# Patient Record
Sex: MALE | Race: BLACK OR AFRICAN AMERICAN | Employment: UNEMPLOYED | URBAN - METROPOLITAN AREA
[De-identification: names, ages, dates, MRNs, and addresses within clinical notes are randomized per-mention and may not be internally consistent; named-entity substitution may affect disease eponyms.]

---

## 2018-01-17 PROBLEM — L89.312 PRESSURE ULCER OF RIGHT BUTTOCK, STAGE 2 (HCC): Status: ACTIVE | Noted: 2018-01-17

## 2018-01-17 PROBLEM — Z87.898 HISTORY OF URINARY URGENCY: Status: ACTIVE | Noted: 2018-01-17

## 2018-06-14 ENCOUNTER — HOSPITAL ENCOUNTER (INPATIENT)
Age: 83
LOS: 1 days | Discharge: NURSING, REHAB OR LONG-TERM CARE | DRG: 683 | End: 2018-06-16
Attending: EMERGENCY MEDICINE | Admitting: INTERNAL MEDICINE
Payer: MEDICARE

## 2018-06-14 ENCOUNTER — APPOINTMENT (OUTPATIENT)
Dept: GENERAL RADIOLOGY | Age: 83
DRG: 683 | End: 2018-06-14
Payer: MEDICARE

## 2018-06-14 DIAGNOSIS — R53.1 GENERALIZED WEAKNESS: Primary | ICD-10-CM

## 2018-06-14 DIAGNOSIS — N28.9 ACUTE RENAL INSUFFICIENCY: ICD-10-CM

## 2018-06-14 DIAGNOSIS — R26.2 AMBULATORY DYSFUNCTION: ICD-10-CM

## 2018-06-14 LAB
ALBUMIN SERPL-MCNC: 3.6 G/DL (ref 3.5–5.2)
ALP BLD-CCNC: 64 U/L (ref 40–129)
ALT SERPL-CCNC: 20 U/L (ref 0–40)
ANION GAP SERPL CALCULATED.3IONS-SCNC: 12 MMOL/L (ref 7–16)
AST SERPL-CCNC: 40 U/L (ref 0–39)
BASOPHILS ABSOLUTE: 0.03 E9/L (ref 0–0.2)
BASOPHILS RELATIVE PERCENT: 0.6 % (ref 0–2)
BILIRUB SERPL-MCNC: 0.6 MG/DL (ref 0–1.2)
BUN BLDV-MCNC: 27 MG/DL (ref 8–23)
CALCIUM SERPL-MCNC: 9.1 MG/DL (ref 8.6–10.2)
CHLORIDE BLD-SCNC: 101 MMOL/L (ref 98–107)
CO2: 28 MMOL/L (ref 22–29)
CREAT SERPL-MCNC: 1.5 MG/DL (ref 0.7–1.2)
EKG ATRIAL RATE: 63 BPM
EKG P AXIS: 49 DEGREES
EKG P-R INTERVAL: 80 MS
EKG Q-T INTERVAL: 422 MS
EKG QRS DURATION: 106 MS
EKG QTC CALCULATION (BAZETT): 431 MS
EKG R AXIS: -58 DEGREES
EKG T AXIS: 39 DEGREES
EKG VENTRICULAR RATE: 63 BPM
EOSINOPHILS ABSOLUTE: 0.08 E9/L (ref 0.05–0.5)
EOSINOPHILS RELATIVE PERCENT: 1.5 % (ref 0–6)
GFR AFRICAN AMERICAN: 54
GFR NON-AFRICAN AMERICAN: 54 ML/MIN/1.73
GLUCOSE BLD-MCNC: 101 MG/DL (ref 74–109)
HCT VFR BLD CALC: 37.3 % (ref 37–54)
HEMOGLOBIN: 12.1 G/DL (ref 12.5–16.5)
IMMATURE GRANULOCYTES #: 0.03 E9/L
IMMATURE GRANULOCYTES %: 0.6 % (ref 0–5)
INR BLD: 1.2
LACTIC ACID: 1.1 MMOL/L (ref 0.5–2.2)
LYMPHOCYTES ABSOLUTE: 0.91 E9/L (ref 1.5–4)
LYMPHOCYTES RELATIVE PERCENT: 17.5 % (ref 20–42)
MCH RBC QN AUTO: 29.6 PG (ref 26–35)
MCHC RBC AUTO-ENTMCNC: 32.4 % (ref 32–34.5)
MCV RBC AUTO: 91.2 FL (ref 80–99.9)
MONOCYTES ABSOLUTE: 0.53 E9/L (ref 0.1–0.95)
MONOCYTES RELATIVE PERCENT: 10.2 % (ref 2–12)
NEUTROPHILS ABSOLUTE: 3.63 E9/L (ref 1.8–7.3)
NEUTROPHILS RELATIVE PERCENT: 69.6 % (ref 43–80)
PDW BLD-RTO: 14.9 FL (ref 11.5–15)
PLATELET # BLD: 146 E9/L (ref 130–450)
PMV BLD AUTO: 11 FL (ref 7–12)
POTASSIUM SERPL-SCNC: 3.7 MMOL/L (ref 3.5–5)
PROTHROMBIN TIME: 14.3 SEC (ref 9.3–12.4)
RBC # BLD: 4.09 E12/L (ref 3.8–5.8)
SODIUM BLD-SCNC: 141 MMOL/L (ref 132–146)
TOTAL PROTEIN: 6.7 G/DL (ref 6.4–8.3)
TROPONIN: 0.01 NG/ML (ref 0–0.03)
WBC # BLD: 5.2 E9/L (ref 4.5–11.5)

## 2018-06-14 PROCEDURE — 83605 ASSAY OF LACTIC ACID: CPT

## 2018-06-14 PROCEDURE — 71045 X-RAY EXAM CHEST 1 VIEW: CPT

## 2018-06-14 PROCEDURE — 84484 ASSAY OF TROPONIN QUANT: CPT

## 2018-06-14 PROCEDURE — 85025 COMPLETE CBC W/AUTO DIFF WBC: CPT

## 2018-06-14 PROCEDURE — 51702 INSERT TEMP BLADDER CATH: CPT

## 2018-06-14 PROCEDURE — 81001 URINALYSIS AUTO W/SCOPE: CPT

## 2018-06-14 PROCEDURE — 87088 URINE BACTERIA CULTURE: CPT

## 2018-06-14 PROCEDURE — 94761 N-INVAS EAR/PLS OXIMETRY MLT: CPT

## 2018-06-14 PROCEDURE — 85610 PROTHROMBIN TIME: CPT

## 2018-06-14 PROCEDURE — 80053 COMPREHEN METABOLIC PANEL: CPT

## 2018-06-14 PROCEDURE — 99285 EMERGENCY DEPT VISIT HI MDM: CPT

## 2018-06-14 PROCEDURE — 2580000003 HC RX 258: Performed by: EMERGENCY MEDICINE

## 2018-06-14 RX ORDER — 0.9 % SODIUM CHLORIDE 0.9 %
1000 INTRAVENOUS SOLUTION INTRAVENOUS ONCE
Status: COMPLETED | OUTPATIENT
Start: 2018-06-14 | End: 2018-06-15

## 2018-06-14 RX ADMIN — SODIUM CHLORIDE 1000 ML: 9 INJECTION, SOLUTION INTRAVENOUS at 23:48

## 2018-06-15 PROBLEM — R53.1 GENERAL WEAKNESS: Status: ACTIVE | Noted: 2018-06-15

## 2018-06-15 PROBLEM — N17.9 AKI (ACUTE KIDNEY INJURY) (HCC): Status: ACTIVE | Noted: 2018-06-15

## 2018-06-15 PROBLEM — E44.0 MODERATE PROTEIN-CALORIE MALNUTRITION (HCC): Status: ACTIVE | Noted: 2018-06-15

## 2018-06-15 LAB
BACTERIA: ABNORMAL /HPF
BILIRUBIN URINE: NEGATIVE
BLOOD, URINE: ABNORMAL
CASTS: ABNORMAL /LPF
CLARITY: CLEAR
COLOR: YELLOW
EPITHELIAL CELLS, UA: ABNORMAL /HPF
GLUCOSE URINE: NEGATIVE MG/DL
KETONES, URINE: ABNORMAL MG/DL
LEUKOCYTE ESTERASE, URINE: ABNORMAL
NITRITE, URINE: NEGATIVE
PH UA: 5 (ref 5–9)
PROTEIN UA: NEGATIVE MG/DL
RBC UA: ABNORMAL /HPF (ref 0–2)
SPECIFIC GRAVITY UA: 1.02 (ref 1–1.03)
UROBILINOGEN, URINE: 0.2 E.U./DL
WBC UA: ABNORMAL /HPF (ref 0–5)

## 2018-06-15 PROCEDURE — 6370000000 HC RX 637 (ALT 250 FOR IP): Performed by: INTERNAL MEDICINE

## 2018-06-15 PROCEDURE — 2580000003 HC RX 258: Performed by: INTERNAL MEDICINE

## 2018-06-15 PROCEDURE — G8987 SELF CARE CURRENT STATUS: HCPCS

## 2018-06-15 PROCEDURE — 97165 OT EVAL LOW COMPLEX 30 MIN: CPT

## 2018-06-15 PROCEDURE — G8988 SELF CARE GOAL STATUS: HCPCS

## 2018-06-15 PROCEDURE — 6360000002 HC RX W HCPCS: Performed by: INTERNAL MEDICINE

## 2018-06-15 PROCEDURE — 97161 PT EVAL LOW COMPLEX 20 MIN: CPT

## 2018-06-15 PROCEDURE — G0378 HOSPITAL OBSERVATION PER HR: HCPCS

## 2018-06-15 PROCEDURE — 1200000000 HC SEMI PRIVATE

## 2018-06-15 PROCEDURE — 96372 THER/PROPH/DIAG INJ SC/IM: CPT

## 2018-06-15 RX ORDER — MEMANTINE HYDROCHLORIDE 10 MG/1
10 TABLET ORAL 2 TIMES DAILY
Status: DISCONTINUED | OUTPATIENT
Start: 2018-06-15 | End: 2018-06-16 | Stop reason: HOSPADM

## 2018-06-15 RX ORDER — DONEPEZIL HYDROCHLORIDE 5 MG/1
10 TABLET, FILM COATED ORAL NIGHTLY
Status: DISCONTINUED | OUTPATIENT
Start: 2018-06-15 | End: 2018-06-16 | Stop reason: HOSPADM

## 2018-06-15 RX ORDER — SODIUM CHLORIDE 0.9 % (FLUSH) 0.9 %
10 SYRINGE (ML) INJECTION EVERY 12 HOURS SCHEDULED
Status: DISCONTINUED | OUTPATIENT
Start: 2018-06-15 | End: 2018-06-16 | Stop reason: HOSPADM

## 2018-06-15 RX ORDER — SODIUM CHLORIDE 0.9 % (FLUSH) 0.9 %
10 SYRINGE (ML) INJECTION PRN
Status: DISCONTINUED | OUTPATIENT
Start: 2018-06-15 | End: 2018-06-16 | Stop reason: HOSPADM

## 2018-06-15 RX ORDER — ONDANSETRON 2 MG/ML
4 INJECTION INTRAMUSCULAR; INTRAVENOUS EVERY 6 HOURS PRN
Status: DISCONTINUED | OUTPATIENT
Start: 2018-06-15 | End: 2018-06-16 | Stop reason: HOSPADM

## 2018-06-15 RX ORDER — ACETAMINOPHEN 325 MG/1
650 TABLET ORAL EVERY 4 HOURS PRN
Status: DISCONTINUED | OUTPATIENT
Start: 2018-06-15 | End: 2018-06-16 | Stop reason: HOSPADM

## 2018-06-15 RX ORDER — POTASSIUM CHLORIDE AND SODIUM CHLORIDE 450; 150 MG/100ML; MG/100ML
INJECTION, SOLUTION INTRAVENOUS CONTINUOUS
Status: DISCONTINUED | OUTPATIENT
Start: 2018-06-15 | End: 2018-06-16 | Stop reason: HOSPADM

## 2018-06-15 RX ORDER — ASPIRIN 81 MG/1
81 TABLET ORAL DAILY
Status: DISCONTINUED | OUTPATIENT
Start: 2018-06-15 | End: 2018-06-16 | Stop reason: HOSPADM

## 2018-06-15 RX ADMIN — PETROLATUM: 42 OINTMENT TOPICAL at 20:42

## 2018-06-15 RX ADMIN — PETROLATUM: 42 OINTMENT TOPICAL at 14:36

## 2018-06-15 RX ADMIN — ENOXAPARIN SODIUM 40 MG: 40 INJECTION, SOLUTION INTRAVENOUS; SUBCUTANEOUS at 08:40

## 2018-06-15 RX ADMIN — ASPIRIN 81 MG: 81 TABLET, COATED ORAL at 08:40

## 2018-06-15 RX ADMIN — Medication 10 ML: at 04:24

## 2018-06-15 RX ADMIN — Medication 10 ML: at 20:42

## 2018-06-15 RX ADMIN — SODIUM CHLORIDE AND POTASSIUM CHLORIDE: 4.5; 1.49 INJECTION, SOLUTION INTRAVENOUS at 04:24

## 2018-06-15 RX ADMIN — DONEPEZIL HYDROCHLORIDE 10 MG: 5 TABLET ORAL at 20:41

## 2018-06-15 RX ADMIN — SODIUM CHLORIDE AND POTASSIUM CHLORIDE: 4.5; 1.49 INJECTION, SOLUTION INTRAVENOUS at 17:36

## 2018-06-15 RX ADMIN — Medication 10 ML: at 13:36

## 2018-06-15 RX ADMIN — MEMANTINE HYDROCHLORIDE 10 MG: 10 TABLET, FILM COATED ORAL at 08:40

## 2018-06-15 RX ADMIN — MEMANTINE HYDROCHLORIDE 10 MG: 10 TABLET, FILM COATED ORAL at 20:41

## 2018-06-15 RX ADMIN — Medication 10 ML: at 17:36

## 2018-06-15 ASSESSMENT — PAIN SCALES - PAIN ASSESSMENT IN ADVANCED DEMENTIA (PAINAD)
TOTALSCORE: 0
BREATHING: 0
BODYLANGUAGE: 0
FACIALEXPRESSION: 0
NEGVOCALIZATION: 0
FACIALEXPRESSION: 0
CONSOLABILITY: 0
TOTALSCORE: 0
NEGVOCALIZATION: 0
TOTALSCORE: 0
BODYLANGUAGE: 0
CONSOLABILITY: 0
CONSOLABILITY: 0
FACIALEXPRESSION: 0
BREATHING: 0
BODYLANGUAGE: 0
BODYLANGUAGE: 0
TOTALSCORE: 0
BREATHING: 0
CONSOLABILITY: 0
NEGVOCALIZATION: 0
BREATHING: 0
NEGVOCALIZATION: 0
FACIALEXPRESSION: 0

## 2018-06-15 ASSESSMENT — PAIN SCALES - GENERAL
PAINLEVEL_OUTOF10: 0

## 2018-06-15 NOTE — PLAN OF CARE
Problem: Falls - Risk of:  Goal: Will remain free from falls  Will remain free from falls   Outcome: Met This Shift      Problem: Risk for Impaired Skin Integrity  Goal: Tissue integrity - skin and mucous membranes  Structural intactness and normal physiological function of skin and  mucous membranes.    Outcome: Met This Shift

## 2018-06-15 NOTE — PLAN OF CARE
Problem: Nutrition  Goal: Optimal nutrition therapy  Outcome: Ongoing  Nutrition Problem:  Moderate malnutrition, in context of acute illness or injury  Intervention: Food and/or Nutrient Delivery: Continue current diet, Start ONS (Lenard BID for wound healing, Ensure daily )  Nutritional Goals: Pt to consume >50% for all meals, 100% of ONS, promote wound healing   Follow-up Date: 6/19

## 2018-06-15 NOTE — ED PROVIDER NOTES
mmol/L    Potassium 3.7 3.5 - 5.0 mmol/L    Chloride 101 98 - 107 mmol/L    CO2 28 22 - 29 mmol/L    Anion Gap 12 7 - 16 mmol/L    Glucose 101 74 - 109 mg/dL    BUN 27 (H) 8 - 23 mg/dL    CREATININE 1.5 (H) 0.7 - 1.2 mg/dL    GFR Non-African American 54 >=60 mL/min/1.73    GFR African American 54     Calcium 9.1 8.6 - 10.2 mg/dL    Total Protein 6.7 6.4 - 8.3 g/dL    Alb 3.6 3.5 - 5.2 g/dL    Total Bilirubin 0.6 0.0 - 1.2 mg/dL    Alkaline Phosphatase 64 40 - 129 U/L    ALT 20 0 - 40 U/L    AST 40 (H) 0 - 39 U/L   CBC Auto Differential   Result Value Ref Range    WBC 5.2 4.5 - 11.5 E9/L    RBC 4.09 3.80 - 5.80 E12/L    Hemoglobin 12.1 (L) 12.5 - 16.5 g/dL    Hematocrit 37.3 37.0 - 54.0 %    MCV 91.2 80.0 - 99.9 fL    MCH 29.6 26.0 - 35.0 pg    MCHC 32.4 32.0 - 34.5 %    RDW 14.9 11.5 - 15.0 fL    Platelets 866 007 - 216 E9/L    MPV 11.0 7.0 - 12.0 fL    Neutrophils % 69.6 43.0 - 80.0 %    Immature Granulocytes % 0.6 0.0 - 5.0 %    Lymphocytes % 17.5 (L) 20.0 - 42.0 %    Monocytes % 10.2 2.0 - 12.0 %    Eosinophils % 1.5 0.0 - 6.0 %    Basophils % 0.6 0.0 - 2.0 %    Neutrophils # 3.63 1.80 - 7.30 E9/L    Immature Granulocytes # 0.03 E9/L    Lymphocytes # 0.91 (L) 1.50 - 4.00 E9/L    Monocytes # 0.53 0.10 - 0.95 E9/L    Eosinophils # 0.08 0.05 - 0.50 E9/L    Basophils # 0.03 0.00 - 0.20 E9/L   Troponin   Result Value Ref Range    Troponin 0.01 0.00 - 0.03 ng/mL   Urinalysis   Result Value Ref Range    Color, UA Yellow Straw/Yellow    Clarity, UA Clear Clear    Glucose, Ur Negative Negative mg/dL    Bilirubin Urine Negative Negative    Ketones, Urine TRACE (A) Negative mg/dL    Specific Gravity, UA 1.025 1.005 - 1.030    Blood, Urine MODERATE (A) Negative    pH, UA 5.0 5.0 - 9.0    Protein, UA Negative Negative mg/dL    Urobilinogen, Urine 0.2 <2.0 E.U./dL    Nitrite, Urine Negative Negative    Leukocyte Esterase, Urine TRACE (A) Negative   Lactic Acid, Plasma   Result Value Ref Range    Lactic Acid 1.1 0.5 - 2.2 mmol/L   Protime-INR   Result Value Ref Range    Protime 14.3 (H) 9.3 - 12.4 sec    INR 1.2    Microscopic Urinalysis   Result Value Ref Range    Casts RARE /LPF    WBC, UA 2-5 0 - 5 /HPF    RBC, UA 10-20 (A) 0 - 2 /HPF    Epi Cells FEW /HPF    Bacteria, UA RARE (A) /HPF   EKG 12 Lead   Result Value Ref Range    Ventricular Rate 63 BPM    Atrial Rate 63 BPM    P-R Interval 80 ms    QRS Duration 106 ms    Q-T Interval 422 ms    QTc Calculation (Bazett) 431 ms    P Axis 49 degrees    R Axis -58 degrees    T Axis 39 degrees       RADIOLOGY:  Interpreted by Radiologist.  XR CHEST PORTABLE   Final Result      No airspace opacities or pleural effusion. EKG:  This EKG is signed and interpreted by the EP. Time: 21:36  Rate: 63  Rhythm: sinus rhythm with short AK with premature atrial complexes. Interpretation: left anterior fascicular block, septal infarct, age undetermined. Comparison: no previous EKG available'    ------------------------- NURSING NOTES AND VITALS REVIEWED ---------------------------   The nursing notes within the ED encounter and vital signs as below have been reviewed. BP (!) 145/69   Pulse 84   Temp 98 °F (36.7 °C) (Oral)   Resp 14   Ht 6' 5\" (1.956 m)   Wt 275 lb (124.7 kg)   SpO2 96%   BMI 32.61 kg/m²   Oxygen Saturation Interpretation: Normal    ---------------------------------------------------PHYSICAL EXAM--------------------------------------    Constitutional/General: Alert and oriented x2, appears dehydrated, non toxic in NAD  Head: NC/AT  Eyes: PERRL, EOMI, dry mucous membranes  Mouth: Oropharynx clear, handling secretions, no trismus  Neck: Supple, full ROM,   Pulmonary: Lungs clear to auscultation bilaterally, no wheezes, rales, or rhonchi. Not in respiratory distress  Cardiovascular:  Regular rate and rhythm, no murmurs, gallops, or rubs. 2+ distal pulses  Abdomen: Soft, non tender, non distended,   Extremities: Moves all extremities x 4.  Warm and well

## 2018-06-15 NOTE — PROGRESS NOTES
OCCUPATIONAL THERAPY INITIAL EVALUATION        Date:6/15/2018  Patient Name: Richie Caruso  MRN: [id number]  : 1932  Room: [address] [place] [address]     Evaluating OT: Socorro Ramey OTR/L #[id number]     Recommended Adaptive Equipment: TBD  AM-PAC Daily Activity Raw Score:   G-Code: CK    Diagnosis: DINA (acute kidney injury) ([facility] [address] [N17.9]  [facility] [N17.9]      Past Medical History:   Past Medical History:   Diagnosis Date    History of pulmonary embolism     WITH RIGHT VENTRICULAR INFARCTION    Macular degeneration     Metabolic syndrome       Precautions: Fall risk, Confusion     Home Living: Pt lives w/ spouse in 1 floor home. Bathroom setup: Tub w/ shower seat   Equipment owned: walker    Prior Level of Function: Per pt, assist with ADLs; ambulated independently. Pt appears to be a poor historian. Family/caregivers not present. Pain Level: No c/o pain this session    Cognition: oriented x 2   follows 1 step directions. poor Problem solving skills  poor Memory   poor Sequencing   Poor safety  Additional Comments: Increased confusion noted. Cues provided for safety and sequencing basic functional tasks. Sensory:   Hearing: WFL  Vision: h/o macular degeneration    Glasses: yes [x] no [] reading []      UE Assessment:  Hand Dominance: Right [x]  Left []     Strength ROM Additional Info:    RUE   Distal: 4-/5 Shoulder flexion limited to ~100 degrees  Distal: WFL Fair  and Poor FMC/dexterity noted during ADL tasks     LUE Distal: 4-/5 Shoulder flexion limited to ~110 degrees  Distal: WFL Fair  and Poor FMC/dexterity noted during ADL tasks   Sensation: Intact      Functional Assessment:   Initial Status  Comments   Feeding  S    Grooming  Min A    Upper Body Dressing Mod A     Lower Body Dressing Max A    Bathing Max A    Toileting  Max A Bowel management   Bed Mobility  Supine to Sit: Mod A      Functional Transfers Mod A x2  w/ ww.  Cueing for safety, body mechanics and sequencing. Functional Mobility Mod A x2 w/ ww     Sit balance: SBA  Stand balance: Mod A  Endurance/Activity tolerance: Fair                              Comments: Upon arrival pt laying supine in bed w/ HOB elevated. At end of session pt seated in chair with all devices within reach, all lines and tubes intact. Safety alarm on. Assessment of current deficits   Functional mobility [x]  ROM [] Strength [x]  Cognition [x]  ADLs [x]   IADLs [] Safety Awareness [x] Endurance [x]  Fine Motor Coordination [x] Balance [x] Vision/perception [] Sensation []   Gross Motor Coordination []     Eval Complexity: Low    Treatment frequency: 3-5x/wk    Plan of Care:  ADL retraining [x]   Equipment needs [x]   Neuromuscular re-education [x] Energy Conservation Techniques [x]  Functional Transfer training [x] Patient and/or Family Education [x]  Functional Mobility training [x]  Environmental Modifications []  Cognitive re-training [x]   Compensatory techniques for ADLs [x]  Splinting Needs []   Positioning to improve overall function []  Other: []      Rehab Potential: Fair/Good      Short term goals  STG 1 :  Pt will complete basic grooming task with CGA while sitting  STG 2 : Pt will complete UB self-care tasks with Min A  STG 3:   Pt will complete LB self-care tasks w/ Mod A  STG 4 :  Pt will complete functional transfers with Mod A x1  STG 5 :  Pt will demonstrate Good activity tolerance while completing ADL task. Pt participated in establishment of the following goals       Time Frame: 5 days  OT goals were explained to patient     Patient and/or family understands diagnosis, prognosis and plan of care: yes  Pt educated on OT POC. Patient  verbalized Fair understanding of education, requires additional education       [] Malnutrition indicators have been identified and nursing has been notified to ensure a dietitian consult is ordered.           Evaluation completed  Time in: 08:47  Time out: 09:00      Jennifer [place] #[id number]

## 2018-06-15 NOTE — PROGRESS NOTES
Wound care consult called, message left on answering machine for wound evaluation and treatment to patient's coccyx and right buttocks areas

## 2018-06-15 NOTE — PROGRESS NOTES
Nutrition Assessment    Type and Reason for Visit: Initial, Consult    Nutrition Recommendations: Continue current diet, Start ONS (Lenard BID for wound healing, Ensure daily )    Malnutrition Assessment:  · Malnutrition Status: Meets the criteria for moderate malnutrition  · Context: Acute illness or injury  · Findings of the 6 clinical characteristics of malnutrition (Minimum of 2 out of 6 clinical characteristics is required to make the diagnosis of moderate or severe Protein Calorie Malnutrition based on AND/ASPEN Guidelines):  1. Energy Intake-Not available (pt poor historian), not able to assess    2. Weight Loss-Unable to assess (d/t no measured wt),    3. Fat Loss-No significant subcutaneous fat loss,    4. Muscle Loss-Mild muscle mass loss, Clavicles (pectoralis and deltoids)  5. Fluid Accumulation-Mild fluid accumulation (2+ pitting edema),    6.  Strength- Not Measured     Nutrition Diagnosis:   · Problem:  Moderate malnutrition, in context of acute illness or injury  · Etiology: related to Cognitive or neurological impairment     Signs and symptoms:  as evidenced by Localized or generalized fluid accumulation, Presence of wounds (mild muscle loss)    Nutrition Assessment:  · Subjective Assessment: c/s for Stage II PU   · Nutrition-Focused Physical Findings: oriented to person, dentures U/L, GI WDL, 2+ pitting edema   · Wound Type: Stage II, Pressure Ulcer  · Current Nutrition Therapies:  · Oral Diet Orders: Cardiac   · Oral Diet intake: Unable to assess (nothing documented )  · Anthropometric Measures:  · Ht: 6' 5\" (195.6 cm)   · Current Body Wt: 204 lb (92.5 kg) (6/15 bedscale)  · Admission Body Wt: 275 lb (124.7 kg) (stated)  · Usual Body Wt: 228 lb (103.4 kg) (per EMR 4/2018, no method)  · % Weight Change: 11% wt loss x 4 months potentially, AZAEL d/t no measured wt ,     · Ideal Body Wt: 208 lb (94.3 kg), % Ideal Body 98%   · BMI Classification: BMI 18.5 - 24.9 Normal Weight  · Comparative

## 2018-06-16 VITALS
DIASTOLIC BLOOD PRESSURE: 72 MMHG | BODY MASS INDEX: 25.09 KG/M2 | OXYGEN SATURATION: 98 % | HEART RATE: 60 BPM | SYSTOLIC BLOOD PRESSURE: 140 MMHG | RESPIRATION RATE: 16 BRPM | HEIGHT: 77 IN | TEMPERATURE: 98 F | WEIGHT: 212.5 LBS

## 2018-06-16 LAB
ALBUMIN SERPL-MCNC: 3.2 G/DL (ref 3.5–5.2)
ALP BLD-CCNC: 57 U/L (ref 40–129)
ALT SERPL-CCNC: 19 U/L (ref 0–40)
ANION GAP SERPL CALCULATED.3IONS-SCNC: 10 MMOL/L (ref 7–16)
AST SERPL-CCNC: 31 U/L (ref 0–39)
BASOPHILS ABSOLUTE: 0.03 E9/L (ref 0–0.2)
BASOPHILS RELATIVE PERCENT: 0.7 % (ref 0–2)
BILIRUB SERPL-MCNC: 0.7 MG/DL (ref 0–1.2)
BUN BLDV-MCNC: 27 MG/DL (ref 8–23)
CALCIUM SERPL-MCNC: 8.7 MG/DL (ref 8.6–10.2)
CHLORIDE BLD-SCNC: 105 MMOL/L (ref 98–107)
CO2: 25 MMOL/L (ref 22–29)
CREAT SERPL-MCNC: 1 MG/DL (ref 0.7–1.2)
EOSINOPHILS ABSOLUTE: 0.14 E9/L (ref 0.05–0.5)
EOSINOPHILS RELATIVE PERCENT: 3.1 % (ref 0–6)
GFR AFRICAN AMERICAN: >60
GFR NON-AFRICAN AMERICAN: >60 ML/MIN/1.73
GLUCOSE BLD-MCNC: 91 MG/DL (ref 74–109)
HCT VFR BLD CALC: 35.5 % (ref 37–54)
HEMOGLOBIN: 11.5 G/DL (ref 12.5–16.5)
IMMATURE GRANULOCYTES #: 0.02 E9/L
IMMATURE GRANULOCYTES %: 0.4 % (ref 0–5)
LYMPHOCYTES ABSOLUTE: 1.13 E9/L (ref 1.5–4)
LYMPHOCYTES RELATIVE PERCENT: 25.3 % (ref 20–42)
MAGNESIUM: 1.9 MG/DL (ref 1.6–2.6)
MCH RBC QN AUTO: 29.6 PG (ref 26–35)
MCHC RBC AUTO-ENTMCNC: 32.4 % (ref 32–34.5)
MCV RBC AUTO: 91.5 FL (ref 80–99.9)
MONOCYTES ABSOLUTE: 0.43 E9/L (ref 0.1–0.95)
MONOCYTES RELATIVE PERCENT: 9.6 % (ref 2–12)
NEUTROPHILS ABSOLUTE: 2.72 E9/L (ref 1.8–7.3)
NEUTROPHILS RELATIVE PERCENT: 60.9 % (ref 43–80)
PDW BLD-RTO: 14.7 FL (ref 11.5–15)
PLATELET # BLD: 140 E9/L (ref 130–450)
PMV BLD AUTO: 11.2 FL (ref 7–12)
POTASSIUM REFLEX MAGNESIUM: 3.5 MMOL/L (ref 3.5–5)
POTASSIUM SERPL-SCNC: 3.5 MMOL/L (ref 3.5–5)
RBC # BLD: 3.88 E12/L (ref 3.8–5.8)
SODIUM BLD-SCNC: 140 MMOL/L (ref 132–146)
TOTAL PROTEIN: 6 G/DL (ref 6.4–8.3)
WBC # BLD: 4.5 E9/L (ref 4.5–11.5)

## 2018-06-16 PROCEDURE — G0378 HOSPITAL OBSERVATION PER HR: HCPCS

## 2018-06-16 PROCEDURE — 80048 BASIC METABOLIC PNL TOTAL CA: CPT

## 2018-06-16 PROCEDURE — 80053 COMPREHEN METABOLIC PANEL: CPT

## 2018-06-16 PROCEDURE — 83735 ASSAY OF MAGNESIUM: CPT

## 2018-06-16 PROCEDURE — 96372 THER/PROPH/DIAG INJ SC/IM: CPT

## 2018-06-16 PROCEDURE — 36415 COLL VENOUS BLD VENIPUNCTURE: CPT

## 2018-06-16 PROCEDURE — 2580000003 HC RX 258: Performed by: INTERNAL MEDICINE

## 2018-06-16 PROCEDURE — 6370000000 HC RX 637 (ALT 250 FOR IP): Performed by: INTERNAL MEDICINE

## 2018-06-16 PROCEDURE — 85025 COMPLETE CBC W/AUTO DIFF WBC: CPT

## 2018-06-16 PROCEDURE — 6360000002 HC RX W HCPCS: Performed by: INTERNAL MEDICINE

## 2018-06-16 RX ADMIN — PETROLATUM: 42 OINTMENT TOPICAL at 09:49

## 2018-06-16 RX ADMIN — ENOXAPARIN SODIUM 40 MG: 40 INJECTION, SOLUTION INTRAVENOUS; SUBCUTANEOUS at 09:49

## 2018-06-16 RX ADMIN — Medication 10 ML: at 10:21

## 2018-06-16 RX ADMIN — ASPIRIN 81 MG: 81 TABLET, COATED ORAL at 09:49

## 2018-06-16 RX ADMIN — MEMANTINE HYDROCHLORIDE 10 MG: 10 TABLET, FILM COATED ORAL at 09:49

## 2018-06-16 ASSESSMENT — PAIN SCALES - PAIN ASSESSMENT IN ADVANCED DEMENTIA (PAINAD)
NEGVOCALIZATION: 0
NEGVOCALIZATION: 0
CONSOLABILITY: 0
BODYLANGUAGE: 0
TOTALSCORE: 0
FACIALEXPRESSION: 0
CONSOLABILITY: 0
TOTALSCORE: 0
BREATHING: 0
FACIALEXPRESSION: 0
BREATHING: 0
BODYLANGUAGE: 0

## 2018-06-16 NOTE — PROGRESS NOTES
Patient wife Vahe Gloria updated that patient will be for  about 5:00 PM to go to [place].   Electronically signed by Laly Londono RN on 6/16/2018 at 4:10 PM

## 2018-06-16 NOTE — PROGRESS NOTES
[employer] fleet set up for 1700 with ambulette  - SOV Kristan notified.   Electronically signed by Marilu Everett RN on 6/16/2018 at 3:02 PM

## 2018-06-17 LAB — URINE CULTURE, ROUTINE: NORMAL

## 2018-08-15 PROBLEM — I21.4 NSTEMI (NON-ST ELEVATED MYOCARDIAL INFARCTION) (HCC): Status: ACTIVE | Noted: 2018-08-15

## 2018-09-01 ENCOUNTER — APPOINTMENT (OUTPATIENT)
Dept: CT IMAGING | Age: 83
DRG: 699 | End: 2018-09-01
Payer: MEDICARE

## 2018-09-01 ENCOUNTER — APPOINTMENT (OUTPATIENT)
Dept: GENERAL RADIOLOGY | Age: 83
DRG: 699 | End: 2018-09-01
Payer: MEDICARE

## 2018-09-01 ENCOUNTER — HOSPITAL ENCOUNTER (INPATIENT)
Age: 83
LOS: 3 days | Discharge: NURSING, REHAB OR LONG-TERM CARE | DRG: 699 | End: 2018-09-05
Attending: EMERGENCY MEDICINE | Admitting: FAMILY MEDICINE
Payer: MEDICARE

## 2018-09-01 DIAGNOSIS — R60.9 PERIPHERAL EDEMA: ICD-10-CM

## 2018-09-01 DIAGNOSIS — N30.00 ACUTE CYSTITIS WITHOUT HEMATURIA: Primary | ICD-10-CM

## 2018-09-01 PROBLEM — G30.9 ALZHEIMER'S DEMENTIA WITH BEHAVIORAL DISTURBANCE (HCC): Status: ACTIVE | Noted: 2018-09-01

## 2018-09-01 PROBLEM — F02.818 ALZHEIMER'S DEMENTIA WITH BEHAVIORAL DISTURBANCE (HCC): Status: ACTIVE | Noted: 2018-09-01

## 2018-09-01 LAB
ALBUMIN SERPL-MCNC: 3.8 G/DL (ref 3.5–5.2)
ALP BLD-CCNC: 64 U/L (ref 40–129)
ALT SERPL-CCNC: 15 U/L (ref 0–40)
ANION GAP SERPL CALCULATED.3IONS-SCNC: 13 MMOL/L (ref 7–16)
APTT: 32.8 SEC (ref 24.5–35.1)
AST SERPL-CCNC: 26 U/L (ref 0–39)
BACTERIA: ABNORMAL /HPF
BASOPHILS ABSOLUTE: 0.16 E9/L (ref 0–0.2)
BASOPHILS RELATIVE PERCENT: 2.6 % (ref 0–2)
BILIRUB SERPL-MCNC: 0.9 MG/DL (ref 0–1.2)
BILIRUBIN URINE: NEGATIVE
BLOOD, URINE: ABNORMAL
BUN BLDV-MCNC: 28 MG/DL (ref 8–23)
BURR CELLS: ABNORMAL
CALCIUM SERPL-MCNC: 9.4 MG/DL (ref 8.6–10.2)
CHLORIDE BLD-SCNC: 103 MMOL/L (ref 98–107)
CLARITY: CLEAR
CO2: 26 MMOL/L (ref 22–29)
COLOR: YELLOW
CREAT SERPL-MCNC: 1.4 MG/DL (ref 0.7–1.2)
EKG ATRIAL RATE: 56 BPM
EKG Q-T INTERVAL: 440 MS
EKG QRS DURATION: 122 MS
EKG QTC CALCULATION (BAZETT): 424 MS
EKG R AXIS: -46 DEGREES
EKG T AXIS: 10 DEGREES
EKG VENTRICULAR RATE: 56 BPM
EOSINOPHILS ABSOLUTE: 0 E9/L (ref 0.05–0.5)
EOSINOPHILS RELATIVE PERCENT: 0.3 % (ref 0–6)
GFR AFRICAN AMERICAN: 58
GFR AFRICAN AMERICAN: 58
GFR NON-AFRICAN AMERICAN: 48 ML/MIN/1.73
GFR NON-AFRICAN AMERICAN: 58 ML/MIN/1.73
GLUCOSE BLD-MCNC: 92 MG/DL (ref 74–109)
GLUCOSE BLD-MCNC: 93 MG/DL (ref 74–109)
GLUCOSE URINE: NEGATIVE MG/DL
HCT VFR BLD CALC: 34.9 % (ref 37–54)
HEMOGLOBIN: 11.4 G/DL (ref 12.5–16.5)
INR BLD: 1.2
KETONES, URINE: 15 MG/DL
LEUKOCYTE ESTERASE, URINE: ABNORMAL
LYMPHOCYTES ABSOLUTE: 0.31 E9/L (ref 1.5–4)
LYMPHOCYTES RELATIVE PERCENT: 5.2 % (ref 20–42)
MAGNESIUM: 2.1 MG/DL (ref 1.6–2.6)
MCH RBC QN AUTO: 29.8 PG (ref 26–35)
MCHC RBC AUTO-ENTMCNC: 32.7 % (ref 32–34.5)
MCV RBC AUTO: 91.1 FL (ref 80–99.9)
MONOCYTES ABSOLUTE: 0.31 E9/L (ref 0.1–0.95)
MONOCYTES RELATIVE PERCENT: 5.2 % (ref 2–12)
NEUTROPHILS ABSOLUTE: 5.39 E9/L (ref 1.8–7.3)
NEUTROPHILS RELATIVE PERCENT: 87 % (ref 43–80)
NITRITE, URINE: POSITIVE
OVALOCYTES: ABNORMAL
PDW BLD-RTO: 14.4 FL (ref 11.5–15)
PH UA: 6.5 (ref 5–9)
PLATELET # BLD: 140 E9/L (ref 130–450)
PMV BLD AUTO: 11 FL (ref 7–12)
POC CHLORIDE: 114 MMOL/L (ref 100–108)
POC CREATININE: 1.4 MG/DL (ref 0.7–1.2)
POC POTASSIUM: >12 MMOL/L (ref 3.5–5)
POC SODIUM: 133 MMOL/L (ref 132–146)
POIKILOCYTES: ABNORMAL
POTASSIUM SERPL-SCNC: 4.4 MMOL/L (ref 3.5–5)
PRO-BNP: 564 PG/ML (ref 0–450)
PROTEIN UA: NEGATIVE MG/DL
PROTHROMBIN TIME: 14 SEC (ref 9.3–12.4)
RBC # BLD: 3.83 E12/L (ref 3.8–5.8)
RBC UA: ABNORMAL /HPF (ref 0–2)
SODIUM BLD-SCNC: 142 MMOL/L (ref 132–146)
SPECIFIC GRAVITY UA: 1.01 (ref 1–1.03)
TOTAL PROTEIN: 7.3 G/DL (ref 6.4–8.3)
TROPONIN: <0.01 NG/ML (ref 0–0.03)
UROBILINOGEN, URINE: 0.2 E.U./DL
WBC # BLD: 6.2 E9/L (ref 4.5–11.5)
WBC UA: ABNORMAL /HPF (ref 0–5)

## 2018-09-01 PROCEDURE — 6370000000 HC RX 637 (ALT 250 FOR IP): Performed by: FAMILY MEDICINE

## 2018-09-01 PROCEDURE — G0378 HOSPITAL OBSERVATION PER HR: HCPCS

## 2018-09-01 PROCEDURE — 85730 THROMBOPLASTIN TIME PARTIAL: CPT

## 2018-09-01 PROCEDURE — 80053 COMPREHEN METABOLIC PANEL: CPT

## 2018-09-01 PROCEDURE — 84132 ASSAY OF SERUM POTASSIUM: CPT

## 2018-09-01 PROCEDURE — 51798 US URINE CAPACITY MEASURE: CPT

## 2018-09-01 PROCEDURE — 83735 ASSAY OF MAGNESIUM: CPT

## 2018-09-01 PROCEDURE — 74177 CT ABD & PELVIS W/CONTRAST: CPT

## 2018-09-01 PROCEDURE — 96372 THER/PROPH/DIAG INJ SC/IM: CPT

## 2018-09-01 PROCEDURE — 6370000000 HC RX 637 (ALT 250 FOR IP): Performed by: EMERGENCY MEDICINE

## 2018-09-01 PROCEDURE — 93005 ELECTROCARDIOGRAM TRACING: CPT | Performed by: EMERGENCY MEDICINE

## 2018-09-01 PROCEDURE — 2580000003 HC RX 258: Performed by: EMERGENCY MEDICINE

## 2018-09-01 PROCEDURE — 84484 ASSAY OF TROPONIN QUANT: CPT

## 2018-09-01 PROCEDURE — 82435 ASSAY OF BLOOD CHLORIDE: CPT

## 2018-09-01 PROCEDURE — 87088 URINE BACTERIA CULTURE: CPT

## 2018-09-01 PROCEDURE — 87186 SC STD MICRODIL/AGAR DIL: CPT

## 2018-09-01 PROCEDURE — 83880 ASSAY OF NATRIURETIC PEPTIDE: CPT

## 2018-09-01 PROCEDURE — 84295 ASSAY OF SERUM SODIUM: CPT

## 2018-09-01 PROCEDURE — 6370000000 HC RX 637 (ALT 250 FOR IP): Performed by: UROLOGY

## 2018-09-01 PROCEDURE — 96365 THER/PROPH/DIAG IV INF INIT: CPT

## 2018-09-01 PROCEDURE — 99285 EMERGENCY DEPT VISIT HI MDM: CPT

## 2018-09-01 PROCEDURE — 85025 COMPLETE CBC W/AUTO DIFF WBC: CPT

## 2018-09-01 PROCEDURE — 6360000002 HC RX W HCPCS: Performed by: EMERGENCY MEDICINE

## 2018-09-01 PROCEDURE — 81001 URINALYSIS AUTO W/SCOPE: CPT

## 2018-09-01 PROCEDURE — 70450 CT HEAD/BRAIN W/O DYE: CPT

## 2018-09-01 PROCEDURE — 82565 ASSAY OF CREATININE: CPT

## 2018-09-01 PROCEDURE — 71045 X-RAY EXAM CHEST 1 VIEW: CPT

## 2018-09-01 PROCEDURE — 82947 ASSAY GLUCOSE BLOOD QUANT: CPT

## 2018-09-01 PROCEDURE — 6360000004 HC RX CONTRAST MEDICATION: Performed by: RADIOLOGY

## 2018-09-01 PROCEDURE — 36415 COLL VENOUS BLD VENIPUNCTURE: CPT

## 2018-09-01 PROCEDURE — 96375 TX/PRO/DX INJ NEW DRUG ADDON: CPT

## 2018-09-01 PROCEDURE — 85610 PROTHROMBIN TIME: CPT

## 2018-09-01 RX ORDER — HYDROCODONE BITARTRATE AND ACETAMINOPHEN 5; 325 MG/1; MG/1
1 TABLET ORAL ONCE
Status: COMPLETED | OUTPATIENT
Start: 2018-09-01 | End: 2018-09-01

## 2018-09-01 RX ORDER — CEPHALEXIN 500 MG/1
500 CAPSULE ORAL 2 TIMES DAILY
Qty: 14 CAPSULE | Refills: 0 | Status: SHIPPED | OUTPATIENT
Start: 2018-09-01 | End: 2018-09-08

## 2018-09-01 RX ORDER — ACETAMINOPHEN 500 MG
500 TABLET ORAL EVERY 4 HOURS PRN
Status: DISCONTINUED | OUTPATIENT
Start: 2018-09-01 | End: 2018-09-05 | Stop reason: HOSPADM

## 2018-09-01 RX ORDER — CIPROFLOXACIN 500 MG/1
250 TABLET, FILM COATED ORAL EVERY 12 HOURS SCHEDULED
Status: DISCONTINUED | OUTPATIENT
Start: 2018-09-01 | End: 2018-09-05 | Stop reason: HOSPADM

## 2018-09-01 RX ORDER — ASPIRIN 81 MG/1
81 TABLET ORAL DAILY
Status: DISCONTINUED | OUTPATIENT
Start: 2018-09-01 | End: 2018-09-05 | Stop reason: HOSPADM

## 2018-09-01 RX ORDER — FUROSEMIDE 20 MG/1
20 TABLET ORAL DAILY
Status: DISCONTINUED | OUTPATIENT
Start: 2018-09-01 | End: 2018-09-05 | Stop reason: HOSPADM

## 2018-09-01 RX ORDER — DONEPEZIL HYDROCHLORIDE 5 MG/1
10 TABLET, FILM COATED ORAL NIGHTLY
Status: DISCONTINUED | OUTPATIENT
Start: 2018-09-01 | End: 2018-09-05 | Stop reason: HOSPADM

## 2018-09-01 RX ORDER — POTASSIUM CHLORIDE 20 MEQ/1
20 TABLET, EXTENDED RELEASE ORAL DAILY
Status: DISCONTINUED | OUTPATIENT
Start: 2018-09-01 | End: 2018-09-05 | Stop reason: HOSPADM

## 2018-09-01 RX ORDER — SODIUM CHLORIDE 0.9 % (FLUSH) 0.9 %
10 SYRINGE (ML) INJECTION
Status: DISPENSED | OUTPATIENT
Start: 2018-09-01 | End: 2018-09-01

## 2018-09-01 RX ORDER — FUROSEMIDE 10 MG/ML
40 INJECTION INTRAMUSCULAR; INTRAVENOUS ONCE
Status: COMPLETED | OUTPATIENT
Start: 2018-09-01 | End: 2018-09-01

## 2018-09-01 RX ORDER — MEMANTINE HYDROCHLORIDE 10 MG/1
10 TABLET ORAL 2 TIMES DAILY
Status: DISCONTINUED | OUTPATIENT
Start: 2018-09-01 | End: 2018-09-05 | Stop reason: HOSPADM

## 2018-09-01 RX ADMIN — MEMANTINE HYDROCHLORIDE 10 MG: 10 TABLET, FILM COATED ORAL at 21:08

## 2018-09-01 RX ADMIN — FUROSEMIDE 40 MG: 10 INJECTION, SOLUTION INTRAMUSCULAR; INTRAVENOUS at 11:00

## 2018-09-01 RX ADMIN — POTASSIUM CHLORIDE 20 MEQ: 20 TABLET, EXTENDED RELEASE ORAL at 18:51

## 2018-09-01 RX ADMIN — HYDROCODONE BITARTRATE AND ACETAMINOPHEN 1 TABLET: 5; 325 TABLET ORAL at 10:59

## 2018-09-01 RX ADMIN — CIPROFLOXACIN 250 MG: 500 TABLET, FILM COATED ORAL at 21:08

## 2018-09-01 RX ADMIN — CEFTRIAXONE SODIUM 1 G: 1 INJECTION, POWDER, FOR SOLUTION INTRAMUSCULAR; INTRAVENOUS at 11:53

## 2018-09-01 RX ADMIN — ASPIRIN 81 MG: 81 TABLET ORAL at 18:51

## 2018-09-01 RX ADMIN — FUROSEMIDE 20 MG: 20 TABLET ORAL at 18:51

## 2018-09-01 RX ADMIN — DONEPEZIL HYDROCHLORIDE 10 MG: 5 TABLET, FILM COATED ORAL at 21:08

## 2018-09-01 RX ADMIN — IOPAMIDOL 110 ML: 755 INJECTION, SOLUTION INTRAVENOUS at 09:49

## 2018-09-01 ASSESSMENT — PAIN DESCRIPTION - PROGRESSION: CLINICAL_PROGRESSION: GRADUALLY WORSENING

## 2018-09-01 ASSESSMENT — PAIN SCALES - GENERAL
PAINLEVEL_OUTOF10: 9
PAINLEVEL_OUTOF10: 5

## 2018-09-01 ASSESSMENT — PAIN DESCRIPTION - PAIN TYPE
TYPE: ACUTE PAIN
TYPE: ACUTE PAIN

## 2018-09-01 ASSESSMENT — PAIN DESCRIPTION - ORIENTATION
ORIENTATION: RIGHT;LEFT
ORIENTATION: LEFT

## 2018-09-01 ASSESSMENT — PAIN DESCRIPTION - LOCATION
LOCATION: LEG
LOCATION: LEG

## 2018-09-01 ASSESSMENT — PAIN DESCRIPTION - ONSET
ONSET: ON-GOING
ONSET: SUDDEN

## 2018-09-01 ASSESSMENT — PAIN DESCRIPTION - DESCRIPTORS
DESCRIPTORS: ACHING;CONSTANT;DISCOMFORT
DESCRIPTORS: ACHING

## 2018-09-01 NOTE — ED NOTES
Dr Skyler Suazo notified of critical K level. Informed Dr Tay Washington likely hemolyzed, but lab spec was also sent per order.   Verbal order to cancel EPOC     Lenin Lopez RN  09/01/18 [id number]

## 2018-09-01 NOTE — ED NOTES
Bed: 17A-17  Expected date:   Expected time:   Means of arrival:   Comments:  ems     Neyda Mccloud RN  09/01/18 0535

## 2018-09-01 NOTE — ED PROVIDER NOTES
Department of Emergency Medicine   ED  Provider Note  Admit Date/RoomTime: 9/1/2018  7:17 AM  ED Room: 17A/17A-17          History of Present Illness:  9/1/18, Time: 7:33 AM         Rashmi Klein is a 80 y.o. male presenting to the ED for peripheral edema, frequent fall and fluid retention, beginning several days ago. The complaint has been persistent, moderate in severity, and worsened by nothing. Family reports worsening peripheral edema as well as frequent falls and unsteady gait, worsening for the last week. He denies hitting head. He denies traumatic injuries from fall. Says his legs are wobbly and he cannot walk without falling. He lives at home with his wife. He was at [facility] recently. Review of Systems:   Pertinent positives and negatives are stated within HPI, all other systems reviewed and are negative.        --------------------------------------------- PAST HISTORY ---------------------------------------------  Past Medical History:  has a past medical history of History of pulmonary embolism; Macular degeneration; and Metabolic syndrome. Past Surgical History:  has a past surgical history that includes hernia repair; Colonoscopy (07/15/2003); and polypectomy. Social History:  reports that he quit smoking about 11 months ago. His smoking use included Cigarettes. He started smoking about 65 years ago. He has never used smokeless tobacco. He reports that he does not drink alcohol or use drugs. Family History: family history is not on file. The patients home medications have been reviewed. Allergies: Patient has no known allergies.         ---------------------------------------------------PHYSICAL EXAM--------------------------------------    Constitutional/General: Alert and oriented x3  Head: Normocephalic and atraumatic  Eyes: PERRL, EOMI, conjunctiva normal, sclera non icteric  Mouth: Oropharynx clear, handling secretions, no trismus, no asymmetry of the posterior oropharynx or uvular edema  Neck: Supple, full ROM, no stridor, no meningeal signs  Respiratory: Lungs clear to auscultation bilaterally, no wheezes, rales, or rhonchi. Not in respiratory distress  Cardiovascular:  Irregularly, irregular, bradycardic. No murmurs, no aortic murmurs, no gallops, or rubs. 2+ distal pulses. Equal extremity pulses. Chest: No chest wall tenderness  GI:  Abdomen Soft, Non tender, Non distended. +BS. No rebound, guarding, or rigidity. No pulsatile masses. Musculoskeletal: Moves all extremities x 4. Warm and well perfused, no clubbing, cyanosis. 2+ pitting edema in both lower extremities and feet. No palpable cords. Capillary refill <3 seconds  Integument: skin warm and dry. No rashes. Neurologic: GCS 15, no focal deficits          -------------------------------------------------- RESULTS -------------------------------------------------  I have personally reviewed all laboratory and imaging results for this patient. Results are listed below.      LABS:  Results for orders placed or performed during the hospital encounter of 09/01/18   CBC Auto Differential   Result Value Ref Range    WBC 6.2 4.5 - 11.5 E9/L    RBC 3.83 3.80 - 5.80 E12/L    Hemoglobin 11.4 (L) 12.5 - 16.5 g/dL    Hematocrit 34.9 (L) 37.0 - 54.0 %    MCV 91.1 80.0 - 99.9 fL    MCH 29.8 26.0 - 35.0 pg    MCHC 32.7 32.0 - 34.5 %    RDW 14.4 11.5 - 15.0 fL    Platelets 460 166 - 611 E9/L    MPV 11.0 7.0 - 12.0 fL    Neutrophils % 87.0 (H) 43.0 - 80.0 %    Lymphocytes % 5.2 (L) 20.0 - 42.0 %    Monocytes % 5.2 2.0 - 12.0 %    Eosinophils % 0.3 0.0 - 6.0 %    Basophils % 2.6 (H) 0.0 - 2.0 %    Neutrophils # 5.39 1.80 - 7.30 E9/L    Lymphocytes # 0.31 (L) 1.50 - 4.00 E9/L    Monocytes # 0.31 0.10 - 0.95 E9/L    Eosinophils # 0.00 (L) 0.05 - 0.50 E9/L    Basophils # 0.16 0.00 - 0.20 E9/L    Poikilocytes 2+     Arely Cells 2+     Ovalocytes 1+    Comprehensive Metabolic Panel   Result Value Ref Range    Sodium 142 132 - 146 mmol/L Potassium 4.4 3.5 - 5.0 mmol/L    Chloride 103 98 - 107 mmol/L    CO2 26 22 - 29 mmol/L    Anion Gap 13 7 - 16 mmol/L    Glucose 92 74 - 109 mg/dL    BUN 28 (H) 8 - 23 mg/dL    CREATININE 1.4 (H) 0.7 - 1.2 mg/dL    GFR Non-African American 58 >=60 mL/min/1.73    GFR African American 58     Calcium 9.4 8.6 - 10.2 mg/dL    Total Protein 7.3 6.4 - 8.3 g/dL    Alb 3.8 3.5 - 5.2 g/dL    Total Bilirubin 0.9 0.0 - 1.2 mg/dL    Alkaline Phosphatase 64 40 - 129 U/L    ALT 15 0 - 40 U/L    AST 26 0 - 39 U/L   Troponin   Result Value Ref Range    Troponin <0.01 0.00 - 0.03 ng/mL   Brain Natriuretic Peptide   Result Value Ref Range    Pro- (H) 0 - 450 pg/mL   Magnesium   Result Value Ref Range    Magnesium 2.1 1.6 - 2.6 mg/dL   Protime-INR   Result Value Ref Range    Protime 14.0 (H) 9.3 - 12.4 sec    INR 1.2    APTT   Result Value Ref Range    aPTT 32.8 24.5 - 35.1 sec   Urinalysis   Result Value Ref Range    Color, UA Yellow Straw/Yellow    Clarity, UA Clear Clear    Glucose, Ur Negative Negative mg/dL    Bilirubin Urine Negative Negative    Ketones, Urine 15 (A) Negative mg/dL    Specific Gravity, UA 1.010 1.005 - 1.030    Blood, Urine SMALL (A) Negative    pH, UA 6.5 5.0 - 9.0    Protein, UA Negative Negative mg/dL    Urobilinogen, Urine 0.2 <2.0 E.U./dL    Nitrite, Urine POSITIVE (A) Negative    Leukocyte Esterase, Urine MODERATE (A) Negative   Microscopic Urinalysis   Result Value Ref Range    WBC, UA 5-10 0 - 5 /HPF    RBC, UA 0-1 0 - 2 /HPF    Bacteria, UA MANY (A) /HPF   POCT Venous   Result Value Ref Range    POC Sodium 133 132 - 146 mmol/L    POC Potassium >12.0 (HH) 3.5 - 5.0 mmol/L    POC Chloride 114 (H) 100 - 108 mmol/L    POC Glucose 93 74 - 109 mg/dl    POC Creatinine 1.4 (H) 0.7 - 1.2 mg/dL    GFR Non-African American 48 >=60 mL/min/1.73    GFR African American 58    EKG 12 Lead   Result Value Ref Range    Ventricular Rate 56 BPM    Atrial Rate 56 BPM    QRS Duration 122 ms    Q-T Interval 440 ms    QTc

## 2018-09-02 PROBLEM — R41.82 ALTERED MENTAL STATUS: Status: ACTIVE | Noted: 2018-09-02

## 2018-09-02 LAB — TSH SERPL DL<=0.05 MIU/L-ACNC: 1.93 UIU/ML (ref 0.27–4.2)

## 2018-09-02 PROCEDURE — 97161 PT EVAL LOW COMPLEX 20 MIN: CPT

## 2018-09-02 PROCEDURE — 6370000000 HC RX 637 (ALT 250 FOR IP): Performed by: FAMILY MEDICINE

## 2018-09-02 PROCEDURE — 84443 ASSAY THYROID STIM HORMONE: CPT

## 2018-09-02 PROCEDURE — G8988 SELF CARE GOAL STATUS: HCPCS

## 2018-09-02 PROCEDURE — G0378 HOSPITAL OBSERVATION PER HR: HCPCS

## 2018-09-02 PROCEDURE — 97165 OT EVAL LOW COMPLEX 30 MIN: CPT

## 2018-09-02 PROCEDURE — 6360000002 HC RX W HCPCS: Performed by: FAMILY MEDICINE

## 2018-09-02 PROCEDURE — 2140000000 HC CCU INTERMEDIATE R&B

## 2018-09-02 PROCEDURE — 6370000000 HC RX 637 (ALT 250 FOR IP): Performed by: UROLOGY

## 2018-09-02 PROCEDURE — 96372 THER/PROPH/DIAG INJ SC/IM: CPT

## 2018-09-02 PROCEDURE — 36415 COLL VENOUS BLD VENIPUNCTURE: CPT

## 2018-09-02 PROCEDURE — G8987 SELF CARE CURRENT STATUS: HCPCS

## 2018-09-02 RX ORDER — AMLODIPINE BESYLATE 2.5 MG/1
2.5 TABLET ORAL DAILY
Status: DISCONTINUED | OUTPATIENT
Start: 2018-09-02 | End: 2018-09-05 | Stop reason: HOSPADM

## 2018-09-02 RX ADMIN — MEMANTINE HYDROCHLORIDE 10 MG: 10 TABLET, FILM COATED ORAL at 09:06

## 2018-09-02 RX ADMIN — POTASSIUM CHLORIDE 20 MEQ: 20 TABLET, EXTENDED RELEASE ORAL at 09:06

## 2018-09-02 RX ADMIN — MEMANTINE HYDROCHLORIDE 10 MG: 10 TABLET, FILM COATED ORAL at 20:41

## 2018-09-02 RX ADMIN — AMLODIPINE BESYLATE 2.5 MG: 2.5 TABLET ORAL at 13:37

## 2018-09-02 RX ADMIN — ENOXAPARIN SODIUM 40 MG: 40 INJECTION SUBCUTANEOUS at 09:06

## 2018-09-02 RX ADMIN — DONEPEZIL HYDROCHLORIDE 10 MG: 5 TABLET, FILM COATED ORAL at 20:41

## 2018-09-02 RX ADMIN — ASPIRIN 81 MG: 81 TABLET ORAL at 09:06

## 2018-09-02 RX ADMIN — CIPROFLOXACIN 250 MG: 500 TABLET, FILM COATED ORAL at 20:42

## 2018-09-02 RX ADMIN — CIPROFLOXACIN 250 MG: 500 TABLET, FILM COATED ORAL at 09:06

## 2018-09-02 RX ADMIN — FUROSEMIDE 20 MG: 20 TABLET ORAL at 09:06

## 2018-09-02 ASSESSMENT — PAIN SCALES - GENERAL
PAINLEVEL_OUTOF10: 0

## 2018-09-02 NOTE — CONSULTS
children: N/A    Years of education: N/A     Occupational History    Not on file. Social History Main Topics    Smoking status: Former Smoker     Types: Cigarettes     Start date: 1/1/1953     Quit date: 9/25/2017    Smokeless tobacco: Never Used    Alcohol use No      Comment: occasional    Drug use: No    Sexual activity: Not on file     Other Topics Concern    Not on file     Social History Narrative    No narrative on file       Allergies:  No Known Allergies    Home Medications:  Prior to Admission medications    Medication Sig Start Date End Date Taking?  Authorizing Provider   cephALEXin (KEFLEX) 500 MG capsule Take 1 capsule by mouth 2 times daily for 7 days 9/1/18 9/8/18 Yes Malu Joy MD   Memantine HCl-Donepezil HCl [facility]) 28-10 MG CP24 Take 1 tablet by mouth nightly 8/20/18  Yes Brando Penny DO   furosemide (LASIX) 20 MG tablet Take 1 tablet by mouth daily 8/20/18  Yes Brando Quintanilla DO   KLOR-CON M20 20 MEQ extended release tablet Take 1 tablet by mouth daily 8/20/18  Yes Brando Quintanilla DO   MYRBETRIQ 25 MG TB24 Take 1 tablet by mouth daily 8/20/18  Yes Brando Quintanilla DO   acetaminophen (TYLENOL) 500 MG tablet Take 500 mg by mouth every 4 hours as needed for Pain   Yes Historical Provider, MD   aspirin 81 MG tablet Take 81 mg by mouth daily   Yes Historical Provider, MD       Current Medications:  Current Facility-Administered Medications   Medication Dose Route Frequency Provider Last Rate Last Dose    amLODIPine (NORVASC) tablet 2.5 mg  2.5 mg Oral Daily Brando Quintanilla DO        perflutren lipid microspheres (DEFINITY) injection 1.65 mg  1.5 mL Intravenous ONCE PRN Nahed Royal, DO        acetaminophen (TYLENOL) tablet 500 mg  500 mg Oral Q4H PRN Nahed Royal, DO        aspirin EC tablet 81 mg  81 mg Oral Daily Brando Quintanilla DO   81 mg at 09/02/18 0906    furosemide (LASIX) tablet 20 mg  20 mg Oral Daily Brando Quintanilla DO   20 mg at 09/02/18 0906   
pulses. Skin:  Warm and dry, no open lesions or rashes. Neuro:  Cranial nerves 2-12 intact, no focal motor or sensory deficits. Alert and oriented. Rectal: Deferred  Genitalia:  Uncircumcised male without lesions or deformity. There is no phimosis. His meatus is patent and at the tip of his glans. Testicles are both normal in size and consistency and are nontender and without masses. He has no evidence of a hydrocele, hernia, varicocele bilaterally. His spermatic cord and vas deferens are normal bilaterally    LABS:    Lab Results       Component                Value               Date                       WBC                      6.2                 09/01/2018                 HGB                      11.4 (L)            09/01/2018                 HCT                      34.9 (L)            09/01/2018                 MCV                      91.1                09/01/2018                 PLT                      140                 09/01/2018              Lab Results       Component                Value               Date                       BUN                      28 (H)              09/01/2018                 CREATININE               1.4 (H)             09/01/2018              No results found for: PSA      ASSESSMENT / PLAN:    E. coli UTI/Overactive bladder with urge incontinence  He is voiding comfortably and can be managed without a Valdovinos. Bladder scan residuals were monitored and he is emptying his bladder very thoroughly without evidence of retention. His renal function is stable with a creatinine of 1.4. He'll continue with Cipro for resolution of his E. coli urinary tract infection. He will need a repeat urine culture in 1-2 weeks to make sure the infection has resolved. He will continue wearing diapers which will be changed periodically. There is no need for cystoscopy at this time. He will continue with oral Myrbetriq.  If this continues to be ineffective, office intravesical Botox may be

## 2018-09-02 NOTE — PLAN OF CARE
Problem: Pain:  Goal: Pain level will decrease  Pain level will decrease   Outcome: Met This Shift      Problem: Risk for Impaired Skin Integrity  Goal: Tissue integrity - skin and mucous membranes  Structural intactness and normal physiological function of skin and  mucous membranes.    Outcome: Ongoing

## 2018-09-02 NOTE — H&P
[address]                   [address] [facility],  [facility]                               HISTORY AND PHYSICAL    PATIENT NAME: Félix Shore                 :        1932  MED REC NO:   [id number]                            ROOM:       5414  ACCOUNT NO:   [de-identified]                           ADMIT DATE: 2018  PROVIDER:     Zabrina Santamaria DO    CHIEF COMPLAINT:  Late onset Alzheimer's disease, status post fall,  worsening ambulation requiring more assistance with ADLs, urinary  incontinence. HISTORY OF PRESENT ILLNESS:  The patient is an 71-year-old male well known  to me with late onset Alzheimer's disease who was discharged from [facility] after receiving rehabilitation back to home. Seen in the  office for followup in 08/15/2018. He was in a wheeled walker to ambulate,  not doing well at home. However, the patient fell two days ago, no severe  injury. The patient is a large man, who cannot be handled by his wife. The patient came to the emergency room for PT and OT evaluation and  returned to SNF. Probable urinary incontinence, no dysuria. Pleasant,  aware of surrounding. Denies pain. PAST MEDICAL HISTORY:  Late-onset Alzheimer's disease. The patient had a  remote PE. Has been treated for hypertension in the past.    MEDICATIONS:  Includes aspirin 81 mg daily, Aricept 10 mg nightly, Lasix 20  mg daily, Namenda 10 mg twice a day, mirabegron ER 25 mg daily, KCl 10 mEq  daily. SOCIAL HISTORY:  The patient is retired. Does not smoke or drink. FAMILY HISTORY:  Noncontributory. ALLERGIES:  No known allergies. REVIEW OF SYSTEMS:  SKIN:  Unremarkable. RESPIRATORY:  No wheeze, cough or shortness of breath. CARDIAC:  The patient's EKG was read as atrial fibrillation with slow  response. He is asymptomatic. GI:  The patient has fair amount of weight loss. He is an enormous man. No abdominal pain. No change in bowel habits. GENITOURINARY:  The patient has urinary incontinence. No dysuria. Bowel,  no black or tarry stools, or change in bowel habits. METABOLIC/ENDOCRINE:  No history of thyroid disease or diabetes. MUSCULOSKELETAL:  The patient has problem with balance. The patient is  probably one assist now to transfer and ambulate. The patient fell, but no  severe injury. NEUROLOGIC:  The patient is more amnestic. He is aware of his  surroundings. He recognizes me, very pleasant. PHYSICAL EXAMINATION:  GENERAL:  Presents pleasant. VITAL SIGNS:  Blood pressure 176/75, pulse 52. HEENT:  Head:  Normocephalic. Ears are patent. Eyes:  Nonicteric. Nose:   Patent. Mouth, teeth and throat:  Unremarkable. LUNGS:  Clear. Free from wheezes or rhonchi. HEART:  Regular. I do not hear the AFib. No murmur. BREASTS:  Normal male. ABDOMEN:  Soft and nontender. EXTREMITIES:  Trace ankle edema. NEUROLOGICAL:  The patient needs much assistance to stand. No evidence of  paralysis or weakness. PSYCHOLOGIC:  The patient is not depressed. He is just amnestic and needs  much more assistance with ADLs. DIAGNOSTIC IMPRESSION:  1. An 51-year-old male with Alzheimer's disease, no one _____ care for at  home, ultimately we arranged for SNF. 2.  Urinary incontinence probably secondary to Alzheimer's disease. Consult Urology. 3.  Hypertension, add amlodipine 2.5 mg daily. 4.  New-onset atrial fibrillation. We will consult Cardiology. Check  echocardiogram.  Discharge to nursing home when stable.         Thalia Bobo DO    D: 09/02/2018 10:04:04       T: 09/02/2018 11:24:21     [email or web address]  Job#: [id number]     Doc#: [id number]    CC:

## 2018-09-02 NOTE — PROGRESS NOTES
OCCUPATIONAL THERAPY INITIAL EVALUATION      Date:2018  Patient Name: Nakia Virgen  MRN: [id number]  : 1932  Room: [address] [place] [address]     Evaluating OT: Lara Masterson, OTR/L [id number]      Recommended Adaptive Equipment: [address], LB dressing AE, tub transfer bench      AM-PAC Inpatient Daily Activity Raw Score: 14/24  G code: CK    Diagnosis: Alzheimer's, falls at home     Past Medical History:   Diagnosis Date    History of pulmonary embolism     WITH RIGHT VENTRICULAR INFARCTION    Macular degeneration     Metabolic syndrome        Precautions: falls, dementia     Home Living: Pt lives with wife in a 1 floor plan with 6 steps to enter and 1 rail(s); bed/bath on first floor  Bathroom setup: tub/shower with tub seat and rail  Equipment owned: tub seat, rail, [address], cane    Prior Level of Function: Mod I with ADLs; Assist with IADLs. [address] mostly  for ambulation. Pt reports he was recently discharged home from [place] [address]; currently active with home therapy. Pt reports he is on \"medication for dementia. \"  Driving: yes  Occupation: retired    Pain Level: pt c/o no pain this session     Cognition: oriented x 3  Memory: Fair  Comprehension: Fair+  Problem Solving: Fair+  Safety: Fair     Vision/Perception  Hearing: min Ruby  Vision: WFL ; Glasses: yes [] no [] reading [x]  Perception: WFL      UE Assessment:  Hand Dominance: Right [x]  Left []     ROM Strength Additional Info:    RUE  WFL 4+/5 G  and G FMC/dexterity noted during ADL tasks     LUE WFL 4+/5 G  and G FMC/dexterity noted during ADL tasks     Sensation: WFL  Tone: WFL   Edema:min B LE/feet    Functional Assessment:   Initial Status  Comments   Feeding  S; set up    Grooming  Min A after set up    Upper Body Dressing Min A     Lower Body Dressing Max A    Bathing Mod A    Toileting  Mod A    Bed Mobility  Supine to Sit: NT  Sit to Supine:NT  *Pt seated up in chair upon entry     Functional Transfers Mod A sit<>stand with min cues for hand placment

## 2018-09-02 NOTE — PROGRESS NOTES
80year old with late onset Alzheimer's disease who was discharged from [place] [address] after receiving rehabilitation back to home. Seen in the office on 8/15 and was using a wheeled walker to ambulate. Not doing well at home. Patient fell two days ago. No severe injury. A large man who can not be handled by his wife. Patient came to the ER for PT/OT evaluation and return to SNF. Problem with urinary incontinence. No dysuria. Pleasant. Aware of surroundings. Denies pain. Blood pressure elevated 176/75. Pulse 52  Lungs clear. Heart regular. Trace leg edema. Plan: Alzheimer's disease- arrange for SNF. 2) Urinary incontinence- probably related to #1  3) Hypertension- add amlodipine 2.5 mg daily. 09-Jan-2018 18:54

## 2018-09-03 LAB
LV EF: 58 %
LVEF MODALITY: NORMAL
ORGANISM: ABNORMAL
URINE CULTURE, ROUTINE: ABNORMAL
URINE CULTURE, ROUTINE: ABNORMAL

## 2018-09-03 PROCEDURE — 6370000000 HC RX 637 (ALT 250 FOR IP): Performed by: UROLOGY

## 2018-09-03 PROCEDURE — 2140000000 HC CCU INTERMEDIATE R&B

## 2018-09-03 PROCEDURE — 6370000000 HC RX 637 (ALT 250 FOR IP): Performed by: FAMILY MEDICINE

## 2018-09-03 PROCEDURE — G0378 HOSPITAL OBSERVATION PER HR: HCPCS

## 2018-09-03 PROCEDURE — 93306 TTE W/DOPPLER COMPLETE: CPT

## 2018-09-03 PROCEDURE — 6360000002 HC RX W HCPCS: Performed by: FAMILY MEDICINE

## 2018-09-03 PROCEDURE — 96372 THER/PROPH/DIAG INJ SC/IM: CPT

## 2018-09-03 RX ADMIN — AMLODIPINE BESYLATE 2.5 MG: 2.5 TABLET ORAL at 09:16

## 2018-09-03 RX ADMIN — CIPROFLOXACIN 250 MG: 500 TABLET, FILM COATED ORAL at 20:55

## 2018-09-03 RX ADMIN — MEMANTINE HYDROCHLORIDE 10 MG: 10 TABLET, FILM COATED ORAL at 09:16

## 2018-09-03 RX ADMIN — ENOXAPARIN SODIUM 40 MG: 40 INJECTION SUBCUTANEOUS at 09:17

## 2018-09-03 RX ADMIN — ACETAMINOPHEN 500 MG: 500 TABLET, FILM COATED ORAL at 16:31

## 2018-09-03 RX ADMIN — ASPIRIN 81 MG: 81 TABLET ORAL at 09:16

## 2018-09-03 RX ADMIN — MEMANTINE HYDROCHLORIDE 10 MG: 10 TABLET, FILM COATED ORAL at 20:56

## 2018-09-03 RX ADMIN — POTASSIUM CHLORIDE 20 MEQ: 20 TABLET, EXTENDED RELEASE ORAL at 09:16

## 2018-09-03 RX ADMIN — DONEPEZIL HYDROCHLORIDE 10 MG: 5 TABLET, FILM COATED ORAL at 20:55

## 2018-09-03 RX ADMIN — FUROSEMIDE 20 MG: 20 TABLET ORAL at 09:16

## 2018-09-03 RX ADMIN — CIPROFLOXACIN 250 MG: 500 TABLET, FILM COATED ORAL at 09:16

## 2018-09-03 ASSESSMENT — PAIN SCALES - GENERAL
PAINLEVEL_OUTOF10: 0
PAINLEVEL_OUTOF10: 3
PAINLEVEL_OUTOF10: 0

## 2018-09-03 ASSESSMENT — PAIN DESCRIPTION - ONSET: ONSET: ON-GOING

## 2018-09-03 ASSESSMENT — PAIN DESCRIPTION - PROGRESSION: CLINICAL_PROGRESSION: NOT CHANGED

## 2018-09-03 ASSESSMENT — PAIN DESCRIPTION - LOCATION: LOCATION: LEG

## 2018-09-03 ASSESSMENT — PAIN DESCRIPTION - DESCRIPTORS: DESCRIPTORS: ACHING;DISCOMFORT;DULL

## 2018-09-03 ASSESSMENT — PAIN DESCRIPTION - PAIN TYPE: TYPE: ACUTE PAIN

## 2018-09-03 ASSESSMENT — PAIN DESCRIPTION - ORIENTATION: ORIENTATION: RIGHT;LEFT;LOWER

## 2018-09-03 ASSESSMENT — PAIN DESCRIPTION - FREQUENCY: FREQUENCY: INTERMITTENT

## 2018-09-03 NOTE — PROGRESS NOTES
Cardiology note appreciated. No evidence of atrial fibrillation. Pleasant. Denies pain. Agreeable to go to SNF tomorrow. Urology notes appreciated. Patient denies problem with voiding or dysuria. Blood pressure still elevated despite amlodipine 2.5 mg daily. Blood pressure 170/88. Pulse of 74  Lungs clear. Heart regular. Plan: s/p fall, Alzheimer's disease, UTI- continue regimen. 2) hypertension- consider increasing amlodipine dose tomorrow.

## 2018-09-03 NOTE — PLAN OF CARE
Problem: Falls - Risk of:  Goal: Will remain free from falls  Will remain free from falls   Outcome: Met This Shift      Problem: Pain:  Goal: Control of acute pain  Control of acute pain   Outcome: Met This Shift      Problem: Mobility - Impaired:  Goal: Mobility will improve  Mobility will improve   Outcome: Met This Shift  Pt.  Assisted up to a chair with 2 assistants, remains weak

## 2018-09-03 NOTE — PROGRESS NOTES
1.930 09/02/2018           Radiology:  CT ABDOMEN PELVIS W IV CONTRAST Additional Contrast? None   Final Result      1. Small nonspecific free fluid collection present within pelvis. 2. Thickened wall of the urinary bladder impart due to nondistention. Clinical correlation recommended for possibility of nonspecific   cystitis. CT Head WO Contrast   Final Result   Diffuse atrophy likely age related   Findings compatible with small vessel ischemic changes. XR CHEST PORTABLE   Final Result   Cardiomegaly   Findings compatible with atherosclerotic disease of the aorta. ASSESSMENT / PLAN:  1. Atrial arrhythmia-to my eye EKG was misread and looks like sinus rhythm with PACs eyes definitely see P waves before QRS complexes. On tele now still I thiok NSR frequent PACs (P paves low amplitude). Echo has been requested by PCP pending  Patient would be considered rather high risk for anything more than aspirin for anticoagulation due to history of dementia and fall risk. 2          recent fall gets around with a walker, denies syncope.   Needs rehab  3          prostate cancer recent surgery  4          Alzheimer's dementia  5          hypertension-continue home meds             Nikki Ojeda MD, [facility]  The [address] at [facility]    Electronically signed by Nikki Ojeda MD on 9/3/2018 at 10:06 AM

## 2018-09-04 PROCEDURE — 96372 THER/PROPH/DIAG INJ SC/IM: CPT

## 2018-09-04 PROCEDURE — 97530 THERAPEUTIC ACTIVITIES: CPT

## 2018-09-04 PROCEDURE — 6370000000 HC RX 637 (ALT 250 FOR IP): Performed by: UROLOGY

## 2018-09-04 PROCEDURE — 97535 SELF CARE MNGMENT TRAINING: CPT

## 2018-09-04 PROCEDURE — 2140000000 HC CCU INTERMEDIATE R&B

## 2018-09-04 PROCEDURE — 6370000000 HC RX 637 (ALT 250 FOR IP): Performed by: FAMILY MEDICINE

## 2018-09-04 PROCEDURE — 6360000002 HC RX W HCPCS: Performed by: FAMILY MEDICINE

## 2018-09-04 PROCEDURE — G0378 HOSPITAL OBSERVATION PER HR: HCPCS

## 2018-09-04 RX ADMIN — ASPIRIN 81 MG: 81 TABLET ORAL at 08:22

## 2018-09-04 RX ADMIN — FUROSEMIDE 20 MG: 20 TABLET ORAL at 08:21

## 2018-09-04 RX ADMIN — ENOXAPARIN SODIUM 40 MG: 40 INJECTION SUBCUTANEOUS at 08:22

## 2018-09-04 RX ADMIN — MEMANTINE HYDROCHLORIDE 10 MG: 10 TABLET, FILM COATED ORAL at 08:21

## 2018-09-04 RX ADMIN — DONEPEZIL HYDROCHLORIDE 10 MG: 5 TABLET, FILM COATED ORAL at 21:36

## 2018-09-04 RX ADMIN — CIPROFLOXACIN 250 MG: 500 TABLET, FILM COATED ORAL at 08:21

## 2018-09-04 RX ADMIN — MEMANTINE HYDROCHLORIDE 10 MG: 10 TABLET, FILM COATED ORAL at 21:36

## 2018-09-04 RX ADMIN — AMLODIPINE BESYLATE 2.5 MG: 2.5 TABLET ORAL at 08:21

## 2018-09-04 RX ADMIN — CIPROFLOXACIN 250 MG: 500 TABLET, FILM COATED ORAL at 21:36

## 2018-09-04 RX ADMIN — POTASSIUM CHLORIDE 20 MEQ: 20 TABLET, EXTENDED RELEASE ORAL at 08:21

## 2018-09-04 ASSESSMENT — PAIN SCALES - GENERAL
PAINLEVEL_OUTOF10: 0
PAINLEVEL_OUTOF10: 0

## 2018-09-04 NOTE — CARE COORDINATION
Transition of care: Met with patient in room. History of Alzheimer's. Called and spoke with his wife, Fer Pearson on the phone. He lives at home with his wife in a tri-level. Independent with ADLs per wife. DME- FWW, bp cuff, wheelchair. History of hhc with [facility] and rehab was at [facility]. We discussed last PT eval and need for rehab. PT eval on 09/02 AM PAC 11/24 and ambulated 3 feet with [employer] with mod assist. She requested for him to go to [facility]. Referral made to [facility] at [place] [address]. PCP is Dr. Goyo De La Torre and pharmacy is [address] [place] [address] in [facility]. Sw/cm will follow.

## 2018-09-04 NOTE — PLAN OF CARE
Problem: Falls - Risk of:  Goal: Will remain free from falls  Will remain free from falls   Outcome: Met This Shift      Problem: Pain:  Goal: Control of acute pain  Control of acute pain   Outcome: Met This Shift      Problem: Mobility - Impaired:  Goal: Mobility will improve  Mobility will improve   Outcome: Ongoing

## 2018-09-04 NOTE — PLAN OF CARE
Problem: Pain:  Goal: Pain level will decrease  Pain level will decrease   Outcome: Met This Shift      Problem: Risk for Impaired Skin Integrity  Goal: Tissue integrity - skin and mucous membranes  Structural intactness and normal physiological function of skin and  mucous membranes.    Outcome: Met This Shift

## 2018-09-04 NOTE — PROGRESS NOTES
[facility] [address]    Name: Buster Cheadle    Age: 80 y.o. PCP: Kimberly Shaw DO    Date of Admission: 9/1/2018  7:17 AM    Date of Service: 9/4/2018    Chief Complaint: Follow-up for atrial arrhythmia  The patient is a 80y.o. year old male admitted yesterday for a fall. Complains of becoming increasingly weak since his discharge from rehab following his prostate surgery. States he does have some dementia. Gets around with a walker at home. Is here for further care. EKG was read by the computer as atrial fibrillation so cardiology consult. Reviewed that EKG there appears to be P waves before each QRS complex though low voltage. There are PACs. I do not identify atrial fibrillation. Patient has no complaints of chest pain palpitations or shortness of breath. Denies any history of stroke there does state he has some dementia. Interim History:  No new overnight cardiac complaints. Currently with no complaints of CP, SOB, palpitations, dizziness, or lightheadedness. Reviewed ECHO LVH, EF 55-60%, mild LAE,borderline RVE-nl function  Telemetry personally reviewed and showed sinus rhythm(low P wave amplitude maybe short PRinterval) , PACs, no significant pauses      Review of Systems:   Cardiac: As per HPI  General: No fever, chills  Pulmonary: No cough, wheeze, or shortness of breath  GI: No nausea, vomiting,or abdominal pain  Neuro: No headache or confusion    Problem List:  Active Problems:    Alzheimer's dementia with behavioral disturbance    Altered mental status  Resolved Problems:    * No resolved hospital problems.  *      Past Medical History:  Past Medical History:   Diagnosis Date    History of pulmonary embolism     WITH RIGHT VENTRICULAR INFARCTION    Macular degeneration     Metabolic syndrome        Allergies:  No Known Allergies    Current Medications:  Current Facility-Administered Medications   Medication Dose Route Frequency Provider Last Rate Last Dose    amLODIPine (NORVASC) tablet 2.5 mg  2.5 mg Oral Daily Brando Quintanilla, DO   2.5 mg at 09/03/18 7590    perflutren lipid microspheres (DEFINITY) injection 1.65 mg  1.5 mL Intravenous ONCE PRN Nahed Royal, DO        acetaminophen (TYLENOL) tablet 500 mg  500 mg Oral Q4H PRN Nahed Royal DO   500 mg at 09/03/18 1631    aspirin EC tablet 81 mg  81 mg Oral Daily Brando Quintanilla, DO   81 mg at 09/03/18 9293    furosemide (LASIX) tablet 20 mg  20 mg Oral Daily Brando Quintanilla, DO   20 mg at 09/03/18 0916    potassium chloride (KLOR-CON M) extended release tablet 20 mEq  20 mEq Oral Daily Brando Quintanilla, DO   20 mEq at 09/03/18 0916    Mirabegron ER TB24 25 mg  25 mg Oral Daily Brando Quintanilla DO        enoxaparin (LOVENOX) injection 40 mg  40 mg Subcutaneous Daily Brando Quintanilla DO   40 mg at 09/03/18 6645    ciprofloxacin (CIPRO) tablet 250 mg  250 mg Oral 2 times per day Meir Chi MD   250 mg at 09/03/18 2055    memantine (NAMENDA) tablet 10 mg  10 mg Oral BID Nahed Royal, DO   10 mg at 09/03/18 2056    And    donepezil (ARICEPT) tablet 10 mg  10 mg Oral Nightly Nahed Royal, DO   10 mg at 09/03/18 2055         Physical Exam:  /80   Pulse 63   Temp 97.9 °F (36.6 °C) (Temporal)   Resp 16   Ht 6' 3\" (1.905 m)   Wt 206 lb 0.1 oz (93.4 kg)   SpO2 99%   BMI 25.75 kg/m²   Weight change: -4 lb 9.5 oz (-2.084 kg)  Wt Readings from Last 3 Encounters:   09/04/18 206 lb 0.1 oz (93.4 kg)   08/15/18 215 lb (97.5 kg)   06/16/18 212 lb 8 oz (96.4 kg)     General: Awake, alert, oriented x3, up in chair no acute distress  Neck: No JVD, carotid bruits, thyromegaly, or lymphadenopathy  Cardiac: Regular rate and rhythm, normal S1 and split S2, no murmurs, no S3 or S4, no pericardial rubs.   Carotid upstrokes brisk  Resp: clear bilaterally without wheezes, rhonchi, or rales; unlabored respirations  Abdomen: soft, nontender, nondistended, BS+; no masses, bruits, or Thickened wall of the urinary bladder impart due to nondistention. Clinical correlation recommended for possibility of nonspecific   cystitis. CT Head WO Contrast   Final Result   Diffuse atrophy likely age related   Findings compatible with small vessel ischemic changes. XR CHEST PORTABLE   Final Result   Cardiomegaly   Findings compatible with atherosclerotic disease of the aorta. ECHO:  Summary   Moderate left ventricular concentric hypertrophy noted.   Ejection fraction is visually estimated at 55-60%.   Borderline dilated right ventricle.   Right ventricle global systolic function is normal .   The left atrium is mildly dilated.   The aortic valve appears mildly sclerotic.         ASSESSMENT / PLAN:  1. Atrial arrhythmia-to my eye EKG was misread and looks like sinus rhythm with PACs eyes definitely see P waves before QRS complexes. On tele now still I thiok NSR frequent PACs (P paves low amplitude). Patient would be considered rather high risk for anything more than aspirin for anticoagulation due to history of dementia and fall risk, but at present not much to suggest AF. Stable for rehab. Will sign off.  2          recent fall gets around with a walker, denies syncope.   Needs rehab  3          prostate cancer recent surgery  4          Alzheimer's dementia  5          hypertension-continue home meds             Sravanthi Sexton MD, [facility]  The [address] at [facility]    Electronically signed by Sravanthi Sexton MD on 9/4/2018 at 8:00 AM

## 2018-09-04 NOTE — PROGRESS NOTES
OT BEDSIDE TREATMENT NOTE      Date:2018  Patient Name: Esperanza Canada  MRN: [id number]  : 1932  Room: [address] [place], [place] [address]     Evaluating OT: Kristen Pitts, OTR/L [id number]        Recommended Adaptive Equipment: WW, LB dressing AE, tub transfer bench       AM-PAC Inpatient Daily Activity Raw Score: 14/24  G code: CK     Diagnosis: Alzheimer's, falls at home     Past Medical History        Past Medical History:   Diagnosis Date    History of pulmonary embolism       WITH RIGHT VENTRICULAR INFARCTION    Macular degeneration      Metabolic syndrome              Precautions: falls, dementia     Home Living: Pt lives with wife in a 1 floor plan with 6 steps to enter and 1 rail(s); bed/bath on first floor  Bathroom setup: tub/shower with tub seat and rail  Equipment owned: tub seat, rail, [employer], cane     Prior Level of Function: Mod I with ADLs; Assist with IADLs. [employer] mostly  for ambulation. Pt reports he was recently discharged home from [place] [address]; currently active with home therapy. Pt reports he is on \"medication for dementia. \"  Driving: yes  Occupation: retired     Pain Level: pt c/o no pain this session      Cognition: oriented x 3  Memory: Fair  Comprehension: Fair  Problem Solving: Fair+  Safety: Fair       Goals:  GOAL (1) Increase feeding skills to Mod I while seated up in chair to increase activity tolerance  GOAL (2) Increase grooming skills to S seated/standing sink level with SBA for balance & G activity tolerance  GOAL (3) Increase UB dressing/bathing to S; set up  GOAL (4) Increase LB dressing/bathing to Min A using AE as needed for safe reach/energy conservation & demonstrating F+ standing balance/ G safety  GOAL (5) Increase functional transfers/bathroom mobility to Min A using AD/DME as needed for balance/energy conservation & F+ /G safety         Functional Assessment:    Initial Status  Tx Session 18   Feeding  S; set up  S/Setup   Grooming  Min A after set up [employer] tasks in sitting and standing positions. Education on posture, safety, body mechanics. Min cues to initiate and sequence steps. Upper Body Dressing Min A   Min A  Facilitated donning/doffing gown while seated EOB  Education/min cues to initiate and attend to task. SBA for sitting balance   Lower Body Dressing Max A  Total A  B socks   Bathing Mod A  Mod A  Facilitated UB/LB bathing task while alternating between sitting and standing positions. Increased assist for LB. Educated on energy conservation techniques and modified strategies. Mod cues for thoroughness and attention. Toileting  Mod A  Mod A     Bed Mobility  Supine to Sit: NT  Sit to Supine:NT  *Pt seated up in chair upon entry   supine to sit: Mod A  Sit to supine: Mod A  Education/cues for body mechanics, attention and sequencing   Functional Transfers Mod A sit<>stand with min cues for hand placment   Mod A  Sit><stand from EOB 4x w/ ww  Education/mod cues for safety/hand placement. As progressed, pt required increased time to reach full stand   Functional Mobility NT  Min A  Facilitated 4 steps to [facility] w/ ww  Education/cues for initiation, ww management and safety. Limited mobility secondary to increased fatigue following ADL training.      Sit balance: SBA  Stand balance: Min A for static standing w/ ww, 2 trials w/ seated rest break between. Education/cues for posture, hand placement on device. Facilitated light functional reaching activity (in various planes) when standing w/ ww and mod A. Alternated UE support on device. Education/mod cues for hand placement, safety, body mechanics and sequencing. Endurance/Activity tolerance: fair                   Comments: Upon arrival pt lying in bed. At end of session lying in bed (per pt request) all lines and tubes intact, call light within reach. Safety alarm on    · Pt has made Fair progress towards set goals.    · Continue with current plan of care    Time in: 13:44  Time out:14:09  Total Tx Time: 25

## 2018-09-05 VITALS
HEART RATE: 69 BPM | RESPIRATION RATE: 18 BRPM | OXYGEN SATURATION: 97 % | WEIGHT: 206.6 LBS | TEMPERATURE: 98.7 F | SYSTOLIC BLOOD PRESSURE: 122 MMHG | BODY MASS INDEX: 25.69 KG/M2 | HEIGHT: 75 IN | DIASTOLIC BLOOD PRESSURE: 67 MMHG

## 2018-09-05 PROCEDURE — 96372 THER/PROPH/DIAG INJ SC/IM: CPT

## 2018-09-05 PROCEDURE — 97535 SELF CARE MNGMENT TRAINING: CPT

## 2018-09-05 PROCEDURE — 6370000000 HC RX 637 (ALT 250 FOR IP): Performed by: FAMILY MEDICINE

## 2018-09-05 PROCEDURE — G0378 HOSPITAL OBSERVATION PER HR: HCPCS

## 2018-09-05 PROCEDURE — 97530 THERAPEUTIC ACTIVITIES: CPT

## 2018-09-05 PROCEDURE — 6370000000 HC RX 637 (ALT 250 FOR IP): Performed by: UROLOGY

## 2018-09-05 PROCEDURE — 6360000002 HC RX W HCPCS: Performed by: FAMILY MEDICINE

## 2018-09-05 RX ORDER — AMLODIPINE BESYLATE 2.5 MG/1
2.5 TABLET ORAL DAILY
Qty: 30 TABLET | Refills: 3 | Status: SHIPPED | OUTPATIENT
Start: 2018-09-05 | End: 2018-11-02 | Stop reason: SDUPTHER

## 2018-09-05 RX ADMIN — POTASSIUM CHLORIDE 20 MEQ: 20 TABLET, EXTENDED RELEASE ORAL at 08:36

## 2018-09-05 RX ADMIN — ASPIRIN 81 MG: 81 TABLET ORAL at 08:36

## 2018-09-05 RX ADMIN — ENOXAPARIN SODIUM 40 MG: 40 INJECTION SUBCUTANEOUS at 08:36

## 2018-09-05 RX ADMIN — MEMANTINE HYDROCHLORIDE 10 MG: 10 TABLET, FILM COATED ORAL at 08:36

## 2018-09-05 RX ADMIN — FUROSEMIDE 20 MG: 20 TABLET ORAL at 08:36

## 2018-09-05 RX ADMIN — CIPROFLOXACIN 250 MG: 500 TABLET, FILM COATED ORAL at 08:36

## 2018-09-05 RX ADMIN — AMLODIPINE BESYLATE 2.5 MG: 2.5 TABLET ORAL at 08:36

## 2018-09-05 ASSESSMENT — PAIN SCALES - GENERAL: PAINLEVEL_OUTOF10: 0

## 2018-09-05 NOTE — PROGRESS NOTES
OT BEDSIDE TREATMENT NOTE      Date:2018  Patient Name: Judy Kelly  MRN: [id number]  : 1932  Room: [address] [place] [address]     Evaluating OT: Solomon Maddox OTR/L [id number]     Recommended Adaptive Equipment: [employer], LB dressing AE, tub transfer bench       AM-PAC Inpatient Daily Activity Raw Score: 15/24   G code: CK     Diagnosis: Alzheimer's, falls at home     Past Medical History        Past Medical History:   Diagnosis Date    History of pulmonary embolism       WITH RIGHT VENTRICULAR INFARCTION    Macular degeneration      Metabolic syndrome           Precautions: falls, dementia     Home Living: Pt lives with wife in a 1 floor plan with 6 steps to enter and 1 rail(s); bed/bath on first floor  Bathroom setup: tub/shower with tub seat and rail  Equipment owned: tub seat, rail, [employer], cane     Prior Level of Function: Mod I with ADLs; Assist with IADLs. [employer] mostly  for ambulation. Pt reports he was recently discharged home from [place] [address]; currently active with home therapy. Pt reports he is on \"medication for dementia. \"  Driving: yes  Occupation: retired     Pain Level: Pt c/o pain in B knees 7/10. Did not rate.      Cognition: Alert & oriented x3. Follows 1-2 step commands. Fair safety/problem solving. Goals:  GOAL (1) Increase feeding skills to Mod I while seated up in chair to increase activity tolerance  GOAL (2) Increase grooming skills to S seated/standing sink level with SBA for balance & G activity tolerance  GOAL (3) Increase UB dressing/bathing to S; set up  GOAL (4) Increase LB dressing/bathing to Min A using AE as needed for safe reach/energy conservation & demonstrating F+ standing balance/ G safety  GOAL (5) Increase functional transfers/bathroom mobility to Min A using AD/DME as needed for balance/energy conservation & F+ /G safety     Functional Assessment:    Initial Status  Tx Session 18   Feeding  S; set up  S/Setup  Seated in chair.    Grooming  Min A after set up  SBA  Washing face/hands

## 2018-09-05 NOTE — PROGRESS NOTES
Nurse to nurse report called to Jazmine Benton at [facility] and all paperwork faxed to [phone] at this time.

## 2018-09-05 NOTE — PROGRESS NOTES
Physical Therapy  Facility/Department: [facility] 6 PCCU 1  Daily Treatment Note  NAME: Delores Strickland  : 1932  MRN: [id number]    Date of Service: 2018   Patient Diagnosis(es): The primary encounter diagnosis was Acute cystitis without hematuria. A diagnosis of Peripheral edema was also pertinent to this visit.      has a past medical history of History of pulmonary embolism; Macular degeneration; and Metabolic syndrome. has a past surgical history that includes hernia repair; Colonoscopy (07/15/2003); and polypectomy.     Evaluating Therapist: Mariluz Snowden PT        Room #:6510-A  DIAGNOSIS: Alzheimer's, falls at home  PRECAUTIONS: falls     Social:  Pt lives with wife in a 1 floor plan 6 steps and 1 rails to enter. Pt with recent [facility] stay  Prior to admission per pt independent with ww                Initial Evaluation  Date:  Treatment        Short Term/ Long Term   Goals   Was pt agreeable to Eval/treatment? yes  yes     Does pt have pain? No c/o pain  c/o B foot pain secondary to edema     Bed Mobility  Rolling: mod assist  Supine to sit: mod assist  Sit to supine: NT  Scooting: mod assist  NT pt seated in bedside chair on arrival  Min assist   Transfers Sit to stand: mod assist  Stand to sit: mod assist  Stand pivot: mod assist  Sit to stand: Min A  Stand to sit: Min A  Stand pivot: Min A Min assist   Ambulation    3 feet with ww with mod assist. Difficulty moving B LEs  10 feet with ww Min A with chair follow 50 feet with ww with min assist   Stair Negotiation  Ascended and descended  NT  NT     AM-PAC Raw score                         Pt performed therapeutic exercise of the following: Seated B LE AP's and LAQ until fatigued.      Patient education  Pt was educated on sit to stand transfers    Patient response to education:   Pt verbalized understanding Pt demonstrated skill Pt requires further education in this area   x x x     Additional Comments: Pt sitting in bedside chair on

## 2018-10-08 ENCOUNTER — APPOINTMENT (OUTPATIENT)
Dept: GENERAL RADIOLOGY | Age: 83
End: 2018-10-08
Payer: MEDICARE

## 2018-10-08 ENCOUNTER — APPOINTMENT (OUTPATIENT)
Dept: CT IMAGING | Age: 83
End: 2018-10-08
Payer: MEDICARE

## 2018-10-08 ENCOUNTER — HOSPITAL ENCOUNTER (EMERGENCY)
Age: 83
Discharge: HOME | End: 2018-10-08
Attending: EMERGENCY MEDICINE
Payer: MEDICARE

## 2018-10-08 VITALS
DIASTOLIC BLOOD PRESSURE: 82 MMHG | SYSTOLIC BLOOD PRESSURE: 162 MMHG | TEMPERATURE: 97.6 F | OXYGEN SATURATION: 98 % | RESPIRATION RATE: 16 BRPM | HEART RATE: 70 BPM
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
## 2018-10-08 PROCEDURE — 73562 X-RAY EXAM OF KNEE 3: CPT

## 2018-10-08 PROCEDURE — 72125 CT NECK SPINE W/O DYE: CPT

## 2018-10-08 PROCEDURE — 70450 CT HEAD/BRAIN W/O DYE: CPT

## 2018-10-08 PROCEDURE — 99284 EMERGENCY DEPT VISIT MOD MDM: CPT

## 2018-10-08 ASSESSMENT — PAIN SCALES - GENERAL: PAINLEVEL_OUTOF10: 8

## 2018-10-08 ASSESSMENT — PAIN DESCRIPTION - ORIENTATION: ORIENTATION: LEFT

## 2018-10-08 ASSESSMENT — ENCOUNTER SYMPTOMS
SHORTNESS OF BREATH: 0
BACK PAIN: 0
ABDOMINAL PAIN: 0
COUGH: 0

## 2018-10-08 ASSESSMENT — PAIN DESCRIPTION - LOCATION: LOCATION: KNEE

## 2018-10-08 ASSESSMENT — PAIN DESCRIPTION - DESCRIPTORS: DESCRIPTORS: ACHING

## 2018-10-08 ASSESSMENT — PAIN DESCRIPTION - PAIN TYPE: TYPE: ACUTE PAIN

## 2018-12-10 NOTE — FLOWSHEET NOTE
Episode opened in error Inpatient Wound Care    Admit Date: 6/14/2018  8:07 PM    Reason for consult:  Buttocks    Significant history:  Admitted with DINA. History includes: PE and macular degeneration. Wound history:  POA    Findings:       06/15/18 1122   Wound 06/15/18 Buttocks Right   Date First Assessed/Time First Assessed: 06/15/18 0400   Location: Buttocks  Wound Location Orientation: Right  Pre-existing: Yes   Wound Type Wound   Wound Pressure Stage  2   Wound Length (cm) 2 cm   Wound Width (cm) 1 cm   Wound Depth (cm)  0.1   Calculated Wound Size (cm^2) (l*w) 2 cm^2   Change in Wound Size % (l*w) 83.33   Wound Assessment Red   Drainage Amount None   [place]-wound Assessment Intact       Impression:  Stage 2 R. Buttock, heels intact. Skin very dry.      Interventions in place:  SOS precautions    Plan: Aquaphor to buttocks and lower legs      Mariya Bowman 6/15/2018 11:23 AM

## 2019-01-01 ENCOUNTER — APPOINTMENT (OUTPATIENT)
Dept: CT IMAGING | Age: 84
End: 2019-01-01
Payer: MEDICARE

## 2019-01-01 ENCOUNTER — TELEPHONE (OUTPATIENT)
Dept: CARDIOLOGY CLINIC | Age: 84
End: 2019-01-01

## 2019-01-01 ENCOUNTER — APPOINTMENT (OUTPATIENT)
Dept: GENERAL RADIOLOGY | Age: 84
DRG: 177 | End: 2019-01-01
Payer: MEDICARE

## 2019-01-01 ENCOUNTER — APPOINTMENT (OUTPATIENT)
Dept: CT IMAGING | Age: 84
DRG: 684 | End: 2019-01-01
Payer: MEDICARE

## 2019-01-01 ENCOUNTER — APPOINTMENT (OUTPATIENT)
Dept: GENERAL RADIOLOGY | Age: 84
DRG: 176 | End: 2019-01-01
Payer: MEDICARE

## 2019-01-01 ENCOUNTER — HOSPITAL ENCOUNTER (INPATIENT)
Age: 84
LOS: 4 days | Discharge: NURSING, REHAB OR LONG-TERM CARE | DRG: 176 | End: 2019-09-03
Attending: EMERGENCY MEDICINE | Admitting: INTERNAL MEDICINE
Payer: MEDICARE

## 2019-01-01 ENCOUNTER — APPOINTMENT (OUTPATIENT)
Dept: GENERAL RADIOLOGY | Age: 84
DRG: 684 | End: 2019-01-01
Payer: MEDICARE

## 2019-01-01 ENCOUNTER — APPOINTMENT (OUTPATIENT)
Dept: GENERAL RADIOLOGY | Age: 84
DRG: 948 | End: 2019-01-01
Payer: MEDICARE

## 2019-01-01 ENCOUNTER — APPOINTMENT (OUTPATIENT)
Dept: CT IMAGING | Age: 84
DRG: 177 | End: 2019-01-01
Payer: MEDICARE

## 2019-01-01 ENCOUNTER — APPOINTMENT (OUTPATIENT)
Dept: CT IMAGING | Age: 84
DRG: 176 | End: 2019-01-01
Payer: MEDICARE

## 2019-01-01 ENCOUNTER — APPOINTMENT (OUTPATIENT)
Dept: ULTRASOUND IMAGING | Age: 84
DRG: 176 | End: 2019-01-01
Payer: MEDICARE

## 2019-01-01 ENCOUNTER — HOSPITAL ENCOUNTER (INPATIENT)
Age: 84
LOS: 2 days | Discharge: HOME HEALTH | DRG: 948 | End: 2019-05-28
Attending: EMERGENCY MEDICINE | Admitting: HOSPITALIST
Payer: MEDICARE

## 2019-01-01 ENCOUNTER — APPOINTMENT (OUTPATIENT)
Dept: CT IMAGING | Age: 84
DRG: 948 | End: 2019-01-01
Payer: MEDICARE

## 2019-01-01 ENCOUNTER — HOSPITAL ENCOUNTER (EMERGENCY)
Age: 84
Discharge: HOME | End: 2019-04-05
Attending: EMERGENCY MEDICINE
Payer: MEDICARE

## 2019-01-01 ENCOUNTER — HOSPITAL ENCOUNTER (INPATIENT)
Age: 84
LOS: 2 days | Discharge: HOME | DRG: 684 | End: 2019-08-21
Attending: EMERGENCY MEDICINE | Admitting: HOSPITALIST
Payer: MEDICARE

## 2019-01-01 ENCOUNTER — APPOINTMENT (OUTPATIENT)
Dept: GENERAL RADIOLOGY | Age: 84
End: 2019-01-01
Payer: MEDICARE

## 2019-01-01 ENCOUNTER — HOSPITAL ENCOUNTER (INPATIENT)
Age: 84
LOS: 9 days | Discharge: NURSING, REHAB OR LONG-TERM CARE | DRG: 177 | End: 2020-01-03
Attending: EMERGENCY MEDICINE | Admitting: INTERNAL MEDICINE
Payer: MEDICARE

## 2019-01-01 VITALS
DIASTOLIC BLOOD PRESSURE: 61 MMHG | BODY MASS INDEX: 25.61 KG/M2 | SYSTOLIC BLOOD PRESSURE: 122 MMHG | TEMPERATURE: 97.7 F | RESPIRATION RATE: 18 BRPM | HEIGHT: 75 IN | HEART RATE: 67 BPM | WEIGHT: 206 LBS | OXYGEN SATURATION: 98 %

## 2019-01-01 VITALS
SYSTOLIC BLOOD PRESSURE: 161 MMHG | HEIGHT: 76 IN | DIASTOLIC BLOOD PRESSURE: 85 MMHG | TEMPERATURE: 97.8 F | HEART RATE: 79 BPM | OXYGEN SATURATION: 97 % | WEIGHT: 227 LBS | RESPIRATION RATE: 18 BRPM | BODY MASS INDEX: 27.64 KG/M2

## 2019-01-01 VITALS
OXYGEN SATURATION: 99 % | RESPIRATION RATE: 18 BRPM | SYSTOLIC BLOOD PRESSURE: 132 MMHG | BODY MASS INDEX: 26.14 KG/M2 | WEIGHT: 210.2 LBS | TEMPERATURE: 97.2 F | DIASTOLIC BLOOD PRESSURE: 64 MMHG | HEART RATE: 66 BPM | HEIGHT: 75 IN

## 2019-01-01 VITALS
HEART RATE: 84 BPM | DIASTOLIC BLOOD PRESSURE: 78 MMHG | BODY MASS INDEX: 25 KG/M2 | RESPIRATION RATE: 18 BRPM | TEMPERATURE: 98.2 F | WEIGHT: 200 LBS | OXYGEN SATURATION: 98 % | SYSTOLIC BLOOD PRESSURE: 136 MMHG

## 2019-01-01 DIAGNOSIS — R53.1 GENERALIZED WEAKNESS: ICD-10-CM
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
## 2019-01-01 DIAGNOSIS — S39.012A STRAIN OF LUMBAR REGION, INITIAL ENCOUNTER: Primary | ICD-10-CM

## 2019-01-01 DIAGNOSIS — I26.99 ACUTE PULMONARY EMBOLISM WITHOUT ACUTE COR PULMONALE, UNSPECIFIED PULMONARY EMBOLISM TYPE (HCC): Primary | ICD-10-CM

## 2019-01-01 DIAGNOSIS — R41.82 ALTERED MENTAL STATUS, UNSPECIFIED ALTERED MENTAL STATUS TYPE: Primary | ICD-10-CM

## 2019-01-01 DIAGNOSIS — N17.9 AKI (ACUTE KIDNEY INJURY) (HCC): ICD-10-CM

## 2019-01-01 DIAGNOSIS — I48.91 ATRIAL FIBRILLATION, NEW ONSET (HCC): ICD-10-CM

## 2019-01-01 DIAGNOSIS — R55 NEAR SYNCOPE: ICD-10-CM

## 2019-01-01 DIAGNOSIS — R55 SYNCOPE AND COLLAPSE: ICD-10-CM

## 2019-01-01 LAB
ACINETOBACTER BAUMANNII BY PCR: NOT DETECTED
ALBUMIN SERPL-MCNC: 2.4 G/DL (ref 3.5–5.2)
ALBUMIN SERPL-MCNC: 2.5 G/DL (ref 3.5–5.2)
ALBUMIN SERPL-MCNC: 2.5 G/DL (ref 3.5–5.2)
ALBUMIN SERPL-MCNC: 2.6 G/DL (ref 3.5–5.2)
ALBUMIN SERPL-MCNC: 2.7 G/DL (ref 3.5–5.2)
ALBUMIN SERPL-MCNC: 2.9 G/DL (ref 3.5–5.2)
ALBUMIN SERPL-MCNC: 3.1 G/DL (ref 3.5–5.2)
ALBUMIN SERPL-MCNC: 3.4 G/DL (ref 3.5–5.2)
ALBUMIN SERPL-MCNC: 3.7 G/DL (ref 3.5–5.2)
ALBUMIN SERPL-MCNC: 3.8 G/DL (ref 3.5–5.2)
ALBUMIN SERPL-MCNC: 4.2 G/DL (ref 3.5–5.2)
ALP BLD-CCNC: 103 U/L (ref 40–129)
ALP BLD-CCNC: 62 U/L (ref 40–129)
ALP BLD-CCNC: 62 U/L (ref 40–129)
ALP BLD-CCNC: 63 U/L (ref 40–129)
ALP BLD-CCNC: 64 U/L (ref 40–129)
ALP BLD-CCNC: 66 U/L (ref 40–129)
ALP BLD-CCNC: 69 U/L (ref 40–129)
ALP BLD-CCNC: 71 U/L (ref 40–129)
ALP BLD-CCNC: 82 U/L (ref 40–129)
ALT SERPL-CCNC: 14 U/L (ref 0–40)
ALT SERPL-CCNC: 15 U/L (ref 0–40)
ALT SERPL-CCNC: 16 U/L (ref 0–40)
ALT SERPL-CCNC: 17 U/L (ref 0–40)
ALT SERPL-CCNC: 17 U/L (ref 0–40)
ALT SERPL-CCNC: 21 U/L (ref 0–40)
ALT SERPL-CCNC: 24 U/L (ref 0–40)
ALT SERPL-CCNC: 25 U/L (ref 0–40)
ALT SERPL-CCNC: 25 U/L (ref 0–40)
ALT SERPL-CCNC: 26 U/L (ref 0–40)
ALT SERPL-CCNC: 33 U/L (ref 0–40)
ANION GAP SERPL CALCULATED.3IONS-SCNC: 10 MMOL/L (ref 7–16)
ANION GAP SERPL CALCULATED.3IONS-SCNC: 11 MMOL/L (ref 7–16)
ANION GAP SERPL CALCULATED.3IONS-SCNC: 12 MMOL/L (ref 7–16)
ANION GAP SERPL CALCULATED.3IONS-SCNC: 13 MMOL/L (ref 7–16)
ANION GAP SERPL CALCULATED.3IONS-SCNC: 14 MMOL/L (ref 7–16)
ANION GAP SERPL CALCULATED.3IONS-SCNC: 14 MMOL/L (ref 7–16)
ANION GAP SERPL CALCULATED.3IONS-SCNC: 15 MMOL/L (ref 7–16)
ANION GAP SERPL CALCULATED.3IONS-SCNC: 16 MMOL/L (ref 7–16)
ANION GAP SERPL CALCULATED.3IONS-SCNC: 8 MMOL/L (ref 7–16)
ANION GAP SERPL CALCULATED.3IONS-SCNC: 9 MMOL/L (ref 7–16)
APTT: 107.7 SEC (ref 24.5–35.1)
APTT: 149.6 SEC (ref 24.5–35.1)
APTT: 178.3 SEC (ref 24.5–35.1)
APTT: 34.2 SEC (ref 24.5–35.1)
APTT: 39.2 SEC (ref 24.5–35.1)
APTT: 43.1 SEC (ref 24.5–35.1)
APTT: 65.8 SEC (ref 24.5–35.1)
APTT: 72.4 SEC (ref 24.5–35.1)
APTT: 77.3 SEC (ref 24.5–35.1)
APTT: 79.7 SEC (ref 24.5–35.1)
APTT: >240 SEC (ref 24.5–35.1)
AST SERPL-CCNC: 20 U/L (ref 0–39)
AST SERPL-CCNC: 20 U/L (ref 0–39)
AST SERPL-CCNC: 21 U/L (ref 0–39)
AST SERPL-CCNC: 21 U/L (ref 0–39)
AST SERPL-CCNC: 22 U/L (ref 0–39)
AST SERPL-CCNC: 25 U/L (ref 0–39)
AST SERPL-CCNC: 27 U/L (ref 0–39)
AST SERPL-CCNC: 27 U/L (ref 0–39)
AST SERPL-CCNC: 30 U/L (ref 0–39)
AST SERPL-CCNC: 43 U/L (ref 0–39)
AST SERPL-CCNC: 47 U/L (ref 0–39)
BACTERIA: ABNORMAL /HPF
BASOPHILS ABSOLUTE: 0.02 E9/L (ref 0–0.2)
BASOPHILS ABSOLUTE: 0.02 E9/L (ref 0–0.2)
BASOPHILS ABSOLUTE: 0.03 E9/L (ref 0–0.2)
BASOPHILS RELATIVE PERCENT: 0.4 % (ref 0–2)
BASOPHILS RELATIVE PERCENT: 0.4 % (ref 0–2)
BASOPHILS RELATIVE PERCENT: 0.5 % (ref 0–2)
BASOPHILS RELATIVE PERCENT: 0.5 % (ref 0–2)
BASOPHILS RELATIVE PERCENT: 0.6 % (ref 0–2)
BILIRUB SERPL-MCNC: 0.3 MG/DL (ref 0–1.2)
BILIRUB SERPL-MCNC: 0.4 MG/DL (ref 0–1.2)
BILIRUB SERPL-MCNC: 0.6 MG/DL (ref 0–1.2)
BILIRUB SERPL-MCNC: 0.6 MG/DL (ref 0–1.2)
BILIRUB SERPL-MCNC: 0.7 MG/DL (ref 0–1.2)
BILIRUBIN URINE: ABNORMAL
BILIRUBIN URINE: NEGATIVE
BLOOD CULTURE, ROUTINE: NORMAL
BLOOD, URINE: ABNORMAL
BLOOD, URINE: NEGATIVE
BLOOD, URINE: NORMAL
BOTTLE TYPE: NORMAL
BUN BLDV-MCNC: 10 MG/DL (ref 8–23)
BUN BLDV-MCNC: 11 MG/DL (ref 8–23)
BUN BLDV-MCNC: 11 MG/DL (ref 8–23)
BUN BLDV-MCNC: 12 MG/DL (ref 8–23)
BUN BLDV-MCNC: 13 MG/DL (ref 8–23)
BUN BLDV-MCNC: 13 MG/DL (ref 8–23)
BUN BLDV-MCNC: 14 MG/DL (ref 8–23)
BUN BLDV-MCNC: 15 MG/DL (ref 8–23)
BUN BLDV-MCNC: 15 MG/DL (ref 8–23)
BUN BLDV-MCNC: 17 MG/DL (ref 8–23)
BUN BLDV-MCNC: 18 MG/DL (ref 8–23)
BUN BLDV-MCNC: 19 MG/DL (ref 8–23)
BUN BLDV-MCNC: 21 MG/DL (ref 8–23)
BUN BLDV-MCNC: 22 MG/DL (ref 8–23)
BUN BLDV-MCNC: 23 MG/DL (ref 8–23)
BUN BLDV-MCNC: 25 MG/DL (ref 8–23)
BUN BLDV-MCNC: 25 MG/DL (ref 8–23)
BUN BLDV-MCNC: 31 MG/DL (ref 8–23)
BUN BLDV-MCNC: 33 MG/DL (ref 8–23)
BUN BLDV-MCNC: 35 MG/DL (ref 8–23)
BUN BLDV-MCNC: 5 MG/DL (ref 8–23)
BUN BLDV-MCNC: 6 MG/DL (ref 8–23)
BUN BLDV-MCNC: 9 MG/DL (ref 8–23)
CALCIUM SERPL-MCNC: 8 MG/DL (ref 8.6–10.2)
CALCIUM SERPL-MCNC: 8.1 MG/DL (ref 8.6–10.2)
CALCIUM SERPL-MCNC: 8.4 MG/DL (ref 8.6–10.2)
CALCIUM SERPL-MCNC: 8.4 MG/DL (ref 8.6–10.2)
CALCIUM SERPL-MCNC: 8.5 MG/DL (ref 8.6–10.2)
CALCIUM SERPL-MCNC: 8.6 MG/DL (ref 8.6–10.2)
CALCIUM SERPL-MCNC: 8.7 MG/DL (ref 8.6–10.2)
CALCIUM SERPL-MCNC: 8.8 MG/DL (ref 8.6–10.2)
CALCIUM SERPL-MCNC: 8.9 MG/DL (ref 8.6–10.2)
CALCIUM SERPL-MCNC: 8.9 MG/DL (ref 8.6–10.2)
CALCIUM SERPL-MCNC: 9.1 MG/DL (ref 8.6–10.2)
CALCIUM SERPL-MCNC: 9.3 MG/DL (ref 8.6–10.2)
CALCIUM SERPL-MCNC: 9.5 MG/DL (ref 8.6–10.2)
CANDIDA ALBICANS BY PCR: NOT DETECTED
CANDIDA GLABRATA BY PCR: NOT DETECTED
CANDIDA KRUSEI BY PCR: NOT DETECTED
CANDIDA PARAPSILOSIS BY PCR: NOT DETECTED
CANDIDA TROPICALIS BY PCR: NOT DETECTED
CASTS: ABNORMAL /LPF
CHLORIDE BLD-SCNC: 101 MMOL/L (ref 98–107)
CHLORIDE BLD-SCNC: 102 MMOL/L (ref 98–107)
CHLORIDE BLD-SCNC: 102 MMOL/L (ref 98–107)
CHLORIDE BLD-SCNC: 104 MMOL/L (ref 98–107)
CHLORIDE BLD-SCNC: 104 MMOL/L (ref 98–107)
CHLORIDE BLD-SCNC: 105 MMOL/L (ref 98–107)
CHLORIDE BLD-SCNC: 105 MMOL/L (ref 98–107)
CHLORIDE BLD-SCNC: 106 MMOL/L (ref 98–107)
CHLORIDE BLD-SCNC: 107 MMOL/L (ref 98–107)
CHLORIDE BLD-SCNC: 107 MMOL/L (ref 98–107)
CHLORIDE BLD-SCNC: 108 MMOL/L (ref 98–107)
CHLORIDE BLD-SCNC: 109 MMOL/L (ref 98–107)
CHLORIDE BLD-SCNC: 112 MMOL/L (ref 98–107)
CHLORIDE BLD-SCNC: 112 MMOL/L (ref 98–107)
CHLORIDE BLD-SCNC: 113 MMOL/L (ref 98–107)
CHLORIDE BLD-SCNC: 114 MMOL/L (ref 98–107)
CHLORIDE BLD-SCNC: 114 MMOL/L (ref 98–107)
CLARITY: ABNORMAL
CLARITY: CLEAR
CO2: 22 MMOL/L (ref 22–29)
CO2: 23 MMOL/L (ref 22–29)
CO2: 24 MMOL/L (ref 22–29)
CO2: 25 MMOL/L (ref 22–29)
CO2: 26 MMOL/L (ref 22–29)
CO2: 27 MMOL/L (ref 22–29)
CO2: 28 MMOL/L (ref 22–29)
CO2: 29 MMOL/L (ref 22–29)
CO2: 30 MMOL/L (ref 22–29)
COLOR: YELLOW
CREAT SERPL-MCNC: 0.7 MG/DL (ref 0.7–1.2)
CREAT SERPL-MCNC: 0.7 MG/DL (ref 0.7–1.2)
CREAT SERPL-MCNC: 0.8 MG/DL (ref 0.7–1.2)
CREAT SERPL-MCNC: 0.9 MG/DL (ref 0.7–1.2)
CREAT SERPL-MCNC: 1 MG/DL (ref 0.7–1.2)
CREAT SERPL-MCNC: 1.1 MG/DL (ref 0.7–1.2)
CREAT SERPL-MCNC: 1.3 MG/DL (ref 0.7–1.2)
CREAT SERPL-MCNC: 1.4 MG/DL (ref 0.7–1.2)
CREAT SERPL-MCNC: 1.4 MG/DL (ref 0.7–1.2)
CREAT SERPL-MCNC: 1.6 MG/DL (ref 0.7–1.2)
CRYSTALS, UA: ABNORMAL
CULTURE, BLOOD 2: NORMAL
CULTURE, BLOOD 2: NORMAL
EKG ATRIAL RATE: 51 BPM
EKG ATRIAL RATE: 56 BPM
EKG ATRIAL RATE: 63 BPM
EKG ATRIAL RATE: 70 BPM
EKG ATRIAL RATE: 71 BPM
EKG ATRIAL RATE: 81 BPM
EKG ATRIAL RATE: 93 BPM
EKG P-R INTERVAL: 118 MS
EKG Q-T INTERVAL: 398 MS
EKG Q-T INTERVAL: 414 MS
EKG Q-T INTERVAL: 422 MS
EKG Q-T INTERVAL: 426 MS
EKG Q-T INTERVAL: 436 MS
EKG QRS DURATION: 110 MS
EKG QRS DURATION: 112 MS
EKG QRS DURATION: 114 MS
EKG QRS DURATION: 114 MS
EKG QRS DURATION: 116 MS
EKG QTC CALCULATION (BAZETT): 396 MS
EKG QTC CALCULATION (BAZETT): 411 MS
EKG QTC CALCULATION (BAZETT): 424 MS
EKG QTC CALCULATION (BAZETT): 442 MS
EKG QTC CALCULATION (BAZETT): 449 MS
EKG QTC CALCULATION (BAZETT): 462 MS
EKG QTC CALCULATION (BAZETT): 470 MS
EKG R AXIS: -32 DEGREES
EKG R AXIS: -40 DEGREES
EKG R AXIS: -47 DEGREES
EKG R AXIS: -47 DEGREES
EKG R AXIS: -53 DEGREES
EKG R AXIS: -54 DEGREES
EKG R AXIS: -57 DEGREES
EKG T AXIS: -58 DEGREES
EKG T AXIS: -65 DEGREES
EKG T AXIS: 1 DEGREES
EKG T AXIS: 17 DEGREES
EKG T AXIS: 49 DEGREES
EKG T AXIS: 56 DEGREES
EKG T AXIS: 67 DEGREES
EKG VENTRICULAR RATE: 55 BPM
EKG VENTRICULAR RATE: 56 BPM
EKG VENTRICULAR RATE: 57 BPM
EKG VENTRICULAR RATE: 66 BPM
EKG VENTRICULAR RATE: 71 BPM
EKG VENTRICULAR RATE: 75 BPM
EKG VENTRICULAR RATE: 84 BPM
ENTEROBACTER CLOACAE COMPLEX BY PCR: NOT DETECTED
ENTEROBACTERALES BY PCR: NOT DETECTED
ENTEROCOCCUS BY PCR: NOT DETECTED
EOSINOPHILS ABSOLUTE: 0.03 E9/L (ref 0.05–0.5)
EOSINOPHILS ABSOLUTE: 0.05 E9/L (ref 0.05–0.5)
EOSINOPHILS ABSOLUTE: 0.08 E9/L (ref 0.05–0.5)
EOSINOPHILS ABSOLUTE: 0.14 E9/L (ref 0.05–0.5)
EOSINOPHILS ABSOLUTE: 0.17 E9/L (ref 0.05–0.5)
EOSINOPHILS RELATIVE PERCENT: 0.6 % (ref 0–6)
EOSINOPHILS RELATIVE PERCENT: 0.6 % (ref 0–6)
EOSINOPHILS RELATIVE PERCENT: 1.3 % (ref 0–6)
EOSINOPHILS RELATIVE PERCENT: 3.2 % (ref 0–6)
EOSINOPHILS RELATIVE PERCENT: 3.6 % (ref 0–6)
ESCHERICHIA COLI BY PCR: NOT DETECTED
FOLATE: 13.6 NG/ML (ref 4.8–24.2)
GFR AFRICAN AMERICAN: 50
GFR AFRICAN AMERICAN: 58
GFR AFRICAN AMERICAN: 58
GFR AFRICAN AMERICAN: >60
GFR NON-AFRICAN AMERICAN: 50 ML/MIN/1.73
GFR NON-AFRICAN AMERICAN: 58 ML/MIN/1.73
GFR NON-AFRICAN AMERICAN: 58 ML/MIN/1.73
GFR NON-AFRICAN AMERICAN: >60 ML/MIN/1.73
GLUCOSE BLD-MCNC: 101 MG/DL (ref 74–99)
GLUCOSE BLD-MCNC: 103 MG/DL (ref 74–99)
GLUCOSE BLD-MCNC: 104 MG/DL (ref 74–99)
GLUCOSE BLD-MCNC: 106 MG/DL (ref 74–99)
GLUCOSE BLD-MCNC: 107 MG/DL (ref 74–99)
GLUCOSE BLD-MCNC: 109 MG/DL (ref 74–99)
GLUCOSE BLD-MCNC: 110 MG/DL (ref 74–99)
GLUCOSE BLD-MCNC: 111 MG/DL (ref 74–99)
GLUCOSE BLD-MCNC: 116 MG/DL (ref 74–99)
GLUCOSE BLD-MCNC: 116 MG/DL (ref 74–99)
GLUCOSE BLD-MCNC: 120 MG/DL (ref 74–99)
GLUCOSE BLD-MCNC: 123 MG/DL (ref 74–99)
GLUCOSE BLD-MCNC: 131 MG/DL (ref 74–99)
GLUCOSE BLD-MCNC: 81 MG/DL (ref 74–99)
GLUCOSE BLD-MCNC: 85 MG/DL (ref 74–99)
GLUCOSE BLD-MCNC: 86 MG/DL (ref 74–99)
GLUCOSE BLD-MCNC: 86 MG/DL (ref 74–99)
GLUCOSE BLD-MCNC: 90 MG/DL (ref 74–99)
GLUCOSE BLD-MCNC: 91 MG/DL (ref 74–99)
GLUCOSE BLD-MCNC: 94 MG/DL (ref 74–99)
GLUCOSE BLD-MCNC: 95 MG/DL (ref 74–99)
GLUCOSE BLD-MCNC: 95 MG/DL (ref 74–99)
GLUCOSE BLD-MCNC: 97 MG/DL (ref 74–99)
GLUCOSE URINE: NEGATIVE MG/DL
HAEMOPHILUS INFLUENZAE BY PCR: NOT DETECTED
HCT VFR BLD CALC: 24.4 % (ref 37–54)
HCT VFR BLD CALC: 25.7 % (ref 37–54)
HCT VFR BLD CALC: 26.1 % (ref 37–54)
HCT VFR BLD CALC: 28.3 % (ref 37–54)
HCT VFR BLD CALC: 28.7 % (ref 37–54)
HCT VFR BLD CALC: 29.5 % (ref 37–54)
HCT VFR BLD CALC: 29.6 % (ref 37–54)
HCT VFR BLD CALC: 29.8 % (ref 37–54)
HCT VFR BLD CALC: 30.6 % (ref 37–54)
HCT VFR BLD CALC: 31.3 % (ref 37–54)
HCT VFR BLD CALC: 32.6 % (ref 37–54)
HCT VFR BLD CALC: 32.8 % (ref 37–54)
HCT VFR BLD CALC: 32.8 % (ref 37–54)
HCT VFR BLD CALC: 33.1 % (ref 37–54)
HCT VFR BLD CALC: 33.5 % (ref 37–54)
HCT VFR BLD CALC: 33.7 % (ref 37–54)
HCT VFR BLD CALC: 34.9 % (ref 37–54)
HCT VFR BLD CALC: 35.4 % (ref 37–54)
HEMOGLOBIN: 10.1 G/DL (ref 12.5–16.5)
HEMOGLOBIN: 10.1 G/DL (ref 12.5–16.5)
HEMOGLOBIN: 10.2 G/DL (ref 12.5–16.5)
HEMOGLOBIN: 10.5 G/DL (ref 12.5–16.5)
HEMOGLOBIN: 10.5 G/DL (ref 12.5–16.5)
HEMOGLOBIN: 10.6 G/DL (ref 12.5–16.5)
HEMOGLOBIN: 10.8 G/DL (ref 12.5–16.5)
HEMOGLOBIN: 7.7 G/DL (ref 12.5–16.5)
HEMOGLOBIN: 8.1 G/DL (ref 12.5–16.5)
HEMOGLOBIN: 8.1 G/DL (ref 12.5–16.5)
HEMOGLOBIN: 8.9 G/DL (ref 12.5–16.5)
HEMOGLOBIN: 9.2 G/DL (ref 12.5–16.5)
HEMOGLOBIN: 9.3 G/DL (ref 12.5–16.5)
HEMOGLOBIN: 9.4 G/DL (ref 12.5–16.5)
HEMOGLOBIN: 9.4 G/DL (ref 12.5–16.5)
HEMOGLOBIN: 9.5 G/DL (ref 12.5–16.5)
HEMOGLOBIN: 9.6 G/DL (ref 12.5–16.5)
HEMOGLOBIN: 9.9 G/DL (ref 12.5–16.5)
IMMATURE GRANULOCYTES #: 0.01 E9/L
IMMATURE GRANULOCYTES #: 0.02 E9/L
IMMATURE GRANULOCYTES #: 0.04 E9/L
IMMATURE GRANULOCYTES %: 0.3 % (ref 0–5)
IMMATURE GRANULOCYTES %: 0.3 % (ref 0–5)
IMMATURE GRANULOCYTES %: 0.4 % (ref 0–5)
IMMATURE GRANULOCYTES %: 0.4 % (ref 0–5)
IMMATURE GRANULOCYTES %: 0.6 % (ref 0–5)
INR BLD: 1.3
KETONES, URINE: 15 MG/DL
KETONES, URINE: ABNORMAL MG/DL
KETONES, URINE: ABNORMAL MG/DL
KETONES, URINE: NEGATIVE MG/DL
KETONES, URINE: NEGATIVE MG/DL
KLEBSIELLA OXYTOCA BY PCR: NOT DETECTED
KLEBSIELLA PNEUMONIAE GROUP BY PCR: NOT DETECTED
LACTIC ACID, SEPSIS: 1.7 MMOL/L (ref 0.5–1.9)
LACTIC ACID: 1.1 MMOL/L (ref 0.5–2.2)
LACTIC ACID: 1.9 MMOL/L (ref 0.5–2.2)
LACTIC ACID: 2.4 MMOL/L (ref 0.5–2.2)
LEUKOCYTE ESTERASE, URINE: ABNORMAL
LEUKOCYTE ESTERASE, URINE: ABNORMAL
LEUKOCYTE ESTERASE, URINE: NEGATIVE
LIPASE: 17 U/L (ref 13–60)
LIPASE: 32 U/L (ref 13–60)
LISTERIA MONOCYTOGENES BY PCR: NOT DETECTED
LV EF: 58 %
LVEF MODALITY: NORMAL
LYMPHOCYTES ABSOLUTE: 0.77 E9/L (ref 1.5–4)
LYMPHOCYTES ABSOLUTE: 0.81 E9/L (ref 1.5–4)
LYMPHOCYTES ABSOLUTE: 0.97 E9/L (ref 1.5–4)
LYMPHOCYTES ABSOLUTE: 0.98 E9/L (ref 1.5–4)
LYMPHOCYTES ABSOLUTE: 0.99 E9/L (ref 1.5–4)
LYMPHOCYTES RELATIVE PERCENT: 12.4 % (ref 20–42)
LYMPHOCYTES RELATIVE PERCENT: 15.5 % (ref 20–42)
LYMPHOCYTES RELATIVE PERCENT: 15.5 % (ref 20–42)
LYMPHOCYTES RELATIVE PERCENT: 18.7 % (ref 20–42)
LYMPHOCYTES RELATIVE PERCENT: 19.8 % (ref 20–42)
MAGNESIUM: 1.8 MG/DL (ref 1.6–2.6)
MAGNESIUM: 1.8 MG/DL (ref 1.6–2.6)
MAGNESIUM: 1.9 MG/DL (ref 1.6–2.6)
MAGNESIUM: 2 MG/DL (ref 1.6–2.6)
MAGNESIUM: 2.1 MG/DL (ref 1.6–2.6)
MAGNESIUM: 2.2 MG/DL (ref 1.6–2.6)
MAGNESIUM: 2.3 MG/DL (ref 1.6–2.6)
MAGNESIUM: 2.4 MG/DL (ref 1.6–2.6)
MCH RBC QN AUTO: 27.9 PG (ref 26–35)
MCH RBC QN AUTO: 28 PG (ref 26–35)
MCH RBC QN AUTO: 28 PG (ref 26–35)
MCH RBC QN AUTO: 28.1 PG (ref 26–35)
MCH RBC QN AUTO: 28.1 PG (ref 26–35)
MCH RBC QN AUTO: 28.3 PG (ref 26–35)
MCH RBC QN AUTO: 28.3 PG (ref 26–35)
MCH RBC QN AUTO: 28.4 PG (ref 26–35)
MCH RBC QN AUTO: 28.6 PG (ref 26–35)
MCH RBC QN AUTO: 28.8 PG (ref 26–35)
MCH RBC QN AUTO: 28.9 PG (ref 26–35)
MCH RBC QN AUTO: 29 PG (ref 26–35)
MCH RBC QN AUTO: 29.1 PG (ref 26–35)
MCH RBC QN AUTO: 29.1 PG (ref 26–35)
MCH RBC QN AUTO: 29.4 PG (ref 26–35)
MCHC RBC AUTO-ENTMCNC: 29.9 % (ref 32–34.5)
MCHC RBC AUTO-ENTMCNC: 30 % (ref 32–34.5)
MCHC RBC AUTO-ENTMCNC: 30.2 % (ref 32–34.5)
MCHC RBC AUTO-ENTMCNC: 30.7 % (ref 32–34.5)
MCHC RBC AUTO-ENTMCNC: 30.8 % (ref 32–34.5)
MCHC RBC AUTO-ENTMCNC: 30.9 % (ref 32–34.5)
MCHC RBC AUTO-ENTMCNC: 31 % (ref 32–34.5)
MCHC RBC AUTO-ENTMCNC: 31 % (ref 32–34.5)
MCHC RBC AUTO-ENTMCNC: 31.3 % (ref 32–34.5)
MCHC RBC AUTO-ENTMCNC: 31.3 % (ref 32–34.5)
MCHC RBC AUTO-ENTMCNC: 31.4 % (ref 32–34.5)
MCHC RBC AUTO-ENTMCNC: 31.5 % (ref 32–34.5)
MCHC RBC AUTO-ENTMCNC: 31.5 % (ref 32–34.5)
MCHC RBC AUTO-ENTMCNC: 31.6 % (ref 32–34.5)
MCHC RBC AUTO-ENTMCNC: 31.7 % (ref 32–34.5)
MCV RBC AUTO: 88.7 FL (ref 80–99.9)
MCV RBC AUTO: 89.9 FL (ref 80–99.9)
MCV RBC AUTO: 90.4 FL (ref 80–99.9)
MCV RBC AUTO: 90.5 FL (ref 80–99.9)
MCV RBC AUTO: 90.6 FL (ref 80–99.9)
MCV RBC AUTO: 92 FL (ref 80–99.9)
MCV RBC AUTO: 92.3 FL (ref 80–99.9)
MCV RBC AUTO: 92.7 FL (ref 80–99.9)
MCV RBC AUTO: 92.9 FL (ref 80–99.9)
MCV RBC AUTO: 93.1 FL (ref 80–99.9)
MCV RBC AUTO: 93.2 FL (ref 80–99.9)
MCV RBC AUTO: 93.4 FL (ref 80–99.9)
MCV RBC AUTO: 93.6 FL (ref 80–99.9)
MCV RBC AUTO: 93.7 FL (ref 80–99.9)
MCV RBC AUTO: 93.7 FL (ref 80–99.9)
MONOCYTES ABSOLUTE: 0.41 E9/L (ref 0.1–0.95)
MONOCYTES ABSOLUTE: 0.42 E9/L (ref 0.1–0.95)
MONOCYTES ABSOLUTE: 0.48 E9/L (ref 0.1–0.95)
MONOCYTES ABSOLUTE: 0.51 E9/L (ref 0.1–0.95)
MONOCYTES ABSOLUTE: 0.54 E9/L (ref 0.1–0.95)
MONOCYTES RELATIVE PERCENT: 10.6 % (ref 2–12)
MONOCYTES RELATIVE PERCENT: 6.8 % (ref 2–12)
MONOCYTES RELATIVE PERCENT: 8 % (ref 2–12)
MONOCYTES RELATIVE PERCENT: 8.2 % (ref 2–12)
MONOCYTES RELATIVE PERCENT: 9.2 % (ref 2–12)
MUCUS: PRESENT
NEISSERIA MENINGITIDIS BY PCR: NOT DETECTED
NEUTROPHILS ABSOLUTE: 2.53 E9/L (ref 1.8–7.3)
NEUTROPHILS ABSOLUTE: 3.63 E9/L (ref 1.8–7.3)
NEUTROPHILS ABSOLUTE: 3.87 E9/L (ref 1.8–7.3)
NEUTROPHILS ABSOLUTE: 4.61 E9/L (ref 1.8–7.3)
NEUTROPHILS ABSOLUTE: 6.35 E9/L (ref 1.8–7.3)
NEUTROPHILS RELATIVE PERCENT: 65.2 % (ref 43–80)
NEUTROPHILS RELATIVE PERCENT: 69.3 % (ref 43–80)
NEUTROPHILS RELATIVE PERCENT: 73.7 % (ref 43–80)
NEUTROPHILS RELATIVE PERCENT: 73.9 % (ref 43–80)
NEUTROPHILS RELATIVE PERCENT: 79.5 % (ref 43–80)
NITRITE, URINE: NEGATIVE
ORDER NUMBER: NORMAL
ORGANISM: ABNORMAL
ORGANISM: ABNORMAL
PDW BLD-RTO: 14.6 FL (ref 11.5–15)
PDW BLD-RTO: 14.8 FL (ref 11.5–15)
PDW BLD-RTO: 14.8 FL (ref 11.5–15)
PDW BLD-RTO: 15 FL (ref 11.5–15)
PDW BLD-RTO: 15.1 FL (ref 11.5–15)
PDW BLD-RTO: 15.1 FL (ref 11.5–15)
PDW BLD-RTO: 15.2 FL (ref 11.5–15)
PDW BLD-RTO: 15.3 FL (ref 11.5–15)
PDW BLD-RTO: 15.3 FL (ref 11.5–15)
PDW BLD-RTO: 15.4 FL (ref 11.5–15)
PDW BLD-RTO: 15.6 FL (ref 11.5–15)
PDW BLD-RTO: 15.7 FL (ref 11.5–15)
PDW BLD-RTO: 16.8 FL (ref 11.5–15)
PH UA: 5 (ref 5–9)
PH UA: 5.5 (ref 5–9)
PH UA: 5.5 (ref 5–9)
PH UA: 6 (ref 5–9)
PH UA: 7 (ref 5–9)
PHOSPHORUS: 1.8 MG/DL (ref 2.5–4.5)
PHOSPHORUS: 2.1 MG/DL (ref 2.5–4.5)
PHOSPHORUS: 2.2 MG/DL (ref 2.5–4.5)
PHOSPHORUS: 2.4 MG/DL (ref 2.5–4.5)
PHOSPHORUS: 2.6 MG/DL (ref 2.5–4.5)
PHOSPHORUS: 4.1 MG/DL (ref 2.5–4.5)
PLATELET # BLD: 151 E9/L (ref 130–450)
PLATELET # BLD: 154 E9/L (ref 130–450)
PLATELET # BLD: 166 E9/L (ref 130–450)
PLATELET # BLD: 173 E9/L (ref 130–450)
PLATELET # BLD: 180 E9/L (ref 130–450)
PLATELET # BLD: 183 E9/L (ref 130–450)
PLATELET # BLD: 210 E9/L (ref 130–450)
PLATELET # BLD: 232 E9/L (ref 130–450)
PLATELET # BLD: 240 E9/L (ref 130–450)
PLATELET # BLD: 247 E9/L (ref 130–450)
PLATELET # BLD: 252 E9/L (ref 130–450)
PLATELET # BLD: 253 E9/L (ref 130–450)
PLATELET # BLD: 260 E9/L (ref 130–450)
PLATELET # BLD: 280 E9/L (ref 130–450)
PLATELET # BLD: 297 E9/L (ref 130–450)
PMV BLD AUTO: 10.1 FL (ref 7–12)
PMV BLD AUTO: 10.1 FL (ref 7–12)
PMV BLD AUTO: 10.2 FL (ref 7–12)
PMV BLD AUTO: 10.2 FL (ref 7–12)
PMV BLD AUTO: 10.5 FL (ref 7–12)
PMV BLD AUTO: 10.6 FL (ref 7–12)
PMV BLD AUTO: 10.7 FL (ref 7–12)
PMV BLD AUTO: 10.7 FL (ref 7–12)
PMV BLD AUTO: 10.8 FL (ref 7–12)
PMV BLD AUTO: 11 FL (ref 7–12)
POTASSIUM REFLEX MAGNESIUM: 3.8 MMOL/L (ref 3.5–5)
POTASSIUM REFLEX MAGNESIUM: 4.1 MMOL/L (ref 3.5–5)
POTASSIUM REFLEX MAGNESIUM: 4.1 MMOL/L (ref 3.5–5)
POTASSIUM SERPL-SCNC: 2.9 MMOL/L (ref 3.5–5)
POTASSIUM SERPL-SCNC: 3.1 MMOL/L (ref 3.5–5)
POTASSIUM SERPL-SCNC: 3.2 MMOL/L (ref 3.5–5)
POTASSIUM SERPL-SCNC: 3.2 MMOL/L (ref 3.5–5)
POTASSIUM SERPL-SCNC: 3.3 MMOL/L (ref 3.5–5)
POTASSIUM SERPL-SCNC: 3.5 MMOL/L (ref 3.5–5)
POTASSIUM SERPL-SCNC: 3.5 MMOL/L (ref 3.5–5)
POTASSIUM SERPL-SCNC: 3.6 MMOL/L (ref 3.5–5)
POTASSIUM SERPL-SCNC: 3.7 MMOL/L (ref 3.5–5)
POTASSIUM SERPL-SCNC: 3.8 MMOL/L (ref 3.5–5)
POTASSIUM SERPL-SCNC: 3.8 MMOL/L (ref 3.5–5)
POTASSIUM SERPL-SCNC: 4.1 MMOL/L (ref 3.5–5)
POTASSIUM SERPL-SCNC: 4.1 MMOL/L (ref 3.5–5)
POTASSIUM SERPL-SCNC: 4.4 MMOL/L (ref 3.5–5)
POTASSIUM SERPL-SCNC: 4.9 MMOL/L (ref 3.5–5)
PRO-BNP: 275 PG/ML (ref 0–450)
PRO-BNP: 429 PG/ML (ref 0–450)
PRO-BNP: 583 PG/ML (ref 0–450)
PROTEIN UA: ABNORMAL MG/DL
PROTEIN UA: NEGATIVE MG/DL
PROTEUS BY PCR: NOT DETECTED
PROTHROMBIN TIME: 14.5 SEC (ref 9.3–12.4)
PSEUDOMONAS AERUGINOSA BY PCR: NOT DETECTED
RBC # BLD: 2.75 E12/L (ref 3.8–5.8)
RBC # BLD: 2.86 E12/L (ref 3.8–5.8)
RBC # BLD: 2.88 E12/L (ref 3.8–5.8)
RBC # BLD: 3.13 E12/L (ref 3.8–5.8)
RBC # BLD: 3.23 E12/L (ref 3.8–5.8)
RBC # BLD: 3.36 E12/L (ref 3.8–5.8)
RBC # BLD: 3.38 E12/L (ref 3.8–5.8)
RBC # BLD: 3.5 E12/L (ref 3.8–5.8)
RBC # BLD: 3.5 E12/L (ref 3.8–5.8)
RBC # BLD: 3.51 E12/L (ref 3.8–5.8)
RBC # BLD: 3.57 E12/L (ref 3.8–5.8)
RBC # BLD: 3.62 E12/L (ref 3.8–5.8)
RBC # BLD: 3.64 E12/L (ref 3.8–5.8)
RBC # BLD: 3.73 E12/L (ref 3.8–5.8)
RBC # BLD: 3.79 E12/L (ref 3.8–5.8)
RBC UA: >20 /HPF (ref 0–2)
RBC UA: ABNORMAL /HPF (ref 0–2)
RBC UA: ABNORMAL /HPF (ref 0–2)
REASON FOR REJECTION: NORMAL
REJECTED TEST: NORMAL
SERRATIA MARCESCENS BY PCR: NOT DETECTED
SODIUM BLD-SCNC: 133 MMOL/L (ref 132–146)
SODIUM BLD-SCNC: 134 MMOL/L (ref 132–146)
SODIUM BLD-SCNC: 139 MMOL/L (ref 132–146)
SODIUM BLD-SCNC: 141 MMOL/L (ref 132–146)
SODIUM BLD-SCNC: 142 MMOL/L (ref 132–146)
SODIUM BLD-SCNC: 143 MMOL/L (ref 132–146)
SODIUM BLD-SCNC: 143 MMOL/L (ref 132–146)
SODIUM BLD-SCNC: 144 MMOL/L (ref 132–146)
SODIUM BLD-SCNC: 145 MMOL/L (ref 132–146)
SODIUM BLD-SCNC: 146 MMOL/L (ref 132–146)
SODIUM BLD-SCNC: 147 MMOL/L (ref 132–146)
SODIUM BLD-SCNC: 148 MMOL/L (ref 132–146)
SODIUM BLD-SCNC: 148 MMOL/L (ref 132–146)
SODIUM BLD-SCNC: 149 MMOL/L (ref 132–146)
SODIUM BLD-SCNC: 151 MMOL/L (ref 132–146)
SODIUM BLD-SCNC: 152 MMOL/L (ref 132–146)
SODIUM BLD-SCNC: 152 MMOL/L (ref 132–146)
SOURCE OF BLOOD CULTURE: NORMAL
SPECIFIC GRAVITY UA: 1.01 (ref 1–1.03)
SPECIFIC GRAVITY UA: 1.02 (ref 1–1.03)
STAPHYLOCOCCUS AUREUS BY PCR: NOT DETECTED
STAPHYLOCOCCUS SPECIES BY PCR: NOT DETECTED
STREPTOCOCCUS AGALACTIAE BY PCR: NOT DETECTED
STREPTOCOCCUS PNEUMONIAE BY PCR: NOT DETECTED
STREPTOCOCCUS PYOGENES  BY PCR: NOT DETECTED
STREPTOCOCCUS SPECIES BY PCR: NOT DETECTED
TOTAL CK: 180 U/L (ref 20–200)
TOTAL PROTEIN: 5.2 G/DL (ref 6.4–8.3)
TOTAL PROTEIN: 5.5 G/DL (ref 6.4–8.3)
TOTAL PROTEIN: 5.7 G/DL (ref 6.4–8.3)
TOTAL PROTEIN: 5.7 G/DL (ref 6.4–8.3)
TOTAL PROTEIN: 5.8 G/DL (ref 6.4–8.3)
TOTAL PROTEIN: 6.1 G/DL (ref 6.4–8.3)
TOTAL PROTEIN: 6.2 G/DL (ref 6.4–8.3)
TOTAL PROTEIN: 6.6 G/DL (ref 6.4–8.3)
TOTAL PROTEIN: 6.6 G/DL (ref 6.4–8.3)
TOTAL PROTEIN: 6.9 G/DL (ref 6.4–8.3)
TOTAL PROTEIN: 7.6 G/DL (ref 6.4–8.3)
TROPONIN: 0.01 NG/ML (ref 0–0.03)
TROPONIN: 0.02 NG/ML (ref 0–0.03)
TROPONIN: 0.03 NG/ML (ref 0–0.03)
TROPONIN: 0.04 NG/ML (ref 0–0.03)
TROPONIN: 0.06 NG/ML (ref 0–0.03)
TROPONIN: <0.01 NG/ML (ref 0–0.03)
URINE CULTURE, ROUTINE: ABNORMAL
URINE CULTURE, ROUTINE: NORMAL
UROBILINOGEN, URINE: 0.2 E.U./DL
UROBILINOGEN, URINE: 1 E.U./DL
UROBILINOGEN, URINE: 1 E.U./DL
VITAMIN B-12: 1527 PG/ML (ref 211–946)
VITAMIN D 25-HYDROXY: 31 NG/ML (ref 30–100)
WBC # BLD: 3.9 E9/L (ref 4.5–11.5)
WBC # BLD: 4.2 E9/L (ref 4.5–11.5)
WBC # BLD: 4.5 E9/L (ref 4.5–11.5)
WBC # BLD: 4.8 E9/L (ref 4.5–11.5)
WBC # BLD: 5.2 E9/L (ref 4.5–11.5)
WBC # BLD: 5.2 E9/L (ref 4.5–11.5)
WBC # BLD: 5.4 E9/L (ref 4.5–11.5)
WBC # BLD: 5.9 E9/L (ref 4.5–11.5)
WBC # BLD: 6 E9/L (ref 4.5–11.5)
WBC # BLD: 6.2 E9/L (ref 4.5–11.5)
WBC # BLD: 6.5 E9/L (ref 4.5–11.5)
WBC # BLD: 6.6 E9/L (ref 4.5–11.5)
WBC # BLD: 6.7 E9/L (ref 4.5–11.5)
WBC # BLD: 6.8 E9/L (ref 4.5–11.5)
WBC # BLD: 8 E9/L (ref 4.5–11.5)
WBC UA: ABNORMAL /HPF (ref 0–5)

## 2019-01-01 PROCEDURE — 93010 ELECTROCARDIOGRAM REPORT: CPT | Performed by: INTERNAL MEDICINE

## 2019-01-01 PROCEDURE — 80048 BASIC METABOLIC PNL TOTAL CA: CPT

## 2019-01-01 PROCEDURE — 36415 COLL VENOUS BLD VENIPUNCTURE: CPT

## 2019-01-01 PROCEDURE — 85027 COMPLETE CBC AUTOMATED: CPT

## 2019-01-01 PROCEDURE — 96372 THER/PROPH/DIAG INJ SC/IM: CPT

## 2019-01-01 PROCEDURE — 99221 1ST HOSP IP/OBS SF/LOW 40: CPT | Performed by: SURGERY

## 2019-01-01 PROCEDURE — 6370000000 HC RX 637 (ALT 250 FOR IP): Performed by: INTERNAL MEDICINE

## 2019-01-01 PROCEDURE — 97535 SELF CARE MNGMENT TRAINING: CPT

## 2019-01-01 PROCEDURE — 1200000000 HC SEMI PRIVATE

## 2019-01-01 PROCEDURE — 93005 ELECTROCARDIOGRAM TRACING: CPT | Performed by: EMERGENCY MEDICINE

## 2019-01-01 PROCEDURE — C9113 INJ PANTOPRAZOLE SODIUM, VIA: HCPCS | Performed by: HOSPITALIST

## 2019-01-01 PROCEDURE — 2580000003 HC RX 258: Performed by: HOSPITALIST

## 2019-01-01 PROCEDURE — 71260 CT THORAX DX C+: CPT

## 2019-01-01 PROCEDURE — 99285 EMERGENCY DEPT VISIT HI MDM: CPT

## 2019-01-01 PROCEDURE — 81001 URINALYSIS AUTO W/SCOPE: CPT

## 2019-01-01 PROCEDURE — 83735 ASSAY OF MAGNESIUM: CPT

## 2019-01-01 PROCEDURE — 96361 HYDRATE IV INFUSION ADD-ON: CPT

## 2019-01-01 PROCEDURE — 6370000000 HC RX 637 (ALT 250 FOR IP): Performed by: HOSPITALIST

## 2019-01-01 PROCEDURE — 6360000002 HC RX W HCPCS: Performed by: HOSPITALIST

## 2019-01-01 PROCEDURE — 74230 X-RAY XM SWLNG FUNCJ C+: CPT

## 2019-01-01 PROCEDURE — 2580000003 HC RX 258: Performed by: INTERNAL MEDICINE

## 2019-01-01 PROCEDURE — 2580000003 HC RX 258: Performed by: RADIOLOGY

## 2019-01-01 PROCEDURE — 85025 COMPLETE CBC W/AUTO DIFF WBC: CPT

## 2019-01-01 PROCEDURE — 94640 AIRWAY INHALATION TREATMENT: CPT

## 2019-01-01 PROCEDURE — 80053 COMPREHEN METABOLIC PANEL: CPT

## 2019-01-01 PROCEDURE — G0378 HOSPITAL OBSERVATION PER HR: HCPCS

## 2019-01-01 PROCEDURE — 2500000003 HC RX 250 WO HCPCS: Performed by: INTERNAL MEDICINE

## 2019-01-01 PROCEDURE — 87088 URINE BACTERIA CULTURE: CPT

## 2019-01-01 PROCEDURE — 84484 ASSAY OF TROPONIN QUANT: CPT

## 2019-01-01 PROCEDURE — 71045 X-RAY EXAM CHEST 1 VIEW: CPT

## 2019-01-01 PROCEDURE — 2580000003 HC RX 258: Performed by: NURSE PRACTITIONER

## 2019-01-01 PROCEDURE — 71046 X-RAY EXAM CHEST 2 VIEWS: CPT

## 2019-01-01 PROCEDURE — 83690 ASSAY OF LIPASE: CPT

## 2019-01-01 PROCEDURE — 6360000002 HC RX W HCPCS: Performed by: INTERNAL MEDICINE

## 2019-01-01 PROCEDURE — 84100 ASSAY OF PHOSPHORUS: CPT

## 2019-01-01 PROCEDURE — 87040 BLOOD CULTURE FOR BACTERIA: CPT

## 2019-01-01 PROCEDURE — 97162 PT EVAL MOD COMPLEX 30 MIN: CPT

## 2019-01-01 PROCEDURE — 70450 CT HEAD/BRAIN W/O DYE: CPT

## 2019-01-01 PROCEDURE — 6360000002 HC RX W HCPCS: Performed by: EMERGENCY MEDICINE

## 2019-01-01 PROCEDURE — 93306 TTE W/DOPPLER COMPLETE: CPT

## 2019-01-01 PROCEDURE — 85730 THROMBOPLASTIN TIME PARTIAL: CPT

## 2019-01-01 PROCEDURE — 83880 ASSAY OF NATRIURETIC PEPTIDE: CPT

## 2019-01-01 PROCEDURE — 2580000003 HC RX 258: Performed by: EMERGENCY MEDICINE

## 2019-01-01 PROCEDURE — 2580000003 HC RX 258: Performed by: PHYSICIAN ASSISTANT

## 2019-01-01 PROCEDURE — 85014 HEMATOCRIT: CPT

## 2019-01-01 PROCEDURE — 02HV33Z INSERTION OF INFUSION DEVICE INTO SUPERIOR VENA CAVA, PERCUTANEOUS APPROACH: ICD-10-PCS | Performed by: SURGERY

## 2019-01-01 PROCEDURE — 81003 URINALYSIS AUTO W/O SCOPE: CPT

## 2019-01-01 PROCEDURE — 6370000000 HC RX 637 (ALT 250 FOR IP): Performed by: NURSE PRACTITIONER

## 2019-01-01 PROCEDURE — 83605 ASSAY OF LACTIC ACID: CPT

## 2019-01-01 PROCEDURE — 6360000004 HC RX CONTRAST MEDICATION: Performed by: RADIOLOGY

## 2019-01-01 PROCEDURE — 94664 DEMO&/EVAL PT USE INHALER: CPT

## 2019-01-01 PROCEDURE — 97161 PT EVAL LOW COMPLEX 20 MIN: CPT | Performed by: PHYSICAL THERAPIST

## 2019-01-01 PROCEDURE — 6370000000 HC RX 637 (ALT 250 FOR IP): Performed by: FAMILY MEDICINE

## 2019-01-01 PROCEDURE — 82306 VITAMIN D 25 HYDROXY: CPT

## 2019-01-01 PROCEDURE — 2580000003 HC RX 258: Performed by: STUDENT IN AN ORGANIZED HEALTH CARE EDUCATION/TRAINING PROGRAM

## 2019-01-01 PROCEDURE — 82746 ASSAY OF FOLIC ACID SERUM: CPT

## 2019-01-01 PROCEDURE — 71275 CT ANGIOGRAPHY CHEST: CPT

## 2019-01-01 PROCEDURE — 72125 CT NECK SPINE W/O DYE: CPT

## 2019-01-01 PROCEDURE — 87186 SC STD MICRODIL/AGAR DIL: CPT

## 2019-01-01 PROCEDURE — 97166 OT EVAL MOD COMPLEX 45 MIN: CPT

## 2019-01-01 PROCEDURE — 92526 ORAL FUNCTION THERAPY: CPT | Performed by: SPEECH-LANGUAGE PATHOLOGIST

## 2019-01-01 PROCEDURE — 87077 CULTURE AEROBIC IDENTIFY: CPT

## 2019-01-01 PROCEDURE — 6360000002 HC RX W HCPCS: Performed by: STUDENT IN AN ORGANIZED HEALTH CARE EDUCATION/TRAINING PROGRAM

## 2019-01-01 PROCEDURE — 2580000003 HC RX 258

## 2019-01-01 PROCEDURE — 99233 SBSQ HOSP IP/OBS HIGH 50: CPT | Performed by: INTERNAL MEDICINE

## 2019-01-01 PROCEDURE — 36592 COLLECT BLOOD FROM PICC: CPT

## 2019-01-01 PROCEDURE — 93970 EXTREMITY STUDY: CPT

## 2019-01-01 PROCEDURE — 97162 PT EVAL MOD COMPLEX 30 MIN: CPT | Performed by: PHYSICAL THERAPIST

## 2019-01-01 PROCEDURE — 6360000002 HC RX W HCPCS

## 2019-01-01 PROCEDURE — 97530 THERAPEUTIC ACTIVITIES: CPT

## 2019-01-01 PROCEDURE — 85018 HEMOGLOBIN: CPT

## 2019-01-01 PROCEDURE — 92610 EVALUATE SWALLOWING FUNCTION: CPT | Performed by: SPEECH-LANGUAGE PATHOLOGIST

## 2019-01-01 PROCEDURE — 72131 CT LUMBAR SPINE W/O DYE: CPT

## 2019-01-01 PROCEDURE — 74176 CT ABD & PELVIS W/O CONTRAST: CPT

## 2019-01-01 PROCEDURE — 2500000003 HC RX 250 WO HCPCS: Performed by: PHYSICIAN ASSISTANT

## 2019-01-01 PROCEDURE — 82550 ASSAY OF CK (CPK): CPT

## 2019-01-01 PROCEDURE — 85610 PROTHROMBIN TIME: CPT

## 2019-01-01 PROCEDURE — 96360 HYDRATION IV INFUSION INIT: CPT

## 2019-01-01 PROCEDURE — 92526 ORAL FUNCTION THERAPY: CPT

## 2019-01-01 PROCEDURE — 97530 THERAPEUTIC ACTIVITIES: CPT | Performed by: PHYSICAL THERAPIST

## 2019-01-01 PROCEDURE — 93005 ELECTROCARDIOGRAM TRACING: CPT | Performed by: PHYSICIAN ASSISTANT

## 2019-01-01 PROCEDURE — 36556 INSERT NON-TUNNEL CV CATH: CPT

## 2019-01-01 PROCEDURE — 92611 MOTION FLUOROSCOPY/SWALLOW: CPT

## 2019-01-01 PROCEDURE — 99232 SBSQ HOSP IP/OBS MODERATE 35: CPT | Performed by: INTERNAL MEDICINE

## 2019-01-01 PROCEDURE — 99222 1ST HOSP IP/OBS MODERATE 55: CPT | Performed by: NURSE PRACTITIONER

## 2019-01-01 PROCEDURE — 82607 VITAMIN B-12: CPT

## 2019-01-01 PROCEDURE — 2140000000 HC CCU INTERMEDIATE R&B

## 2019-01-01 PROCEDURE — 93005 ELECTROCARDIOGRAM TRACING: CPT | Performed by: HOSPITALIST

## 2019-01-01 PROCEDURE — 6360000004 HC RX CONTRAST MEDICATION: Performed by: INTERNAL MEDICINE

## 2019-01-01 PROCEDURE — 92611 MOTION FLUOROSCOPY/SWALLOW: CPT | Performed by: SPEECH-LANGUAGE PATHOLOGIST

## 2019-01-01 PROCEDURE — 99223 1ST HOSP IP/OBS HIGH 75: CPT | Performed by: INTERNAL MEDICINE

## 2019-01-01 PROCEDURE — 93005 ELECTROCARDIOGRAM TRACING: CPT | Performed by: STUDENT IN AN ORGANIZED HEALTH CARE EDUCATION/TRAINING PROGRAM

## 2019-01-01 RX ORDER — HEPARIN SODIUM 1000 [USP'U]/ML
80 INJECTION, SOLUTION INTRAVENOUS; SUBCUTANEOUS PRN
Status: DISCONTINUED | OUTPATIENT
Start: 2019-01-01 | End: 2020-01-01

## 2019-01-01 RX ORDER — HEPARIN SODIUM 10000 [USP'U]/100ML
18 INJECTION, SOLUTION INTRAVENOUS CONTINUOUS
Status: DISCONTINUED | OUTPATIENT
Start: 2019-01-01 | End: 2020-01-01

## 2019-01-01 RX ORDER — SODIUM CHLORIDE 0.9 % (FLUSH) 0.9 %
10 SYRINGE (ML) INJECTION EVERY 12 HOURS SCHEDULED
Status: DISCONTINUED | OUTPATIENT
Start: 2019-01-01 | End: 2019-01-01 | Stop reason: HOSPADM

## 2019-01-01 RX ORDER — POTASSIUM CHLORIDE 20 MEQ/1
20 TABLET, EXTENDED RELEASE ORAL 2 TIMES DAILY
Status: ON HOLD | COMMUNITY
End: 2020-01-01 | Stop reason: HOSPADM

## 2019-01-01 RX ORDER — LACTULOSE 10 G/15ML
30 SOLUTION ORAL
Status: DISCONTINUED | OUTPATIENT
Start: 2019-01-01 | End: 2019-01-01

## 2019-01-01 RX ORDER — HEPARIN SODIUM 1000 [USP'U]/ML
40 INJECTION, SOLUTION INTRAVENOUS; SUBCUTANEOUS PRN
Status: DISCONTINUED | OUTPATIENT
Start: 2019-01-01 | End: 2020-01-01

## 2019-01-01 RX ORDER — ACETAMINOPHEN 325 MG/1
650 TABLET ORAL EVERY 4 HOURS PRN
Status: DISCONTINUED | OUTPATIENT
Start: 2019-01-01 | End: 2019-01-01

## 2019-01-01 RX ORDER — MEMANTINE HYDROCHLORIDE 10 MG/1
10 TABLET ORAL 2 TIMES DAILY
Status: DISCONTINUED | OUTPATIENT
Start: 2019-01-01 | End: 2019-01-01 | Stop reason: HOSPADM

## 2019-01-01 RX ORDER — POTASSIUM CHLORIDE 7.45 MG/ML
10 INJECTION INTRAVENOUS ONCE
Status: COMPLETED | OUTPATIENT
Start: 2019-01-01 | End: 2019-01-01

## 2019-01-01 RX ORDER — SODIUM CHLORIDE 0.9 % (FLUSH) 0.9 %
10 SYRINGE (ML) INJECTION PRN
Status: DISCONTINUED | OUTPATIENT
Start: 2019-01-01 | End: 2020-01-01 | Stop reason: HOSPADM

## 2019-01-01 RX ORDER — 0.9 % SODIUM CHLORIDE 0.9 %
1000 INTRAVENOUS SOLUTION INTRAVENOUS ONCE
Status: COMPLETED | OUTPATIENT
Start: 2019-01-01 | End: 2019-01-01

## 2019-01-01 RX ORDER — ACETAMINOPHEN 160 MG
1 TABLET,DISINTEGRATING ORAL DAILY
COMMUNITY

## 2019-01-01 RX ORDER — POTASSIUM CHLORIDE 20 MEQ/1
20 TABLET, EXTENDED RELEASE ORAL 2 TIMES DAILY
Status: DISCONTINUED | OUTPATIENT
Start: 2019-01-01 | End: 2019-01-01 | Stop reason: HOSPADM

## 2019-01-01 RX ORDER — POTASSIUM CHLORIDE 20 MEQ/1
20 TABLET, EXTENDED RELEASE ORAL ONCE
Status: COMPLETED | OUTPATIENT
Start: 2019-01-01 | End: 2019-01-01

## 2019-01-01 RX ORDER — LINEZOLID 2 MG/ML
600 INJECTION, SOLUTION INTRAVENOUS EVERY 12 HOURS
Status: DISCONTINUED | OUTPATIENT
Start: 2019-01-01 | End: 2019-01-01

## 2019-01-01 RX ORDER — ACETAMINOPHEN 650 MG/1
650 SUPPOSITORY RECTAL EVERY 6 HOURS PRN
Status: DISCONTINUED | OUTPATIENT
Start: 2019-01-01 | End: 2020-01-01

## 2019-01-01 RX ORDER — SODIUM PHOSPHATE, DIBASIC AND SODIUM PHOSPHATE, MONOBASIC 7; 19 G/133ML; G/133ML
1 ENEMA RECTAL
Status: COMPLETED | OUTPATIENT
Start: 2019-01-01 | End: 2019-01-01

## 2019-01-01 RX ORDER — PANTOPRAZOLE SODIUM 40 MG/10ML
40 INJECTION, POWDER, LYOPHILIZED, FOR SOLUTION INTRAVENOUS DAILY
Status: DISCONTINUED | OUTPATIENT
Start: 2019-01-01 | End: 2020-01-01

## 2019-01-01 RX ORDER — IPRATROPIUM BROMIDE AND ALBUTEROL SULFATE 2.5; .5 MG/3ML; MG/3ML
1 SOLUTION RESPIRATORY (INHALATION)
Status: DISCONTINUED | OUTPATIENT
Start: 2019-01-01 | End: 2020-01-01 | Stop reason: HOSPADM

## 2019-01-01 RX ORDER — DONEPEZIL HYDROCHLORIDE 5 MG/1
10 TABLET, FILM COATED ORAL DAILY
Status: DISCONTINUED | OUTPATIENT
Start: 2019-01-01 | End: 2019-01-01 | Stop reason: SDUPTHER

## 2019-01-01 RX ORDER — 0.9 % SODIUM CHLORIDE 0.9 %
500 INTRAVENOUS SOLUTION INTRAVENOUS ONCE
Status: COMPLETED | OUTPATIENT
Start: 2019-01-01 | End: 2019-01-01

## 2019-01-01 RX ORDER — ONDANSETRON 2 MG/ML
4 INJECTION INTRAMUSCULAR; INTRAVENOUS EVERY 6 HOURS PRN
Status: DISCONTINUED | OUTPATIENT
Start: 2019-01-01 | End: 2019-01-01 | Stop reason: HOSPADM

## 2019-01-01 RX ORDER — DONEPEZIL HYDROCHLORIDE 5 MG/1
10 TABLET, FILM COATED ORAL DAILY
Status: DISCONTINUED | OUTPATIENT
Start: 2019-01-01 | End: 2019-01-01 | Stop reason: HOSPADM

## 2019-01-01 RX ORDER — MAGNESIUM SULFATE 1 G/100ML
INJECTION INTRAVENOUS
Status: COMPLETED
Start: 2019-01-01 | End: 2019-01-01

## 2019-01-01 RX ORDER — ACETAMINOPHEN 325 MG/1
650 TABLET ORAL EVERY 4 HOURS PRN
Status: DISCONTINUED | OUTPATIENT
Start: 2019-01-01 | End: 2019-01-01 | Stop reason: HOSPADM

## 2019-01-01 RX ORDER — SODIUM CHLORIDE 9 MG/ML
INJECTION, SOLUTION INTRAVENOUS CONTINUOUS
Status: DISCONTINUED | OUTPATIENT
Start: 2019-01-01 | End: 2019-01-01

## 2019-01-01 RX ORDER — DONEPEZIL HYDROCHLORIDE 5 MG/1
10 TABLET, FILM COATED ORAL NIGHTLY
Status: DISCONTINUED | OUTPATIENT
Start: 2019-01-01 | End: 2019-01-01 | Stop reason: HOSPADM

## 2019-01-01 RX ORDER — MEMANTINE HYDROCHLORIDE 10 MG/1
10 TABLET ORAL 2 TIMES DAILY
Status: ON HOLD | COMMUNITY
End: 2019-01-01

## 2019-01-01 RX ORDER — SODIUM CHLORIDE 9 MG/ML
10 INJECTION INTRAVENOUS DAILY
Status: DISCONTINUED | OUTPATIENT
Start: 2019-01-01 | End: 2020-01-01

## 2019-01-01 RX ORDER — ASPIRIN 81 MG/1
81 TABLET ORAL DAILY
Status: DISCONTINUED | OUTPATIENT
Start: 2019-01-01 | End: 2019-01-01

## 2019-01-01 RX ORDER — SENNA PLUS 8.6 MG/1
2 TABLET ORAL 2 TIMES DAILY
Status: DISCONTINUED | OUTPATIENT
Start: 2019-01-01 | End: 2019-01-01

## 2019-01-01 RX ORDER — TROSPIUM CHLORIDE 20 MG/1
20 TABLET, FILM COATED ORAL
Status: DISCONTINUED | OUTPATIENT
Start: 2019-01-01 | End: 2019-01-01 | Stop reason: HOSPADM

## 2019-01-01 RX ORDER — FUROSEMIDE 20 MG/1
20 TABLET ORAL 2 TIMES DAILY
Status: DISCONTINUED | OUTPATIENT
Start: 2019-01-01 | End: 2019-01-01 | Stop reason: HOSPADM

## 2019-01-01 RX ORDER — DONEPEZIL HYDROCHLORIDE 5 MG/1
10 TABLET, FILM COATED ORAL NIGHTLY
Status: DISCONTINUED | OUTPATIENT
Start: 2019-01-01 | End: 2019-01-01

## 2019-01-01 RX ORDER — SODIUM CHLORIDE 0.9 % (FLUSH) 0.9 %
SYRINGE (ML) INJECTION
Status: COMPLETED
Start: 2019-01-01 | End: 2019-01-01

## 2019-01-01 RX ORDER — OXYBUTYNIN CHLORIDE 10 MG/1
10 TABLET, EXTENDED RELEASE ORAL DAILY
Status: DISCONTINUED | OUTPATIENT
Start: 2019-01-01 | End: 2019-01-01 | Stop reason: HOSPADM

## 2019-01-01 RX ORDER — ASPIRIN 300 MG/1
300 SUPPOSITORY RECTAL DAILY
Status: DISCONTINUED | OUTPATIENT
Start: 2019-01-01 | End: 2020-01-01

## 2019-01-01 RX ORDER — CEFAZOLIN SODIUM 2 G/50ML
2 SOLUTION INTRAVENOUS SEE ADMIN INSTRUCTIONS
Status: DISCONTINUED | OUTPATIENT
Start: 2019-01-01 | End: 2020-01-01

## 2019-01-01 RX ORDER — DONEPEZIL HYDROCHLORIDE 10 MG/1
10 TABLET, FILM COATED ORAL DAILY
Status: ON HOLD | COMMUNITY
End: 2019-01-01

## 2019-01-01 RX ORDER — AMLODIPINE BESYLATE 2.5 MG/1
2.5 TABLET ORAL DAILY
Status: DISCONTINUED | OUTPATIENT
Start: 2019-01-01 | End: 2019-01-01 | Stop reason: HOSPADM

## 2019-01-01 RX ORDER — PANTOPRAZOLE SODIUM 40 MG/1
40 TABLET, DELAYED RELEASE ORAL
Qty: 30 TABLET | Refills: 2 | Status: SHIPPED | OUTPATIENT
Start: 2019-01-01 | End: 2019-01-01 | Stop reason: SDUPTHER

## 2019-01-01 RX ORDER — SODIUM CHLORIDE 9 MG/ML
INJECTION, SOLUTION INTRAVENOUS CONTINUOUS
Status: DISCONTINUED | OUTPATIENT
Start: 2019-01-01 | End: 2019-01-01 | Stop reason: HOSPADM

## 2019-01-01 RX ORDER — SODIUM CHLORIDE 0.9 % (FLUSH) 0.9 %
10 SYRINGE (ML) INJECTION
Status: COMPLETED | OUTPATIENT
Start: 2019-01-01 | End: 2019-01-01

## 2019-01-01 RX ORDER — TRAMADOL HYDROCHLORIDE 50 MG/1
100 TABLET ORAL EVERY 6 HOURS PRN
Status: DISCONTINUED | OUTPATIENT
Start: 2019-01-01 | End: 2019-01-01

## 2019-01-01 RX ORDER — ASPIRIN 81 MG/1
81 TABLET ORAL DAILY
Status: DISCONTINUED | OUTPATIENT
Start: 2019-01-01 | End: 2019-01-01 | Stop reason: HOSPADM

## 2019-01-01 RX ORDER — PANTOPRAZOLE SODIUM 40 MG/1
40 TABLET, DELAYED RELEASE ORAL
Status: DISCONTINUED | OUTPATIENT
Start: 2019-01-01 | End: 2019-01-01

## 2019-01-01 RX ORDER — FUROSEMIDE 20 MG/1
20 TABLET ORAL 2 TIMES DAILY
Status: ON HOLD | COMMUNITY
End: 2020-01-01 | Stop reason: HOSPADM

## 2019-01-01 RX ORDER — MELOXICAM 7.5 MG/1
7.5 TABLET ORAL DAILY
Status: DISCONTINUED | OUTPATIENT
Start: 2019-01-01 | End: 2019-01-01

## 2019-01-01 RX ORDER — TRAMADOL HYDROCHLORIDE 50 MG/1
50 TABLET ORAL EVERY 6 HOURS PRN
Status: DISCONTINUED | OUTPATIENT
Start: 2019-01-01 | End: 2019-01-01

## 2019-01-01 RX ORDER — TROSPIUM CHLORIDE 20 MG/1
20 TABLET, FILM COATED ORAL NIGHTLY
COMMUNITY

## 2019-01-01 RX ORDER — MEMANTINE HYDROCHLORIDE 10 MG/1
10 TABLET ORAL 2 TIMES DAILY
Status: DISCONTINUED | OUTPATIENT
Start: 2019-01-01 | End: 2019-01-01 | Stop reason: SDUPTHER

## 2019-01-01 RX ORDER — MEMANTINE HYDROCHLORIDE 10 MG/1
10 TABLET ORAL 2 TIMES DAILY
Status: DISCONTINUED | OUTPATIENT
Start: 2019-01-01 | End: 2019-01-01

## 2019-01-01 RX ORDER — NAPROXEN 500 MG/1
500 TABLET ORAL 2 TIMES DAILY WITH MEALS
Qty: 20 TABLET | Refills: 0 | Status: ON HOLD | OUTPATIENT
Start: 2019-01-01 | End: 2019-01-01

## 2019-01-01 RX ORDER — DEXTROSE, SODIUM CHLORIDE, AND POTASSIUM CHLORIDE 5; .45; .15 G/100ML; G/100ML; G/100ML
INJECTION INTRAVENOUS CONTINUOUS
Status: DISCONTINUED | OUTPATIENT
Start: 2019-01-01 | End: 2019-01-01

## 2019-01-01 RX ORDER — DEXTROSE, SODIUM CHLORIDE, AND POTASSIUM CHLORIDE 5; .9; .15 G/100ML; G/100ML; G/100ML
INJECTION INTRAVENOUS CONTINUOUS
Status: DISCONTINUED | OUTPATIENT
Start: 2019-01-01 | End: 2020-01-01

## 2019-01-01 RX ORDER — DOCUSATE SODIUM 100 MG/1
100 CAPSULE, LIQUID FILLED ORAL NIGHTLY
Status: DISCONTINUED | OUTPATIENT
Start: 2019-01-01 | End: 2019-01-01 | Stop reason: HOSPADM

## 2019-01-01 RX ORDER — SODIUM CHLORIDE 0.9 % (FLUSH) 0.9 %
10 SYRINGE (ML) INJECTION PRN
Status: DISCONTINUED | OUTPATIENT
Start: 2019-01-01 | End: 2019-01-01 | Stop reason: HOSPADM

## 2019-01-01 RX ORDER — FUROSEMIDE 20 MG/1
20 TABLET ORAL DAILY
Status: ON HOLD | COMMUNITY
End: 2019-01-01 | Stop reason: HOSPADM

## 2019-01-01 RX ORDER — TRAMADOL HYDROCHLORIDE 50 MG/1
25 TABLET ORAL EVERY 6 HOURS PRN
Status: DISCONTINUED | OUTPATIENT
Start: 2019-01-01 | End: 2019-01-01 | Stop reason: HOSPADM

## 2019-01-01 RX ORDER — MAGNESIUM SULFATE 1 G/100ML
1 INJECTION INTRAVENOUS ONCE
Status: DISCONTINUED | OUTPATIENT
Start: 2019-01-01 | End: 2019-01-01 | Stop reason: SDUPTHER

## 2019-01-01 RX ORDER — POTASSIUM CHLORIDE 29.8 MG/ML
20 INJECTION INTRAVENOUS ONCE
Status: COMPLETED | OUTPATIENT
Start: 2019-01-01 | End: 2019-01-01

## 2019-01-01 RX ORDER — POTASSIUM CHLORIDE 7.45 MG/ML
10 INJECTION INTRAVENOUS
Status: COMPLETED | OUTPATIENT
Start: 2019-01-01 | End: 2019-01-01

## 2019-01-01 RX ORDER — ASPIRIN 81 MG/1
81 TABLET, CHEWABLE ORAL DAILY
Status: DISCONTINUED | OUTPATIENT
Start: 2019-01-01 | End: 2019-01-01 | Stop reason: HOSPADM

## 2019-01-01 RX ORDER — POTASSIUM CHLORIDE 20 MEQ/1
20 TABLET, EXTENDED RELEASE ORAL DAILY
Status: ON HOLD | COMMUNITY
End: 2019-01-01 | Stop reason: HOSPADM

## 2019-01-01 RX ORDER — PANTOPRAZOLE SODIUM 40 MG/1
40 TABLET, DELAYED RELEASE ORAL
Status: DISCONTINUED | OUTPATIENT
Start: 2019-01-01 | End: 2019-01-01 | Stop reason: HOSPADM

## 2019-01-01 RX ORDER — POTASSIUM CHLORIDE 20 MEQ/1
20 TABLET, EXTENDED RELEASE ORAL 2 TIMES DAILY
Status: DISCONTINUED | OUTPATIENT
Start: 2019-01-01 | End: 2019-01-01

## 2019-01-01 RX ORDER — MAGNESIUM SULFATE 1 G/100ML
1 INJECTION INTRAVENOUS ONCE
Status: COMPLETED | OUTPATIENT
Start: 2019-01-01 | End: 2019-01-01

## 2019-01-01 RX ADMIN — DOCUSATE SODIUM 100 MG: 100 CAPSULE, LIQUID FILLED ORAL at 22:27

## 2019-01-01 RX ADMIN — PANTOPRAZOLE SODIUM 40 MG: 40 INJECTION, POWDER, FOR SOLUTION INTRAVENOUS at 09:49

## 2019-01-01 RX ADMIN — DOCUSATE SODIUM 100 MG: 100 CAPSULE, LIQUID FILLED ORAL at 08:10

## 2019-01-01 RX ADMIN — DONEPEZIL HYDROCHLORIDE 10 MG: 5 TABLET, FILM COATED ORAL at 08:48

## 2019-01-01 RX ADMIN — POTASSIUM CHLORIDE 10 MEQ: 7.46 INJECTION, SOLUTION INTRAVENOUS at 21:55

## 2019-01-01 RX ADMIN — PANTOPRAZOLE SODIUM 40 MG: 40 INJECTION, POWDER, FOR SOLUTION INTRAVENOUS at 08:29

## 2019-01-01 RX ADMIN — ACETAMINOPHEN 650 MG: 325 TABLET, FILM COATED ORAL at 09:55

## 2019-01-01 RX ADMIN — HEPARIN SODIUM 12 UNITS/KG/HR: 10000 INJECTION, SOLUTION INTRAVENOUS at 18:24

## 2019-01-01 RX ADMIN — POTASSIUM PHOSPHATE, MONOBASIC AND POTASSIUM PHOSPHATE, DIBASIC 10 MMOL: 224; 236 INJECTION, SOLUTION, CONCENTRATE INTRAVENOUS at 16:20

## 2019-01-01 RX ADMIN — HEPARIN SODIUM 10 UNITS/KG/HR: 10000 INJECTION, SOLUTION INTRAVENOUS at 05:21

## 2019-01-01 RX ADMIN — OXYBUTYNIN CHLORIDE 10 MG: 10 TABLET, EXTENDED RELEASE ORAL at 08:02

## 2019-01-01 RX ADMIN — DEXTROSE, SODIUM CHLORIDE, AND POTASSIUM CHLORIDE: 5; .9; .15 INJECTION INTRAVENOUS at 13:57

## 2019-01-01 RX ADMIN — WATER 1 G: 1 INJECTION INTRAMUSCULAR; INTRAVENOUS; SUBCUTANEOUS at 09:45

## 2019-01-01 RX ADMIN — HYDROMORPHONE HYDROCHLORIDE 0.2 MG: 1 INJECTION, SOLUTION INTRAMUSCULAR; INTRAVENOUS; SUBCUTANEOUS at 23:21

## 2019-01-01 RX ADMIN — IPRATROPIUM BROMIDE AND ALBUTEROL SULFATE 1 AMPULE: 2.5; .5 SOLUTION RESPIRATORY (INHALATION) at 20:51

## 2019-01-01 RX ADMIN — DEXTROSE, SODIUM CHLORIDE, AND POTASSIUM CHLORIDE: 5; .9; .15 INJECTION INTRAVENOUS at 02:01

## 2019-01-01 RX ADMIN — PERFLUTREN 1.65 MG: 6.52 INJECTION, SUSPENSION INTRAVENOUS at 09:50

## 2019-01-01 RX ADMIN — Medication 10 ML: at 10:19

## 2019-01-01 RX ADMIN — MEMANTINE HYDROCHLORIDE 10 MG: 10 TABLET, FILM COATED ORAL at 08:49

## 2019-01-01 RX ADMIN — WATER 1 G: 1 INJECTION INTRAMUSCULAR; INTRAVENOUS; SUBCUTANEOUS at 08:56

## 2019-01-01 RX ADMIN — MEMANTINE HYDROCHLORIDE 10 MG: 10 TABLET, FILM COATED ORAL at 22:26

## 2019-01-01 RX ADMIN — IPRATROPIUM BROMIDE AND ALBUTEROL SULFATE 1 AMPULE: 2.5; .5 SOLUTION RESPIRATORY (INHALATION) at 21:31

## 2019-01-01 RX ADMIN — POTASSIUM CHLORIDE: 2 INJECTION, SOLUTION, CONCENTRATE INTRAVENOUS at 08:29

## 2019-01-01 RX ADMIN — IPRATROPIUM BROMIDE AND ALBUTEROL SULFATE 1 AMPULE: 2.5; .5 SOLUTION RESPIRATORY (INHALATION) at 16:50

## 2019-01-01 RX ADMIN — POTASSIUM CHLORIDE 20 MEQ: 20 TABLET, EXTENDED RELEASE ORAL at 10:06

## 2019-01-01 RX ADMIN — LINEZOLID 600 MG: 600 INJECTION, SOLUTION INTRAVENOUS at 14:38

## 2019-01-01 RX ADMIN — SODIUM PHOSPHATE, DIBASIC AND SODIUM PHOSPHATE, MONOBASIC 1 ENEMA: 7; 19 ENEMA RECTAL at 09:46

## 2019-01-01 RX ADMIN — FUROSEMIDE 20 MG: 20 TABLET ORAL at 17:53

## 2019-01-01 RX ADMIN — Medication 10 ML: at 08:49

## 2019-01-01 RX ADMIN — MAGNESIUM SULFATE 1 G: 1 INJECTION INTRAVENOUS at 11:13

## 2019-01-01 RX ADMIN — TROSPIUM CHLORIDE 20 MG: 20 TABLET, FILM COATED ORAL at 17:18

## 2019-01-01 RX ADMIN — APIXABAN 10 MG: 5 TABLET, FILM COATED ORAL at 12:55

## 2019-01-01 RX ADMIN — ASPIRIN 81 MG 81 MG: 81 TABLET ORAL at 10:05

## 2019-01-01 RX ADMIN — HYDROMORPHONE HYDROCHLORIDE 0.2 MG: 1 INJECTION, SOLUTION INTRAMUSCULAR; INTRAVENOUS; SUBCUTANEOUS at 20:01

## 2019-01-01 RX ADMIN — IPRATROPIUM BROMIDE AND ALBUTEROL SULFATE 1 AMPULE: 2.5; .5 SOLUTION RESPIRATORY (INHALATION) at 08:03

## 2019-01-01 RX ADMIN — MEMANTINE HYDROCHLORIDE 10 MG: 10 TABLET, FILM COATED ORAL at 08:07

## 2019-01-01 RX ADMIN — IPRATROPIUM BROMIDE AND ALBUTEROL SULFATE 1 AMPULE: 2.5; .5 SOLUTION RESPIRATORY (INHALATION) at 10:10

## 2019-01-01 RX ADMIN — APIXABAN 10 MG: 5 TABLET, FILM COATED ORAL at 09:22

## 2019-01-01 RX ADMIN — SODIUM CHLORIDE, PRESERVATIVE FREE 10 ML: 5 INJECTION INTRAVENOUS at 10:35

## 2019-01-01 RX ADMIN — TROSPIUM CHLORIDE 20 MG: 20 TABLET, FILM COATED ORAL at 18:10

## 2019-01-01 RX ADMIN — POTASSIUM CHLORIDE 20 MEQ: 29.8 INJECTION, SOLUTION INTRAVENOUS at 08:02

## 2019-01-01 RX ADMIN — AMLODIPINE BESYLATE 2.5 MG: 2.5 TABLET ORAL at 10:19

## 2019-01-01 RX ADMIN — TROSPIUM CHLORIDE 20 MG: 20 TABLET, FILM COATED ORAL at 17:48

## 2019-01-01 RX ADMIN — BARIUM SULFATE 15 ML: 0.81 POWDER, FOR SUSPENSION ORAL at 14:17

## 2019-01-01 RX ADMIN — HEPARIN SODIUM 9.96 UNITS/KG/HR: 10000 INJECTION, SOLUTION INTRAVENOUS at 12:40

## 2019-01-01 RX ADMIN — SODIUM CHLORIDE: 9 INJECTION, SOLUTION INTRAVENOUS at 18:40

## 2019-01-01 RX ADMIN — DEXTROSE, SODIUM CHLORIDE, AND POTASSIUM CHLORIDE: 5; .9; .15 INJECTION INTRAVENOUS at 14:38

## 2019-01-01 RX ADMIN — MEMANTINE HYDROCHLORIDE 10 MG: 10 TABLET, FILM COATED ORAL at 08:02

## 2019-01-01 RX ADMIN — IPRATROPIUM BROMIDE AND ALBUTEROL SULFATE 1 AMPULE: 2.5; .5 SOLUTION RESPIRATORY (INHALATION) at 12:11

## 2019-01-01 RX ADMIN — IPRATROPIUM BROMIDE AND ALBUTEROL SULFATE 1 AMPULE: 2.5; .5 SOLUTION RESPIRATORY (INHALATION) at 16:30

## 2019-01-01 RX ADMIN — IPRATROPIUM BROMIDE AND ALBUTEROL SULFATE 1 AMPULE: 2.5; .5 SOLUTION RESPIRATORY (INHALATION) at 16:53

## 2019-01-01 RX ADMIN — Medication 10 ML: at 10:07

## 2019-01-01 RX ADMIN — ENOXAPARIN SODIUM 40 MG: 40 INJECTION SUBCUTANEOUS at 08:02

## 2019-01-01 RX ADMIN — IPRATROPIUM BROMIDE AND ALBUTEROL SULFATE 1 AMPULE: 2.5; .5 SOLUTION RESPIRATORY (INHALATION) at 09:29

## 2019-01-01 RX ADMIN — PANTOPRAZOLE SODIUM 40 MG: 40 TABLET, DELAYED RELEASE ORAL at 06:02

## 2019-01-01 RX ADMIN — IPRATROPIUM BROMIDE AND ALBUTEROL SULFATE 1 AMPULE: 2.5; .5 SOLUTION RESPIRATORY (INHALATION) at 08:22

## 2019-01-01 RX ADMIN — POTASSIUM PHOSPHATE, MONOBASIC AND POTASSIUM PHOSPHATE, DIBASIC 10 MMOL: 224; 236 INJECTION, SOLUTION, CONCENTRATE INTRAVENOUS at 10:01

## 2019-01-01 RX ADMIN — SODIUM CHLORIDE, PRESERVATIVE FREE 10 ML: 5 INJECTION INTRAVENOUS at 08:03

## 2019-01-01 RX ADMIN — ASPIRIN 81 MG: 81 TABLET, COATED ORAL at 10:38

## 2019-01-01 RX ADMIN — LINEZOLID 600 MG: 600 INJECTION, SOLUTION INTRAVENOUS at 14:04

## 2019-01-01 RX ADMIN — POTASSIUM CHLORIDE 20 MEQ: 20 TABLET, EXTENDED RELEASE ORAL at 08:02

## 2019-01-01 RX ADMIN — SODIUM CHLORIDE 1000 ML: 9 INJECTION, SOLUTION INTRAVENOUS at 17:20

## 2019-01-01 RX ADMIN — IPRATROPIUM BROMIDE AND ALBUTEROL SULFATE 1 AMPULE: 2.5; .5 SOLUTION RESPIRATORY (INHALATION) at 16:32

## 2019-01-01 RX ADMIN — FUROSEMIDE 20 MG: 20 TABLET ORAL at 10:06

## 2019-01-01 RX ADMIN — HYDROMORPHONE HYDROCHLORIDE 0.2 MG: 1 INJECTION, SOLUTION INTRAMUSCULAR; INTRAVENOUS; SUBCUTANEOUS at 20:43

## 2019-01-01 RX ADMIN — ENOXAPARIN SODIUM 100 MG: 100 INJECTION SUBCUTANEOUS at 22:11

## 2019-01-01 RX ADMIN — MEMANTINE HYDROCHLORIDE 10 MG: 10 TABLET, FILM COATED ORAL at 11:47

## 2019-01-01 RX ADMIN — Medication 10 ML: at 22:10

## 2019-01-01 RX ADMIN — Medication 1000 ML: at 12:53

## 2019-01-01 RX ADMIN — TROSPIUM CHLORIDE 20 MG: 20 TABLET, FILM COATED ORAL at 06:24

## 2019-01-01 RX ADMIN — DONEPEZIL HYDROCHLORIDE 10 MG: 5 TABLET, FILM COATED ORAL at 20:38

## 2019-01-01 RX ADMIN — ENOXAPARIN SODIUM 40 MG: 40 INJECTION SUBCUTANEOUS at 10:19

## 2019-01-01 RX ADMIN — MEMANTINE HYDROCHLORIDE 10 MG: 10 TABLET, FILM COATED ORAL at 22:47

## 2019-01-01 RX ADMIN — POTASSIUM CHLORIDE: 2 INJECTION, SOLUTION, CONCENTRATE INTRAVENOUS at 06:18

## 2019-01-01 RX ADMIN — MEMANTINE HYDROCHLORIDE 10 MG: 10 TABLET, FILM COATED ORAL at 09:22

## 2019-01-01 RX ADMIN — OXYBUTYNIN CHLORIDE 10 MG: 10 TABLET, EXTENDED RELEASE ORAL at 10:19

## 2019-01-01 RX ADMIN — HYDROMORPHONE HYDROCHLORIDE 0.2 MG: 1 INJECTION, SOLUTION INTRAMUSCULAR; INTRAVENOUS; SUBCUTANEOUS at 18:37

## 2019-01-01 RX ADMIN — ASPIRIN 81 MG: 81 TABLET, COATED ORAL at 09:22

## 2019-01-01 RX ADMIN — LINEZOLID 600 MG: 600 INJECTION, SOLUTION INTRAVENOUS at 02:12

## 2019-01-01 RX ADMIN — FUROSEMIDE 20 MG: 20 TABLET ORAL at 08:02

## 2019-01-01 RX ADMIN — APIXABAN 10 MG: 5 TABLET, FILM COATED ORAL at 09:25

## 2019-01-01 RX ADMIN — ASPIRIN 81 MG 81 MG: 81 TABLET ORAL at 10:19

## 2019-01-01 RX ADMIN — LINEZOLID 600 MG: 600 INJECTION, SOLUTION INTRAVENOUS at 02:01

## 2019-01-01 RX ADMIN — IPRATROPIUM BROMIDE AND ALBUTEROL SULFATE 1 AMPULE: 2.5; .5 SOLUTION RESPIRATORY (INHALATION) at 20:09

## 2019-01-01 RX ADMIN — SODIUM CHLORIDE, PRESERVATIVE FREE 10 ML: 5 INJECTION INTRAVENOUS at 09:41

## 2019-01-01 RX ADMIN — ACETAMINOPHEN 650 MG: 325 TABLET, FILM COATED ORAL at 17:56

## 2019-01-01 RX ADMIN — POTASSIUM CHLORIDE 10 MEQ: 7.46 INJECTION, SOLUTION INTRAVENOUS at 22:53

## 2019-01-01 RX ADMIN — TROSPIUM CHLORIDE 20 MG: 20 TABLET, FILM COATED ORAL at 16:07

## 2019-01-01 RX ADMIN — POTASSIUM CHLORIDE: 2 INJECTION, SOLUTION, CONCENTRATE INTRAVENOUS at 21:24

## 2019-01-01 RX ADMIN — DONEPEZIL HYDROCHLORIDE 10 MG: 5 TABLET, FILM COATED ORAL at 20:53

## 2019-01-01 RX ADMIN — OXYBUTYNIN CHLORIDE 10 MG: 10 TABLET, EXTENDED RELEASE ORAL at 11:47

## 2019-01-01 RX ADMIN — HYDROMORPHONE HYDROCHLORIDE 0.2 MG: 1 INJECTION, SOLUTION INTRAMUSCULAR; INTRAVENOUS; SUBCUTANEOUS at 10:01

## 2019-01-01 RX ADMIN — HEPARIN SODIUM 15 UNITS/KG/HR: 10000 INJECTION, SOLUTION INTRAVENOUS at 08:11

## 2019-01-01 RX ADMIN — SODIUM CHLORIDE: 9 INJECTION, SOLUTION INTRAVENOUS at 22:41

## 2019-01-01 RX ADMIN — BARIUM SULFATE 15 ML: 400 SUSPENSION ORAL at 14:17

## 2019-01-01 RX ADMIN — ENOXAPARIN SODIUM 100 MG: 100 INJECTION SUBCUTANEOUS at 09:55

## 2019-01-01 RX ADMIN — MEMANTINE HYDROCHLORIDE 10 MG: 10 TABLET, FILM COATED ORAL at 21:49

## 2019-01-01 RX ADMIN — HEPARIN SODIUM 15 UNITS/KG/HR: 10000 INJECTION, SOLUTION INTRAVENOUS at 06:48

## 2019-01-01 RX ADMIN — MEMANTINE HYDROCHLORIDE 10 MG: 10 TABLET, FILM COATED ORAL at 10:37

## 2019-01-01 RX ADMIN — PANTOPRAZOLE SODIUM 40 MG: 40 INJECTION, POWDER, FOR SOLUTION INTRAVENOUS at 09:41

## 2019-01-01 RX ADMIN — DONEPEZIL HYDROCHLORIDE 10 MG: 5 TABLET, FILM COATED ORAL at 20:13

## 2019-01-01 RX ADMIN — ASPIRIN 300 MG: 300 SUPPOSITORY RECTAL at 09:41

## 2019-01-01 RX ADMIN — BARIUM SULFATE 15 ML: 400 SUSPENSION ORAL at 13:15

## 2019-01-01 RX ADMIN — ASPIRIN 81 MG: 81 TABLET, COATED ORAL at 09:25

## 2019-01-01 RX ADMIN — SODIUM CHLORIDE, PRESERVATIVE FREE 10 ML: 5 INJECTION INTRAVENOUS at 16:17

## 2019-01-01 RX ADMIN — DONEPEZIL HYDROCHLORIDE 10 MG: 5 TABLET, FILM COATED ORAL at 21:01

## 2019-01-01 RX ADMIN — APIXABAN 10 MG: 5 TABLET, FILM COATED ORAL at 20:38

## 2019-01-01 RX ADMIN — SODIUM CHLORIDE 500 ML: 9 INJECTION, SOLUTION INTRAVENOUS at 12:51

## 2019-01-01 RX ADMIN — Medication 10 ML: at 08:02

## 2019-01-01 RX ADMIN — DONEPEZIL HYDROCHLORIDE 10 MG: 5 TABLET, FILM COATED ORAL at 22:47

## 2019-01-01 RX ADMIN — PANTOPRAZOLE SODIUM 40 MG: 40 TABLET, DELAYED RELEASE ORAL at 06:43

## 2019-01-01 RX ADMIN — PANTOPRAZOLE SODIUM 40 MG: 40 INJECTION, POWDER, FOR SOLUTION INTRAVENOUS at 08:30

## 2019-01-01 RX ADMIN — FUROSEMIDE 20 MG: 20 TABLET ORAL at 21:01

## 2019-01-01 RX ADMIN — AMPICILLIN AND SULBACTAM 3 G: 1; 2 INJECTION, POWDER, FOR SOLUTION INTRAMUSCULAR; INTRAVENOUS at 02:50

## 2019-01-01 RX ADMIN — LINEZOLID 600 MG: 600 INJECTION, SOLUTION INTRAVENOUS at 15:31

## 2019-01-01 RX ADMIN — FUROSEMIDE 20 MG: 20 TABLET ORAL at 10:19

## 2019-01-01 RX ADMIN — ACETAMINOPHEN 650 MG: 325 TABLET, FILM COATED ORAL at 11:57

## 2019-01-01 RX ADMIN — ASPIRIN 81 MG: 81 TABLET, COATED ORAL at 08:08

## 2019-01-01 RX ADMIN — TRAMADOL HYDROCHLORIDE 50 MG: 50 TABLET, FILM COATED ORAL at 14:35

## 2019-01-01 RX ADMIN — APIXABAN 5 MG: 5 TABLET, FILM COATED ORAL at 12:31

## 2019-01-01 RX ADMIN — DEXTROSE, SODIUM CHLORIDE, AND POTASSIUM CHLORIDE: 5; .45; .15 INJECTION INTRAVENOUS at 23:59

## 2019-01-01 RX ADMIN — IPRATROPIUM BROMIDE AND ALBUTEROL SULFATE 1 AMPULE: 2.5; .5 SOLUTION RESPIRATORY (INHALATION) at 17:07

## 2019-01-01 RX ADMIN — Medication 10 ML: at 10:38

## 2019-01-01 RX ADMIN — FUROSEMIDE 20 MG: 20 TABLET ORAL at 17:48

## 2019-01-01 RX ADMIN — HEPARIN SODIUM 3740 UNITS: 1000 INJECTION INTRAVENOUS; SUBCUTANEOUS at 14:39

## 2019-01-01 RX ADMIN — FUROSEMIDE 20 MG: 20 TABLET ORAL at 09:25

## 2019-01-01 RX ADMIN — HEPARIN SODIUM 9.96 UNITS/KG/HR: 10000 INJECTION, SOLUTION INTRAVENOUS at 09:24

## 2019-01-01 RX ADMIN — Medication: at 03:30

## 2019-01-01 RX ADMIN — POTASSIUM CHLORIDE 20 MEQ: 20 TABLET, EXTENDED RELEASE ORAL at 11:59

## 2019-01-01 RX ADMIN — TROSPIUM CHLORIDE 20 MG: 20 TABLET, FILM COATED ORAL at 05:50

## 2019-01-01 RX ADMIN — MEMANTINE HYDROCHLORIDE 10 MG: 10 TABLET, FILM COATED ORAL at 20:59

## 2019-01-01 RX ADMIN — POTASSIUM CHLORIDE: 2 INJECTION, SOLUTION, CONCENTRATE INTRAVENOUS at 16:18

## 2019-01-01 RX ADMIN — DOCUSATE SODIUM 100 MG: 100 CAPSULE, LIQUID FILLED ORAL at 20:38

## 2019-01-01 RX ADMIN — IOPAMIDOL 90 ML: 755 INJECTION, SOLUTION INTRAVENOUS at 11:11

## 2019-01-01 RX ADMIN — HEPARIN SODIUM 9 UNITS/KG/HR: 10000 INJECTION, SOLUTION INTRAVENOUS at 20:42

## 2019-01-01 RX ADMIN — POTASSIUM CHLORIDE 10 MEQ: 7.46 INJECTION, SOLUTION INTRAVENOUS at 23:32

## 2019-01-01 RX ADMIN — POTASSIUM CHLORIDE: 2 INJECTION, SOLUTION, CONCENTRATE INTRAVENOUS at 22:03

## 2019-01-01 RX ADMIN — Medication 10 ML: at 20:54

## 2019-01-01 RX ADMIN — SODIUM CHLORIDE, PRESERVATIVE FREE 10 ML: 5 INJECTION INTRAVENOUS at 18:23

## 2019-01-01 RX ADMIN — IPRATROPIUM BROMIDE AND ALBUTEROL SULFATE 1 AMPULE: 2.5; .5 SOLUTION RESPIRATORY (INHALATION) at 13:34

## 2019-01-01 RX ADMIN — SODIUM CHLORIDE: 9 INJECTION, SOLUTION INTRAVENOUS at 05:10

## 2019-01-01 RX ADMIN — TROSPIUM CHLORIDE 20 MG: 20 TABLET, FILM COATED ORAL at 06:43

## 2019-01-01 RX ADMIN — IPRATROPIUM BROMIDE AND ALBUTEROL SULFATE 1 AMPULE: 2.5; .5 SOLUTION RESPIRATORY (INHALATION) at 12:48

## 2019-01-01 RX ADMIN — TROSPIUM CHLORIDE 20 MG: 20 TABLET, FILM COATED ORAL at 07:00

## 2019-01-01 RX ADMIN — APIXABAN 5 MG: 5 TABLET, FILM COATED ORAL at 22:47

## 2019-01-01 RX ADMIN — SODIUM CHLORIDE: 9 INJECTION, SOLUTION INTRAVENOUS at 04:05

## 2019-01-01 RX ADMIN — PANTOPRAZOLE SODIUM 40 MG: 40 TABLET, DELAYED RELEASE ORAL at 07:00

## 2019-01-01 RX ADMIN — FUROSEMIDE 20 MG: 20 TABLET ORAL at 22:26

## 2019-01-01 RX ADMIN — MEMANTINE HYDROCHLORIDE 10 MG: 10 TABLET, FILM COATED ORAL at 21:01

## 2019-01-01 RX ADMIN — POTASSIUM CHLORIDE: 2 INJECTION, SOLUTION, CONCENTRATE INTRAVENOUS at 15:45

## 2019-01-01 RX ADMIN — ACETAMINOPHEN 650 MG: 325 TABLET, FILM COATED ORAL at 11:15

## 2019-01-01 RX ADMIN — AMLODIPINE BESYLATE 2.5 MG: 2.5 TABLET ORAL at 08:02

## 2019-01-01 RX ADMIN — WATER 1 G: 1 INJECTION INTRAMUSCULAR; INTRAVENOUS; SUBCUTANEOUS at 10:33

## 2019-01-01 RX ADMIN — HYDROMORPHONE HYDROCHLORIDE 0.2 MG: 1 INJECTION, SOLUTION INTRAMUSCULAR; INTRAVENOUS; SUBCUTANEOUS at 20:29

## 2019-01-01 RX ADMIN — APIXABAN 10 MG: 5 TABLET, FILM COATED ORAL at 22:26

## 2019-01-01 RX ADMIN — ACETAMINOPHEN 650 MG: 325 TABLET, FILM COATED ORAL at 21:18

## 2019-01-01 RX ADMIN — ASPIRIN 81 MG 81 MG: 81 TABLET ORAL at 08:02

## 2019-01-01 RX ADMIN — PANTOPRAZOLE SODIUM 40 MG: 40 TABLET, DELAYED RELEASE ORAL at 06:35

## 2019-01-01 RX ADMIN — MEMANTINE HYDROCHLORIDE 10 MG: 10 TABLET, FILM COATED ORAL at 20:13

## 2019-01-01 RX ADMIN — DONEPEZIL HYDROCHLORIDE 10 MG: 5 TABLET, FILM COATED ORAL at 10:38

## 2019-01-01 RX ADMIN — SODIUM CHLORIDE 1000 ML: 9 INJECTION, SOLUTION INTRAVENOUS at 17:25

## 2019-01-01 RX ADMIN — POTASSIUM CHLORIDE 20 MEQ: 1500 TABLET, EXTENDED RELEASE ORAL at 22:47

## 2019-01-01 RX ADMIN — ENOXAPARIN SODIUM 100 MG: 100 INJECTION SUBCUTANEOUS at 17:37

## 2019-01-01 RX ADMIN — APIXABAN 10 MG: 5 TABLET, FILM COATED ORAL at 21:01

## 2019-01-01 RX ADMIN — IPRATROPIUM BROMIDE AND ALBUTEROL SULFATE 1 AMPULE: 2.5; .5 SOLUTION RESPIRATORY (INHALATION) at 09:27

## 2019-01-01 RX ADMIN — AMPICILLIN AND SULBACTAM 3 G: 1; 2 INJECTION, POWDER, FOR SOLUTION INTRAMUSCULAR; INTRAVENOUS at 22:43

## 2019-01-01 RX ADMIN — MEMANTINE HYDROCHLORIDE 10 MG: 10 TABLET, FILM COATED ORAL at 20:53

## 2019-01-01 RX ADMIN — FUROSEMIDE 20 MG: 20 TABLET ORAL at 09:54

## 2019-01-01 RX ADMIN — HYDROMORPHONE HYDROCHLORIDE 0.2 MG: 1 INJECTION, SOLUTION INTRAMUSCULAR; INTRAVENOUS; SUBCUTANEOUS at 08:30

## 2019-01-01 RX ADMIN — POTASSIUM PHOSPHATE, MONOBASIC AND POTASSIUM PHOSPHATE, DIBASIC 10 MMOL: 224; 236 INJECTION, SOLUTION, CONCENTRATE INTRAVENOUS at 15:11

## 2019-01-01 RX ADMIN — MEMANTINE HYDROCHLORIDE 10 MG: 10 TABLET, FILM COATED ORAL at 09:54

## 2019-01-01 RX ADMIN — POTASSIUM CHLORIDE: 2 INJECTION, SOLUTION, CONCENTRATE INTRAVENOUS at 12:38

## 2019-01-01 RX ADMIN — DONEPEZIL HYDROCHLORIDE 10 MG: 5 TABLET, FILM COATED ORAL at 22:26

## 2019-01-01 RX ADMIN — ASPIRIN 300 MG: 300 SUPPOSITORY RECTAL at 18:23

## 2019-01-01 RX ADMIN — BARIUM SULFATE 4 ML: 400 PASTE ORAL at 13:15

## 2019-01-01 RX ADMIN — POTASSIUM CHLORIDE 20 MEQ: 20 TABLET, EXTENDED RELEASE ORAL at 20:53

## 2019-01-01 RX ADMIN — HEPARIN SODIUM 18 UNITS/KG/HR: 10000 INJECTION, SOLUTION INTRAVENOUS at 16:04

## 2019-01-01 RX ADMIN — MEMANTINE HYDROCHLORIDE 10 MG: 10 TABLET, FILM COATED ORAL at 09:25

## 2019-01-01 RX ADMIN — ASPIRIN 300 MG: 300 SUPPOSITORY RECTAL at 09:15

## 2019-01-01 RX ADMIN — POTASSIUM CHLORIDE 20 MEQ: 20 TABLET, EXTENDED RELEASE ORAL at 20:13

## 2019-01-01 RX ADMIN — MEMANTINE HYDROCHLORIDE 10 MG: 10 TABLET, FILM COATED ORAL at 10:19

## 2019-01-01 RX ADMIN — AMLODIPINE BESYLATE 2.5 MG: 2.5 TABLET ORAL at 11:47

## 2019-01-01 RX ADMIN — BARIUM SULFATE 15 ML: 0.81 POWDER, FOR SUSPENSION ORAL at 13:15

## 2019-01-01 RX ADMIN — TROSPIUM CHLORIDE 20 MG: 20 TABLET, FILM COATED ORAL at 17:31

## 2019-01-01 RX ADMIN — SODIUM CHLORIDE, PRESERVATIVE FREE 10 ML: 5 INJECTION INTRAVENOUS at 08:29

## 2019-01-01 RX ADMIN — MEMANTINE HYDROCHLORIDE 10 MG: 10 TABLET, FILM COATED ORAL at 20:38

## 2019-01-01 RX ADMIN — DOCUSATE SODIUM 100 MG: 100 CAPSULE, LIQUID FILLED ORAL at 21:05

## 2019-01-01 RX ADMIN — FUROSEMIDE 20 MG: 20 TABLET ORAL at 09:22

## 2019-01-01 RX ADMIN — TROSPIUM CHLORIDE 20 MG: 20 TABLET, FILM COATED ORAL at 06:35

## 2019-01-01 RX ADMIN — IOPAMIDOL 70 ML: 755 INJECTION, SOLUTION INTRAVENOUS at 16:35

## 2019-01-01 RX ADMIN — TROSPIUM CHLORIDE 20 MG: 20 TABLET, FILM COATED ORAL at 06:02

## 2019-01-01 RX ADMIN — POTASSIUM PHOSPHATE, MONOBASIC AND POTASSIUM PHOSPHATE, DIBASIC 10 MMOL: 224; 236 INJECTION, SOLUTION, CONCENTRATE INTRAVENOUS at 17:18

## 2019-01-01 RX ADMIN — ASPIRIN 81 MG: 81 TABLET, COATED ORAL at 09:54

## 2019-01-01 RX ADMIN — FUROSEMIDE 20 MG: 20 TABLET ORAL at 20:38

## 2019-01-01 RX ADMIN — POTASSIUM CHLORIDE 20 MEQ: 20 TABLET, EXTENDED RELEASE ORAL at 10:19

## 2019-01-01 RX ADMIN — ASPIRIN 300 MG: 300 SUPPOSITORY RECTAL at 09:49

## 2019-01-01 RX ADMIN — POTASSIUM CHLORIDE: 2 INJECTION, SOLUTION, CONCENTRATE INTRAVENOUS at 03:39

## 2019-01-01 RX ADMIN — ASPIRIN 300 MG: 300 SUPPOSITORY RECTAL at 08:29

## 2019-01-01 RX ADMIN — Medication 10 ML: at 20:14

## 2019-01-01 RX ADMIN — POTASSIUM CHLORIDE: 2 INJECTION, SOLUTION, CONCENTRATE INTRAVENOUS at 01:22

## 2019-01-01 RX ADMIN — ASPIRIN 81 MG: 81 TABLET, COATED ORAL at 08:49

## 2019-01-01 RX ADMIN — Medication 10 ML: at 16:35

## 2019-01-01 RX ADMIN — HEPARIN SODIUM 9 UNITS/KG/HR: 10000 INJECTION, SOLUTION INTRAVENOUS at 02:00

## 2019-01-01 RX ADMIN — FUROSEMIDE 20 MG: 20 TABLET ORAL at 08:07

## 2019-01-01 RX ADMIN — IPRATROPIUM BROMIDE AND ALBUTEROL SULFATE 1 AMPULE: 2.5; .5 SOLUTION RESPIRATORY (INHALATION) at 11:36

## 2019-01-01 RX ADMIN — SODIUM CHLORIDE: 9 INJECTION, SOLUTION INTRAVENOUS at 17:08

## 2019-01-01 RX ADMIN — IPRATROPIUM BROMIDE AND ALBUTEROL SULFATE 1 AMPULE: 2.5; .5 SOLUTION RESPIRATORY (INHALATION) at 20:55

## 2019-01-01 RX ADMIN — ASPIRIN 81 MG: 81 TABLET, COATED ORAL at 22:47

## 2019-01-01 RX ADMIN — SODIUM PHOSPHATE, MONOBASIC, MONOHYDRATE 10 MMOL: 276; 142 INJECTION, SOLUTION INTRAVENOUS at 16:59

## 2019-01-01 RX ADMIN — BARIUM SULFATE 4 ML: 400 PASTE ORAL at 14:17

## 2019-01-01 RX ADMIN — ACETAMINOPHEN 650 MG: 325 TABLET ORAL at 14:10

## 2019-01-01 RX ADMIN — PANTOPRAZOLE SODIUM 40 MG: 40 INJECTION, POWDER, FOR SOLUTION INTRAVENOUS at 10:35

## 2019-01-01 ASSESSMENT — PAIN DESCRIPTION - LOCATION
LOCATION: GENERALIZED
LOCATION: GENERALIZED
LOCATION: BACK;KNEE
LOCATION: GENERALIZED
LOCATION: ABDOMEN

## 2019-01-01 ASSESSMENT — PAIN SCALES - GENERAL
PAINLEVEL_OUTOF10: 1
PAINLEVEL_OUTOF10: 0
PAINLEVEL_OUTOF10: 0
PAINLEVEL_OUTOF10: 8
PAINLEVEL_OUTOF10: 0
PAINLEVEL_OUTOF10: 4
PAINLEVEL_OUTOF10: 5
PAINLEVEL_OUTOF10: 0
PAINLEVEL_OUTOF10: 6
PAINLEVEL_OUTOF10: 0
PAINLEVEL_OUTOF10: 0
PAINLEVEL_OUTOF10: 10
PAINLEVEL_OUTOF10: 6
PAINLEVEL_OUTOF10: 0
PAINLEVEL_OUTOF10: 0
PAINLEVEL_OUTOF10: 9
PAINLEVEL_OUTOF10: 0
PAINLEVEL_OUTOF10: 9
PAINLEVEL_OUTOF10: 4
PAINLEVEL_OUTOF10: 0
PAINLEVEL_OUTOF10: 6
PAINLEVEL_OUTOF10: 0
PAINLEVEL_OUTOF10: 8
PAINLEVEL_OUTOF10: 9
PAINLEVEL_OUTOF10: 0
PAINLEVEL_OUTOF10: 4
PAINLEVEL_OUTOF10: 0
PAINLEVEL_OUTOF10: 7
PAINLEVEL_OUTOF10: 0
PAINLEVEL_OUTOF10: 8
PAINLEVEL_OUTOF10: 6
PAINLEVEL_OUTOF10: 0
PAINLEVEL_OUTOF10: 0
PAINLEVEL_OUTOF10: 1
PAINLEVEL_OUTOF10: 0
PAINLEVEL_OUTOF10: 6
PAINLEVEL_OUTOF10: 0
PAINLEVEL_OUTOF10: 9
PAINLEVEL_OUTOF10: 0

## 2019-01-01 ASSESSMENT — PAIN SCALES - PAIN ASSESSMENT IN ADVANCED DEMENTIA (PAINAD)
NEGVOCALIZATION: 1
TOTALSCORE: 6
BREATHING: 0
BREATHING: 0
FACIALEXPRESSION: 0
FACIALEXPRESSION: 2
TOTALSCORE: 0
BODYLANGUAGE: 2
CONSOLABILITY: 0
CONSOLABILITY: 1
NEGVOCALIZATION: 0
BODYLANGUAGE: 0

## 2019-01-01 ASSESSMENT — PAIN DESCRIPTION - PROGRESSION
CLINICAL_PROGRESSION: RESOLVED
CLINICAL_PROGRESSION: NOT CHANGED

## 2019-01-01 ASSESSMENT — PAIN DESCRIPTION - ONSET
ONSET: ON-GOING

## 2019-01-01 ASSESSMENT — PAIN DESCRIPTION - DESCRIPTORS
DESCRIPTORS: ACHING;DISCOMFORT
DESCRIPTORS: ACHING;CONSTANT;DISCOMFORT
DESCRIPTORS: ACHING
DESCRIPTORS: ACHING;DISCOMFORT

## 2019-01-01 ASSESSMENT — PAIN DESCRIPTION - PAIN TYPE
TYPE: CHRONIC PAIN
TYPE: CHRONIC PAIN
TYPE: ACUTE PAIN
TYPE: CHRONIC PAIN
TYPE: CHRONIC PAIN

## 2019-01-01 ASSESSMENT — PAIN DESCRIPTION - FREQUENCY
FREQUENCY: INTERMITTENT
FREQUENCY: CONTINUOUS

## 2019-01-01 ASSESSMENT — PAIN - FUNCTIONAL ASSESSMENT
PAIN_FUNCTIONAL_ASSESSMENT: PREVENTS OR INTERFERES WITH ALL ACTIVE AND SOME PASSIVE ACTIVITIES
PAIN_FUNCTIONAL_ASSESSMENT: PREVENTS OR INTERFERES SOME ACTIVE ACTIVITIES AND ADLS
PAIN_FUNCTIONAL_ASSESSMENT: PREVENTS OR INTERFERES SOME ACTIVE ACTIVITIES AND ADLS

## 2019-01-01 ASSESSMENT — PAIN DESCRIPTION - ORIENTATION
ORIENTATION: OTHER (COMMENT)
ORIENTATION: LEFT;RIGHT

## 2019-02-17 ENCOUNTER — APPOINTMENT (OUTPATIENT)
Dept: ULTRASOUND IMAGING | Age: 84
End: 2019-02-17
Payer: MEDICARE

## 2019-02-17 ENCOUNTER — APPOINTMENT (OUTPATIENT)
Dept: CT IMAGING | Age: 84
End: 2019-02-17
Payer: MEDICARE

## 2019-02-17 ENCOUNTER — HOSPITAL ENCOUNTER (OUTPATIENT)
Age: 84
Setting detail: OBSERVATION
Discharge: NURSING, REHAB OR LONG-TERM CARE | End: 2019-02-19
Attending: EMERGENCY MEDICINE | Admitting: INTERNAL MEDICINE
Payer: MEDICARE

## 2019-02-17 ENCOUNTER — APPOINTMENT (OUTPATIENT)
Dept: GENERAL RADIOLOGY | Age: 84
End: 2019-02-17
Payer: MEDICARE

## 2019-02-17 DIAGNOSIS — G93.40 ENCEPHALOPATHY: Primary | ICD-10-CM

## 2019-02-17 DIAGNOSIS — I82.4Y9 ACUTE DEEP VEIN THROMBOSIS (DVT) OF PROXIMAL VEIN OF LOWER EXTREMITY, UNSPECIFIED LATERALITY (HCC): ICD-10-CM

## 2019-02-17 DIAGNOSIS — N39.0 COMPLICATED UTI (URINARY TRACT INFECTION): ICD-10-CM

## 2019-02-17 PROBLEM — O22.30 DVT (DEEP VEIN THROMBOSIS) IN PREGNANCY: Status: ACTIVE | Noted: 2019-02-17

## 2019-02-17 LAB
ANION GAP SERPL CALCULATED.3IONS-SCNC: 11 MMOL/L (ref 7–16)
BACTERIA: ABNORMAL /HPF
BASOPHILS ABSOLUTE: 0.03 E9/L (ref 0–0.2)
BASOPHILS RELATIVE PERCENT: 0.5 % (ref 0–2)
BILIRUBIN URINE: NEGATIVE
BLOOD, URINE: ABNORMAL
BUN BLDV-MCNC: 20 MG/DL (ref 8–23)
CALCIUM SERPL-MCNC: 9.6 MG/DL (ref 8.6–10.2)
CHLORIDE BLD-SCNC: 105 MMOL/L (ref 98–107)
CLARITY: ABNORMAL
CO2: 26 MMOL/L (ref 22–29)
COLOR: YELLOW
CREAT SERPL-MCNC: 1.1 MG/DL (ref 0.7–1.2)
EOSINOPHILS ABSOLUTE: 0.02 E9/L (ref 0.05–0.5)
EOSINOPHILS RELATIVE PERCENT: 0.3 % (ref 0–6)
GFR AFRICAN AMERICAN: >60
GFR NON-AFRICAN AMERICAN: >60 ML/MIN/1.73
GLUCOSE BLD-MCNC: 96 MG/DL (ref 74–99)
GLUCOSE URINE: NEGATIVE MG/DL
HCT VFR BLD CALC: 37.8 % (ref 37–54)
HEMOGLOBIN: 12 G/DL (ref 12.5–16.5)
IMMATURE GRANULOCYTES #: 0.05 E9/L
IMMATURE GRANULOCYTES %: 0.8 % (ref 0–5)
INR BLD: 1.3
KETONES, URINE: ABNORMAL MG/DL
LACTIC ACID: 1.2 MMOL/L (ref 0.5–2.2)
LEUKOCYTE ESTERASE, URINE: ABNORMAL
LYMPHOCYTES ABSOLUTE: 0.8 E9/L (ref 1.5–4)
LYMPHOCYTES RELATIVE PERCENT: 12.1 % (ref 20–42)
MCH RBC QN AUTO: 28.7 PG (ref 26–35)
MCHC RBC AUTO-ENTMCNC: 31.7 % (ref 32–34.5)
MCV RBC AUTO: 90.4 FL (ref 80–99.9)
MONOCYTES ABSOLUTE: 0.56 E9/L (ref 0.1–0.95)
MONOCYTES RELATIVE PERCENT: 8.5 % (ref 2–12)
NEUTROPHILS ABSOLUTE: 5.14 E9/L (ref 1.8–7.3)
NEUTROPHILS RELATIVE PERCENT: 77.8 % (ref 43–80)
NITRITE, URINE: POSITIVE
PDW BLD-RTO: 14.3 FL (ref 11.5–15)
PH UA: 7 (ref 5–9)
PLATELET # BLD: 170 E9/L (ref 130–450)
PMV BLD AUTO: 10.6 FL (ref 7–12)
POTASSIUM REFLEX MAGNESIUM: 4.5 MMOL/L (ref 3.5–5)
PRO-BNP: 815 PG/ML (ref 0–450)
PROTEIN UA: NEGATIVE MG/DL
PROTHROMBIN TIME: 14.3 SEC (ref 9.3–12.4)
RBC # BLD: 4.18 E12/L (ref 3.8–5.8)
RBC UA: ABNORMAL /HPF (ref 0–2)
SODIUM BLD-SCNC: 142 MMOL/L (ref 132–146)
SPECIFIC GRAVITY UA: 1.01 (ref 1–1.03)
TROPONIN: 0.01 NG/ML (ref 0–0.03)
UROBILINOGEN, URINE: 0.2 E.U./DL
WBC # BLD: 6.6 E9/L (ref 4.5–11.5)
WBC UA: >20 /HPF (ref 0–5)

## 2019-02-17 PROCEDURE — 87077 CULTURE AEROBIC IDENTIFY: CPT

## 2019-02-17 PROCEDURE — 99285 EMERGENCY DEPT VISIT HI MDM: CPT

## 2019-02-17 PROCEDURE — 84484 ASSAY OF TROPONIN QUANT: CPT

## 2019-02-17 PROCEDURE — G0378 HOSPITAL OBSERVATION PER HR: HCPCS

## 2019-02-17 PROCEDURE — 87088 URINE BACTERIA CULTURE: CPT

## 2019-02-17 PROCEDURE — 80048 BASIC METABOLIC PNL TOTAL CA: CPT

## 2019-02-17 PROCEDURE — 6370000000 HC RX 637 (ALT 250 FOR IP): Performed by: STUDENT IN AN ORGANIZED HEALTH CARE EDUCATION/TRAINING PROGRAM

## 2019-02-17 PROCEDURE — 96365 THER/PROPH/DIAG IV INF INIT: CPT

## 2019-02-17 PROCEDURE — 71045 X-RAY EXAM CHEST 1 VIEW: CPT

## 2019-02-17 PROCEDURE — 36415 COLL VENOUS BLD VENIPUNCTURE: CPT

## 2019-02-17 PROCEDURE — 85025 COMPLETE CBC W/AUTO DIFF WBC: CPT

## 2019-02-17 PROCEDURE — 87040 BLOOD CULTURE FOR BACTERIA: CPT

## 2019-02-17 PROCEDURE — 83605 ASSAY OF LACTIC ACID: CPT

## 2019-02-17 PROCEDURE — 87186 SC STD MICRODIL/AGAR DIL: CPT

## 2019-02-17 PROCEDURE — 83880 ASSAY OF NATRIURETIC PEPTIDE: CPT

## 2019-02-17 PROCEDURE — 6360000002 HC RX W HCPCS: Performed by: EMERGENCY MEDICINE

## 2019-02-17 PROCEDURE — 85610 PROTHROMBIN TIME: CPT

## 2019-02-17 PROCEDURE — 2580000003 HC RX 258: Performed by: STUDENT IN AN ORGANIZED HEALTH CARE EDUCATION/TRAINING PROGRAM

## 2019-02-17 PROCEDURE — 70450 CT HEAD/BRAIN W/O DYE: CPT

## 2019-02-17 PROCEDURE — 96372 THER/PROPH/DIAG INJ SC/IM: CPT

## 2019-02-17 PROCEDURE — 81001 URINALYSIS AUTO W/SCOPE: CPT

## 2019-02-17 PROCEDURE — 93970 EXTREMITY STUDY: CPT

## 2019-02-17 PROCEDURE — 2580000003 HC RX 258: Performed by: EMERGENCY MEDICINE

## 2019-02-17 RX ORDER — OXYBUTYNIN CHLORIDE 10 MG/1
10 TABLET, EXTENDED RELEASE ORAL DAILY
Status: DISCONTINUED | OUTPATIENT
Start: 2019-02-18 | End: 2019-02-19 | Stop reason: HOSPADM

## 2019-02-17 RX ORDER — POTASSIUM CHLORIDE 20 MEQ/1
20 TABLET, EXTENDED RELEASE ORAL DAILY
Status: DISCONTINUED | OUTPATIENT
Start: 2019-02-18 | End: 2019-02-18

## 2019-02-17 RX ORDER — MEMANTINE HYDROCHLORIDE 10 MG/1
10 TABLET ORAL 2 TIMES DAILY
Status: DISCONTINUED | OUTPATIENT
Start: 2019-02-17 | End: 2019-02-19 | Stop reason: HOSPADM

## 2019-02-17 RX ORDER — DONEPEZIL HYDROCHLORIDE 5 MG/1
10 TABLET, FILM COATED ORAL NIGHTLY
Status: DISCONTINUED | OUTPATIENT
Start: 2019-02-17 | End: 2019-02-19 | Stop reason: HOSPADM

## 2019-02-17 RX ORDER — AMLODIPINE BESYLATE 2.5 MG/1
2.5 TABLET ORAL DAILY
Status: DISCONTINUED | OUTPATIENT
Start: 2019-02-17 | End: 2019-02-19 | Stop reason: HOSPADM

## 2019-02-17 RX ORDER — SODIUM CHLORIDE 0.9 % (FLUSH) 0.9 %
10 SYRINGE (ML) INJECTION EVERY 12 HOURS SCHEDULED
Status: DISCONTINUED | OUTPATIENT
Start: 2019-02-17 | End: 2019-02-19

## 2019-02-17 RX ORDER — SODIUM CHLORIDE 0.9 % (FLUSH) 0.9 %
10 SYRINGE (ML) INJECTION 2 TIMES DAILY
Status: DISCONTINUED | OUTPATIENT
Start: 2019-02-17 | End: 2019-02-19 | Stop reason: HOSPADM

## 2019-02-17 RX ORDER — SODIUM CHLORIDE 0.9 % (FLUSH) 0.9 %
10 SYRINGE (ML) INJECTION PRN
Status: DISCONTINUED | OUTPATIENT
Start: 2019-02-17 | End: 2019-02-19 | Stop reason: HOSPADM

## 2019-02-17 RX ORDER — ONDANSETRON 2 MG/ML
4 INJECTION INTRAMUSCULAR; INTRAVENOUS EVERY 6 HOURS PRN
Status: DISCONTINUED | OUTPATIENT
Start: 2019-02-17 | End: 2019-02-19 | Stop reason: HOSPADM

## 2019-02-17 RX ORDER — FUROSEMIDE 20 MG/1
20 TABLET ORAL DAILY
Status: DISCONTINUED | OUTPATIENT
Start: 2019-02-18 | End: 2019-02-19 | Stop reason: HOSPADM

## 2019-02-17 RX ORDER — ASPIRIN 81 MG/1
81 TABLET, CHEWABLE ORAL DAILY
Status: DISCONTINUED | OUTPATIENT
Start: 2019-02-17 | End: 2019-02-19 | Stop reason: HOSPADM

## 2019-02-17 RX ORDER — MELOXICAM 7.5 MG/1
7.5 TABLET ORAL DAILY
Status: DISCONTINUED | OUTPATIENT
Start: 2019-02-18 | End: 2019-02-18

## 2019-02-17 RX ADMIN — DONEPEZIL HYDROCHLORIDE 10 MG: 5 TABLET, FILM COATED ORAL at 21:55

## 2019-02-17 RX ADMIN — Medication 10 ML: at 21:55

## 2019-02-17 RX ADMIN — CEFTRIAXONE SODIUM 1 G: 1 INJECTION, POWDER, FOR SOLUTION INTRAMUSCULAR; INTRAVENOUS at 17:53

## 2019-02-17 RX ADMIN — MEMANTINE HYDROCHLORIDE 10 MG: 10 TABLET, FILM COATED ORAL at 21:55

## 2019-02-17 RX ADMIN — ENOXAPARIN SODIUM 90 MG: 100 INJECTION SUBCUTANEOUS at 18:23

## 2019-02-17 ASSESSMENT — PAIN SCALES - GENERAL: PAINLEVEL_OUTOF10: 0

## 2019-02-18 PROBLEM — I82.409 DVT (DEEP VENOUS THROMBOSIS) (HCC): Status: ACTIVE | Noted: 2019-02-18

## 2019-02-18 LAB
ANION GAP SERPL CALCULATED.3IONS-SCNC: 12 MMOL/L (ref 7–16)
BUN BLDV-MCNC: 25 MG/DL (ref 8–23)
CALCIUM SERPL-MCNC: 8.9 MG/DL (ref 8.6–10.2)
CHLORIDE BLD-SCNC: 102 MMOL/L (ref 98–107)
CO2: 24 MMOL/L (ref 22–29)
CREAT SERPL-MCNC: 1.1 MG/DL (ref 0.7–1.2)
GFR AFRICAN AMERICAN: >60
GFR NON-AFRICAN AMERICAN: >60 ML/MIN/1.73
GLUCOSE BLD-MCNC: 116 MG/DL (ref 74–99)
HCT VFR BLD CALC: 35.3 % (ref 37–54)
HEMOGLOBIN: 11.3 G/DL (ref 12.5–16.5)
POTASSIUM SERPL-SCNC: 3.6 MMOL/L (ref 3.5–5)
SODIUM BLD-SCNC: 138 MMOL/L (ref 132–146)

## 2019-02-18 PROCEDURE — 6360000002 HC RX W HCPCS: Performed by: INTERNAL MEDICINE

## 2019-02-18 PROCEDURE — 97162 PT EVAL MOD COMPLEX 30 MIN: CPT

## 2019-02-18 PROCEDURE — 85018 HEMOGLOBIN: CPT

## 2019-02-18 PROCEDURE — 2580000003 HC RX 258: Performed by: EMERGENCY MEDICINE

## 2019-02-18 PROCEDURE — 97165 OT EVAL LOW COMPLEX 30 MIN: CPT

## 2019-02-18 PROCEDURE — G0378 HOSPITAL OBSERVATION PER HR: HCPCS

## 2019-02-18 PROCEDURE — 97530 THERAPEUTIC ACTIVITIES: CPT

## 2019-02-18 PROCEDURE — 85014 HEMATOCRIT: CPT

## 2019-02-18 PROCEDURE — 96372 THER/PROPH/DIAG INJ SC/IM: CPT

## 2019-02-18 PROCEDURE — 36415 COLL VENOUS BLD VENIPUNCTURE: CPT

## 2019-02-18 PROCEDURE — 6370000000 HC RX 637 (ALT 250 FOR IP): Performed by: STUDENT IN AN ORGANIZED HEALTH CARE EDUCATION/TRAINING PROGRAM

## 2019-02-18 PROCEDURE — 80048 BASIC METABOLIC PNL TOTAL CA: CPT

## 2019-02-18 PROCEDURE — 6370000000 HC RX 637 (ALT 250 FOR IP): Performed by: INTERNAL MEDICINE

## 2019-02-18 PROCEDURE — 2580000003 HC RX 258: Performed by: STUDENT IN AN ORGANIZED HEALTH CARE EDUCATION/TRAINING PROGRAM

## 2019-02-18 PROCEDURE — 99221 1ST HOSP IP/OBS SF/LOW 40: CPT | Performed by: SURGERY

## 2019-02-18 RX ORDER — ACETAMINOPHEN 325 MG/1
650 TABLET ORAL EVERY 4 HOURS PRN
Status: DISCONTINUED | OUTPATIENT
Start: 2019-02-18 | End: 2019-02-19 | Stop reason: HOSPADM

## 2019-02-18 RX ORDER — CEFDINIR 300 MG/1
300 CAPSULE ORAL EVERY 12 HOURS SCHEDULED
Status: DISCONTINUED | OUTPATIENT
Start: 2019-02-18 | End: 2019-02-19 | Stop reason: HOSPADM

## 2019-02-18 RX ADMIN — ENOXAPARIN SODIUM 90 MG: 100 INJECTION SUBCUTANEOUS at 08:46

## 2019-02-18 RX ADMIN — Medication 10 ML: at 20:37

## 2019-02-18 RX ADMIN — DONEPEZIL HYDROCHLORIDE 10 MG: 5 TABLET, FILM COATED ORAL at 20:37

## 2019-02-18 RX ADMIN — Medication 10 ML: at 08:47

## 2019-02-18 RX ADMIN — ENOXAPARIN SODIUM 90 MG: 100 INJECTION SUBCUTANEOUS at 20:37

## 2019-02-18 RX ADMIN — AMLODIPINE BESYLATE 2.5 MG: 2.5 TABLET ORAL at 08:47

## 2019-02-18 RX ADMIN — Medication 10 ML: at 08:49

## 2019-02-18 RX ADMIN — MEMANTINE HYDROCHLORIDE 10 MG: 10 TABLET, FILM COATED ORAL at 08:47

## 2019-02-18 RX ADMIN — FUROSEMIDE 20 MG: 20 TABLET ORAL at 08:47

## 2019-02-18 RX ADMIN — MEMANTINE HYDROCHLORIDE 10 MG: 10 TABLET, FILM COATED ORAL at 20:36

## 2019-02-18 RX ADMIN — ACETAMINOPHEN 650 MG: 325 TABLET, FILM COATED ORAL at 14:30

## 2019-02-18 RX ADMIN — OXYBUTYNIN CHLORIDE 10 MG: 10 TABLET, EXTENDED RELEASE ORAL at 08:47

## 2019-02-18 RX ADMIN — ASPIRIN 81 MG 81 MG: 81 TABLET ORAL at 08:48

## 2019-02-18 RX ADMIN — CEFDINIR 300 MG: 300 CAPSULE ORAL at 20:36

## 2019-02-18 ASSESSMENT — PAIN SCALES - GENERAL
PAINLEVEL_OUTOF10: 0
PAINLEVEL_OUTOF10: 6
PAINLEVEL_OUTOF10: 0

## 2019-02-18 ASSESSMENT — PAIN SCALES - WONG BAKER: WONGBAKER_NUMERICALRESPONSE: 0

## 2019-02-19 VITALS
OXYGEN SATURATION: 100 % | RESPIRATION RATE: 13 BRPM | HEIGHT: 75 IN | TEMPERATURE: 97.2 F | DIASTOLIC BLOOD PRESSURE: 78 MMHG | WEIGHT: 206 LBS | SYSTOLIC BLOOD PRESSURE: 154 MMHG | HEART RATE: 61 BPM | BODY MASS INDEX: 25.61 KG/M2

## 2019-02-19 LAB
BASOPHILS ABSOLUTE: 0.03 E9/L (ref 0–0.2)
BASOPHILS RELATIVE PERCENT: 0.4 % (ref 0–2)
EOSINOPHILS ABSOLUTE: 0.08 E9/L (ref 0.05–0.5)
EOSINOPHILS RELATIVE PERCENT: 1.2 % (ref 0–6)
HCT VFR BLD CALC: 33.9 % (ref 37–54)
HEMOGLOBIN: 10.8 G/DL (ref 12.5–16.5)
IMMATURE GRANULOCYTES #: 0.02 E9/L
IMMATURE GRANULOCYTES %: 0.3 % (ref 0–5)
LYMPHOCYTES ABSOLUTE: 1.14 E9/L (ref 1.5–4)
LYMPHOCYTES RELATIVE PERCENT: 16.7 % (ref 20–42)
MCH RBC QN AUTO: 29 PG (ref 26–35)
MCHC RBC AUTO-ENTMCNC: 31.9 % (ref 32–34.5)
MCV RBC AUTO: 90.9 FL (ref 80–99.9)
MONOCYTES ABSOLUTE: 0.58 E9/L (ref 0.1–0.95)
MONOCYTES RELATIVE PERCENT: 8.5 % (ref 2–12)
NEUTROPHILS ABSOLUTE: 4.97 E9/L (ref 1.8–7.3)
NEUTROPHILS RELATIVE PERCENT: 72.9 % (ref 43–80)
ORGANISM: ABNORMAL
PDW BLD-RTO: 14.7 FL (ref 11.5–15)
PLATELET # BLD: 151 E9/L (ref 130–450)
PMV BLD AUTO: 10.4 FL (ref 7–12)
RBC # BLD: 3.73 E12/L (ref 3.8–5.8)
URINE CULTURE, ROUTINE: ABNORMAL
URINE CULTURE, ROUTINE: ABNORMAL
WBC # BLD: 6.8 E9/L (ref 4.5–11.5)

## 2019-02-19 PROCEDURE — 6370000000 HC RX 637 (ALT 250 FOR IP): Performed by: STUDENT IN AN ORGANIZED HEALTH CARE EDUCATION/TRAINING PROGRAM

## 2019-02-19 PROCEDURE — 37191 INS ENDOVAS VENA CAVA FILTR: CPT | Performed by: SURGERY

## 2019-02-19 PROCEDURE — 2500000003 HC RX 250 WO HCPCS

## 2019-02-19 PROCEDURE — 2709999900 HC NON-CHARGEABLE SUPPLY

## 2019-02-19 PROCEDURE — 6360000002 HC RX W HCPCS: Performed by: INTERNAL MEDICINE

## 2019-02-19 PROCEDURE — 6370000000 HC RX 637 (ALT 250 FOR IP): Performed by: INTERNAL MEDICINE

## 2019-02-19 PROCEDURE — 96372 THER/PROPH/DIAG INJ SC/IM: CPT

## 2019-02-19 PROCEDURE — 85025 COMPLETE CBC W/AUTO DIFF WBC: CPT

## 2019-02-19 PROCEDURE — G0378 HOSPITAL OBSERVATION PER HR: HCPCS

## 2019-02-19 PROCEDURE — 2060000000 HC ICU INTERMEDIATE R&B

## 2019-02-19 PROCEDURE — C1880 VENA CAVA FILTER: HCPCS

## 2019-02-19 PROCEDURE — 2580000003 HC RX 258: Performed by: STUDENT IN AN ORGANIZED HEALTH CARE EDUCATION/TRAINING PROGRAM

## 2019-02-19 PROCEDURE — 36415 COLL VENOUS BLD VENIPUNCTURE: CPT

## 2019-02-19 PROCEDURE — C1769 GUIDE WIRE: HCPCS

## 2019-02-19 PROCEDURE — 99999 PR OFFICE/OUTPT VISIT,PROCEDURE ONLY: CPT | Performed by: SURGERY

## 2019-02-19 PROCEDURE — 2580000003 HC RX 258: Performed by: EMERGENCY MEDICINE

## 2019-02-19 RX ORDER — CEFDINIR 300 MG/1
300 CAPSULE ORAL EVERY 12 HOURS SCHEDULED
Qty: 10 CAPSULE | Refills: 0 | Status: SHIPPED | OUTPATIENT
Start: 2019-02-19 | End: 2019-02-24

## 2019-02-19 RX ADMIN — Medication 10 ML: at 08:57

## 2019-02-19 RX ADMIN — Medication 10 ML: at 08:59

## 2019-02-19 RX ADMIN — CEFDINIR 300 MG: 300 CAPSULE ORAL at 19:50

## 2019-02-19 RX ADMIN — MEMANTINE HYDROCHLORIDE 10 MG: 10 TABLET, FILM COATED ORAL at 19:49

## 2019-02-19 RX ADMIN — DONEPEZIL HYDROCHLORIDE 10 MG: 5 TABLET, FILM COATED ORAL at 19:49

## 2019-02-19 RX ADMIN — MEMANTINE HYDROCHLORIDE 10 MG: 10 TABLET, FILM COATED ORAL at 08:56

## 2019-02-19 RX ADMIN — ACETAMINOPHEN 650 MG: 325 TABLET, FILM COATED ORAL at 09:38

## 2019-02-19 RX ADMIN — CEFDINIR 300 MG: 300 CAPSULE ORAL at 08:56

## 2019-02-19 RX ADMIN — ASPIRIN 81 MG 81 MG: 81 TABLET ORAL at 08:56

## 2019-02-19 RX ADMIN — ENOXAPARIN SODIUM 90 MG: 100 INJECTION SUBCUTANEOUS at 08:56

## 2019-02-19 RX ADMIN — OXYBUTYNIN CHLORIDE 10 MG: 10 TABLET, EXTENDED RELEASE ORAL at 08:56

## 2019-02-19 RX ADMIN — FUROSEMIDE 20 MG: 20 TABLET ORAL at 08:56

## 2019-02-19 RX ADMIN — AMLODIPINE BESYLATE 2.5 MG: 2.5 TABLET ORAL at 08:56

## 2019-02-19 ASSESSMENT — PAIN SCALES - WONG BAKER
WONGBAKER_NUMERICALRESPONSE: 0
WONGBAKER_NUMERICALRESPONSE: 0

## 2019-02-19 ASSESSMENT — PAIN DESCRIPTION - LOCATION: LOCATION: ARM;LEG

## 2019-02-19 ASSESSMENT — PAIN DESCRIPTION - PAIN TYPE: TYPE: ACUTE PAIN

## 2019-02-19 ASSESSMENT — PAIN SCALES - GENERAL
PAINLEVEL_OUTOF10: 0
PAINLEVEL_OUTOF10: 4

## 2019-02-19 ASSESSMENT — PAIN DESCRIPTION - ORIENTATION: ORIENTATION: LEFT;RIGHT

## 2019-02-19 ASSESSMENT — PAIN DESCRIPTION - ONSET: ONSET: ON-GOING

## 2019-02-21 LAB
EKG ATRIAL RATE: 75 BPM
EKG Q-T INTERVAL: 388 MS
EKG QRS DURATION: 112 MS
EKG QTC CALCULATION (BAZETT): 436 MS
EKG R AXIS: -29 DEGREES
EKG T AXIS: 69 DEGREES
EKG VENTRICULAR RATE: 76 BPM

## 2019-02-22 LAB
BLOOD CULTURE, ROUTINE: NORMAL
CULTURE, BLOOD 2: NORMAL

## 2019-04-05 NOTE — CARE COORDINATION
CM spoke with Yaquelin Grande with [facility] [place] [facility]. She stated that patient has used his 20 days under his insurance and if he returns, it would be a 160.00/day copay. CM relayed this information to patient's niece, Laurel Rubalcava. She informed this CM that she has been in touch with the [facility] and the patient is able to go to a facility for 30 days under his [facility] benefits. CM left a voice message for Liz Natalia from the [facility] to discuss discharge planning. Elias Shanks, MAGDA    ADDENDUM:  CM spoke with Pily Pryor from the [facility]. This patient is not eligible for skilled nursing care. He is not service connected. He is only eligible for 30 days of respite care, which would not be appropriate at this time, therefore, patient would not be able to transfer to a [address] [place] [address] approved facility. CM explained this information to the patient and his niece, Laurel Rubalcava. CM also asked patient and Laurel Gini if patient would be able to pay 160.00/day co-pay to [facility]. Patient agrees to pay the co-pay. CM spoke to Yaquelin Grande from [facility], and they will submit info to [facility] Medicare in an attempt to obtain an auth today. H&P faxed to: [phone]. Elias Shanks RN    ADDENDUM:  Per Any at [facility], precert has been obtained and patient has been accepted at [facility]. CM contacted [facility] for transport per ambulette. They will  as soon as possible. Pass obtained and faxed with all other pertinent information to facility at [phone]. Nurse to nurse to be called to: [phone]. Claude Robbins RN informed. Patient and niece, Laurel Rubalcava informed of acceptance at facility.    Elias Shanks RN

## 2019-04-05 NOTE — PROGRESS NOTES
Physical Therapy  Initial Assessment     Name: Gisel Ross  : 1932  MRN: [id number]    Date of Service: 2019    Evaluating PT: Annetta Ro, PT, DPT [id number]    Room #:  18B/18B-18    Diagnosis: Fall  Precautions: Fall risk, dementia, incontinent of urine    Pt is questionable historian. Per chart, pt lives with wife and granddaughter in a single story house with 6 stair(s) and 1 rail(s) to enter. Bed is on the first floor and bath is on the first floor. Pt ambulated with [facility] prior to admission. HPI: Pt presented to the ED on  for fall at home. Pt was recently discharged home from [place] [address] on 3/31. Initial Evaluation  Date: 19 Treatment Date: Short Term/ Long Term   Goals   AM-PAC 6 Clicks      Was pt agreeable to Eval/treatment? Yes     Does pt have pain? No current complaints of pain     Bed Mobility  Rolling: NT  Supine to sit: Mod A  Sit to supine: Max A  Scooting: Max A toward EOB  Rolling: Min A  Supine to sit: Min A  Sit to supine: Min A  Scooting: Min A   Transfers Sit to stand: Max A x2  Stand to sit: Max A x2  Stand pivot: NT  Sit to stand: Max A  Stand to sit: Max A  Stand pivot: Max A with [facility]   Ambulation   1 sidestep to L with [facility] with Max A x2  >10 feet with [facility] with Max A   Stair negotiation: NT  NA   ROM B UE: Refer to OT note  B LE: WFL     Strength B UE: Refer to OT note  B LE: NT     Balance Sitting EOB: SBA  Dynamic standing: Max A x2 with [facility]  Sitting EOB: Supervision  Dynamic standing: Max A with [facility]     Pt is A & O x: 2 to person and place. Pt able to recall year but not month. Pt is confused to situation and denied fall at home. Sensation: NT  Edema: B LE edema noted. ASSESSMENT    Patient education  Pt educated on hand placement during sit to stand transfer.     Patient response to education:   Pt verbalized understanding Pt demonstrated skill Pt requires further education in this area   Yes Yes Reinforce     Comments:   Pt was supine in bed upon room entry, agreeable to PT evaluation with OT collaboration. Pt was confused and delayed but was able to follow simple commands. Pt was incontinent of urine as therapists were obtaining social history; pt's B LE were cleaned. Pt required assistance for B LE and trunk mobility during supine to sit transfer. Pt performed sit to stand transfer and required cueing for hand placement. Pt was moderately unsteady while standing. Pt performed 1 sidestep toward HOB and required maximum cueing for sequencing; pt had difficulty advancing L LE due to pain in R knee when weightbearing. Pt was seated at EOB and performed lateral seated scoot to L. Pt was assisted back to supine position. Pt was left supine in bed with all needs met at conclusion of session. CM notified of pt's performance. Pts/ family goals   None stated. Patient and or family understand(s) diagnosis, prognosis, and plan of care:  No.    PLAN  PT care will be provided in accordance with the objectives noted above. Whenever appropriate, clear delegation orders will be provided for nursing staff. Exercises and functional mobility practice will be used as well as appropriate assistive devices or modalities to obtain goals. Patient and family education will also be administered as needed. Frequency of treatments: 2-5x/week x 3-5 days.     Time in: 1150  Time out: Vimal 93, PT, DPT  [id number]

## 2019-04-05 NOTE — PROGRESS NOTES
Occupational Therapy  OCCUPATIONAL THERAPY INITIAL EVALUATION      Date:2019  Patient Name: Lidia Walsh  MRN: [id number]  : 1932  Room: [address] [place] [address]     Evaluating OT: Katie Mcfadden OTR/L #[id number]     AM-PAC Daily Activity Raw Score:     Recommended Adaptive Equipment:  TBD     Diagnosis: none   Patient presented to ED following fall at home. Pt recently discharged from [facility] 3/31/19     Pertinent Medical History:  Dementia, Macular degeneration    Precautions:  Falls     Home Living: Pt lives with wife and granddaughter in a 1 story home with 6 BERONICA and 1 hand rail   Bathroom setup: tub/shower combo with tub bench   Equipment owned: tub bench, w/w, w/c, cane    Prior Level of Function: per patient: mod I with ADLs , assist with IADLs; ambulated with w/w   Driving: no  Occupation: retired    Pain Level: Pt denies pain this session    Cognition: A&O: 3/4 (person, place, session); Follows 1 step directions   Memory:  F-   Sequencing: P+   Problem solving:  P+   Judgement/safety:  Poor     Functional Assessment:   Initial Eval Status  Date: 19 Treatment Status  Date: Short Term Goals  Treatment frequency: PRN   Feeding set-up   indep    Grooming Minimal Assist   Stand by Assist    UB Dressing Minimal Assist   Stand by Assist    LB Dressing Dependent   Maximal Assist    Bathing Maximal Assist  Moderate Assist    Toileting Dependent  Moderate Assist    Bed Mobility  Supine to sit: Moderate Assist   Sit to supine: Maximal Assist   Supine to sit: Minimal Assist   Sit to supine: Minimal Assist    Functional Transfers Maximal Assist x2  Moderate Assist    Functional Mobility Maximal Assist   Side steps to [facility] with w/w - max cuing on sequencing and safety   Moderate Assist    Balance Sitting:     Static:  SBA    Dynamic:min A  Standing:  Max A     Activity Tolerance F-  (light activity)  F+   Visual/  Perceptual Glasses: yes   wfl               Hand dominance: right     Strength ROM Additional Info: RUE   4-/5 wfl good  and wfl FMC/dexterity noted during ADL tasks     LUE 4-/5 wfl good  and wfl FMC/dexterity noted during ADL tasks     Hearing: wfl  Sensation:wfl  Tone: wfl  Edema:none noted                             Comments/Treatment: Upon arrival, patient supine in bed and agreeable to OT Session. Therapist facilitated bed mobility, sitting balance, functional sit to stand transfers and side stepping to [facility] with w/w - cuing on posture, balance, body mechanics and sequencing. Pt incontinent of urine upon room entry; facilitated UB/LB dressing, toileting hygiene, and grooming task - cuing on posture, sequencing and safety. Pt demonstrating P+ understanding of education/techniques, requiring additional training / education. At end of session, patient supine in bed with call light and phone within reach, all lines and tubes intact. Pt would benefit from continued skilled OT to increase functional independence and quality of life.     Eval Complexity: mod  Profile and History- med (extensive chart review)  Assessment of Occupational Performance and Identification of Deficits- med  Clinical Decision Making- med     (Evaluation time includes thorough review of current medical information, gathering information on past medical history/social history and prior level of function, completion of standardized testing/informal observation of tasks, assessment of data, and development of POC/Goals.)      Assessment of current deficits   Functional mobility [x]  ADLs [x] Strength [x]  Cognition [x]  Functional transfers  [x] IADLs [x] Safety Awareness [x]  Endurance [x]  Fine Motor Coordination [] Balance [x] Vision/perception [] Sensation []   Gross Motor Coordination [] ROM [] Delirium []                  Motor Control []    Plan of Care:   ADL retraining [x]   Equipment needs [x]   Neuromuscular re-education [x] Energy Conservation Techniques [x]  Functional Transfer training [x] Patient and/or Family

## 2019-04-05 NOTE — ED NOTES
Bed: 18B-18  Expected date:   Expected time:   Means of arrival:   Comments:  ems     Yvon Lacy RN  04/05/19 2132

## 2019-04-05 NOTE — CARE COORDINATION
Social Work/Discharge Planning:    Pt presents to the ED secondary to a fall out of bed at home. Per report, family is requesting nursing facility placement. Per Nara RN/CM, pt is a little confused but states he is agreeable to return to [address] [place] [address]. SERAFIN spoke to pt's niece Tiny Villeda [phone]) over the phone. Pt's niece reports pt fell last evening and they were able to get him back into bed but then again this morning he was found on the floor. Pt stated to family that his \"knees buckle\". Pt's niece reports pt has history of dementia and has been hallucinating thinking their were kids in his home. Pt's niece reports pt, wife, and granddaughter Cb Rosen) live in the same home. Per niece, pt's wife currently has her own health issues. Pt has a cane, wheeled walker, and wheel chair at home. Pt was just discharged from [address] [place] [address] on 3/31 and is currently active with [facility]. Pt's PCP is Dr Cooper Eckert and he uses [facility] in [facility]. Pt's niece reports plan will be for pt to go to [facility] upon discharge. MAGDA Bains/CM, spoke to Aislinn with [facility], and waiting for return call. SERAFIN to follow.

## 2019-04-05 NOTE — ED PROVIDER NOTES
LABS:  Results for orders placed or performed during the hospital encounter of 04/05/19   Troponin   Result Value Ref Range    Troponin 0.02 0.00 - 0.03 ng/mL   CBC Auto Differential   Result Value Ref Range    WBC 5.2 4.5 - 11.5 E9/L    RBC 3.73 (L) 3.80 - 5.80 E12/L    Hemoglobin 10.8 (L) 12.5 - 16.5 g/dL    Hematocrit 34.9 (L) 37.0 - 54.0 %    MCV 93.6 80.0 - 99.9 fL    MCH 29.0 26.0 - 35.0 pg    MCHC 30.9 (L) 32.0 - 34.5 %    RDW 15.3 (H) 11.5 - 15.0 fL    Platelets 186 780 - 086 E9/L    MPV 10.1 7.0 - 12.0 fL    Neutrophils % 73.7 43.0 - 80.0 %    Immature Granulocytes % 0.4 0.0 - 5.0 %    Lymphocytes % 15.5 (L) 20.0 - 42.0 %    Monocytes % 9.2 2.0 - 12.0 %    Eosinophils % 0.6 0.0 - 6.0 %    Basophils % 0.6 0.0 - 2.0 %    Neutrophils # 3.87 1.80 - 7.30 E9/L    Immature Granulocytes # 0.02 E9/L    Lymphocytes # 0.81 (L) 1.50 - 4.00 E9/L    Monocytes # 0.48 0.10 - 0.95 E9/L    Eosinophils # 0.03 (L) 0.05 - 0.50 E9/L    Basophils # 0.03 0.00 - 0.20 E9/L   Comprehensive Metabolic Panel   Result Value Ref Range    Sodium 146 132 - 146 mmol/L    Potassium 4.9 3.5 - 5.0 mmol/L    Chloride 107 98 - 107 mmol/L    CO2 27 22 - 29 mmol/L    Anion Gap 12 7 - 16 mmol/L    Glucose 95 74 - 99 mg/dL    BUN 35 (H) 8 - 23 mg/dL    CREATININE 1.4 (H) 0.7 - 1.2 mg/dL    GFR Non-African American 58 >=60 mL/min/1.73    GFR African American 58     Calcium 9.5 8.6 - 10.2 mg/dL    Total Protein 7.6 6.4 - 8.3 g/dL    Alb 4.2 3.5 - 5.2 g/dL    Total Bilirubin 0.6 0.0 - 1.2 mg/dL    Alkaline Phosphatase 64 40 - 129 U/L    ALT 26 0 - 40 U/L    AST 47 (H) 0 - 39 U/L   Urinalysis   Result Value Ref Range    Color, UA Yellow Straw/Yellow    Clarity, UA Clear Clear    Glucose, Ur Negative Negative mg/dL    Bilirubin Urine Negative Negative    Ketones, Urine Negative Negative mg/dL    Specific Gravity, UA 1.020 1.005 - 1.030    Blood, Urine TRACE-INTACT Negative    pH, UA 5.5 5.0 - 9.0    Protein, UA Negative Negative mg/dL    Urobilinogen, Urine 0.2 <2.0 E.U./dL    Nitrite, Urine Negative Negative    Leukocyte Esterase, Urine Negative Negative   Microscopic Urinalysis   Result Value Ref Range    Casts MODERATE /LPF    WBC, UA 0-1 0 - 5 /HPF    RBC, UA 1-3 0 - 2 /HPF    Bacteria, UA FEW (A) /HPF   EKG 12 Lead   Result Value Ref Range    Ventricular Rate 71 BPM    Atrial Rate 70 BPM    QRS Duration 112 ms    Q-T Interval 414 ms    QTc Calculation (Bazett) 449 ms    R Axis -32 degrees    T Axis 56 degrees       RADIOLOGY:  Interpreted by Radiologist.  CT Head WO Contrast   Final Result   Atrophy and chronic microvascular ischemic disease. XR CHEST STANDARD (2 VW)   Final Result   Cardiomegaly and emphysema. EKG:  This EKG is signed and interpreted by ep. Rate: 71  Rhythm: Atrial fibrillation  Interpretation: no acute changes  Comparison: stable as compared to patient's most recent EKG    ------------------------- NURSING NOTES AND VITALS REVIEWED ---------------------------   The nursing notes within the ED encounter and vital signs as below have been reviewed. BP (!) 140/86   Pulse 60   Temp 97 °F (36.1 °C)   Resp 14   Wt 200 lb (90.7 kg)   SpO2 97%   BMI 25.00 kg/m²   Oxygen Saturation Interpretation: Normal      ---------------------------------------------------PHYSICAL EXAM--------------------------------------      Constitutional/General: Alert and oriented x3, well appearing, non toxic in NAD  Head: NC/AT  Eyes: PERRL, EOMI  Mouth: Oropharynx clear, handling secretions, no trismus  Neck: Supple, full ROM, no meningeal signs, no tenderness on palpation to the midline of the cervical spine. Pulmonary: Lungs clear to auscultation bilaterally, no wheezes, rales, or rhonchi. Not in respiratory distress  Cardiovascular:  Regular rate and rhythm, no murmurs, gallops, or rubs. 2+ distal pulses  Abdomen: Soft, non tender, non distended,   Extremities: Moves all extremities x 4.  Warm and well perfused, no tenderness on palpation to the upper or lower extremities. Is full range of motion. No edema noted to the bilateral lower extremities which appears chronic. Skin: warm and dry without rash  Neurologic: GCS 15,  Psych: Normal Affect      ------------------------------ ED COURSE/MEDICAL DECISION MAKING----------------------  Medications - No data to display      Medical Decision Making:    Please see  notes for disposition plan patient was evaluated by Dr. Tree Andrews and plan for discharge to [place] [address]. Counseling: The emergency provider has spoken with the patient and family member patient and daughter and discussed todays results, in addition to providing specific details for the plan of care and counseling regarding the diagnosis and prognosis. Questions are answered at this time and they are agreeable with the plan.      --------------------------------- IMPRESSION AND DISPOSITION ---------------------------------    IMPRESSION  1.  Fall, initial encounter        DISPOSITION  Disposition: Discharge to nursing home  Patient condition is stable                  BASSEM Chan - TIM  04/05/19 8199

## 2019-05-26 PROBLEM — R53.1 GENERALIZED WEAKNESS: Status: ACTIVE | Noted: 2019-01-01

## 2019-05-26 NOTE — ED NOTES
Pt clothing (pants and shirts) saturated in urine, pt did have an adult diapher on which was also saturated in urine.   Pt's clothing removed and hospital gown on     Rainer Tse RN  05/25/19 Israel Tracey RN  05/25/19 1131

## 2019-05-26 NOTE — ED NOTES
Bed: 14B-14  Expected date:   Expected time:   Means of arrival:   Comments:  [address] Rain Putnam RN  05/25/19 8830

## 2019-05-26 NOTE — PLAN OF CARE
Problem: Falls - Risk of:  Goal: Will remain free from falls  Description  Will remain free from falls  Outcome: Met This Shift  Goal: Absence of physical injury  Description  Absence of physical injury  Outcome: Met This Shift     Problem: Risk for Impaired Skin Integrity  Goal: Tissue integrity - skin and mucous membranes  Description  Structural intactness and normal physiological function of skin and  mucous membranes.   Outcome: Met This Shift     Problem: Skin Integrity:  Goal: Will show no infection signs and symptoms  Description  Will show no infection signs and symptoms  Outcome: Met This Shift  Goal: Absence of new skin breakdown  Description  Absence of new skin breakdown  Outcome: Met This Shift     Problem: Pain:  Goal: Pain level will decrease  Description  Pain level will decrease  5/26/2019 1723 by Laly Landers RN  Outcome: Met This Shift  5/26/2019 0507 by Citlaly Osborn RN  Outcome: Met This Shift  Goal: Control of acute pain  Description  Control of acute pain  Outcome: Met This Shift  Goal: Control of chronic pain  Description  Control of chronic pain  Outcome: Met This Shift

## 2019-05-26 NOTE — ED NOTES
Received report from Eduardo, [address] [place] [address].       Justus Dye RN  05/25/19 9060

## 2019-05-26 NOTE — ED PROVIDER NOTES
Department of Emergency Medicine   ED  Provider Note  Admit Date/RoomTime: 5/25/2019  9:54 PM  ED Room: 14/14B-14      History of Present Illness:  5/25/19, Time: 9:59 PM         Tristan Bolton is a 80 y.o. male presenting to the ED for back pain, beginning this morning. The complaint has been persistent, mild in severity, and worsened by nothing. Other symptoms include thigh/knee pain. EMS states pt normally walks at home, but has experienced decreased mobility due to pain. Patient reportedly weaker than normal.. There is no history of incontinence. EMS also states the patient fell two days ago and hit his head. Patient does complain of lower back pain. However, they are unsure if he ever received medical treatment. Patient reporting no abdominal pain or  Chest pain or vomiting or shortness of breath. Review of Systems:   Pertinent positives and negatives are stated within HPI, all other systems reviewed and are negative.    --------------------------------------------- PAST HISTORY ---------------------------------------------  Past Medical History:  has a past medical history of History of pulmonary embolism, Macular degeneration, and Metabolic syndrome. Past Surgical History:  has a past surgical history that includes hernia repair; Colonoscopy (07/15/2003); polypectomy; and other surgical history (02/19/2019). Social History:  reports that he quit smoking about 20 months ago. His smoking use included cigarettes. He started smoking about 66 years ago. He has never used smokeless tobacco. He reports that he does not drink alcohol or use drugs. Family History: family history is not on file. The patients home medications have been reviewed. Allergies: Patient has no known allergies. ---------------------------------------------------PHYSICAL EXAM--------------------------------------    Constitutional/General: Alert and oriented to person and place, but not time.  Minimal distress  Head: Normocephalic and atraumatic  Eyes: PERRL, EOMI, conjunctiva normal, sclera non icteric  Mouth: Oropharynx clear  Neck: Non-tender upon palpation. Pain upon rotation of head to the right. Respiratory: Lungs clear to auscultation bilaterally, no wheezes, rales, or rhonchi. Not in respiratory distress  Cardiovascular:  Regular rate. Regular rhythm. No murmurs, gallops, or rubs. 2+ distal pulses  Chest: No chest wall tenderness  GI:  Abdomen Soft, Non tender, Non distended. +BS. No rebound, guarding, or rigidity. No pulsatile masses. Musculoskeletal: ROM of upper extremities is intact. Limited ROM of lower extremities secondary to weakness. Edema present to both legs. Distal capillary refill takes less than 2 seconds. PT and DP pulses are 2+ bilaterally. Distal sensation and motor intact. No clubbing or cyanosis. Integument: skin warm and dry. No rashes. Neurologic: GCS 14 (Verbal). No focal deficits, symmetric strength 5/5 in the upper , Moves lower extremity but weaker  Psychiatric: Normal Affect    -------------------------------------------------- RESULTS -------------------------------------------------  I have personally reviewed all laboratory and imaging results for this patient. Results are listed below.      LABS:  Results for orders placed or performed during the hospital encounter of 05/25/19   CBC   Result Value Ref Range    WBC 4.5 4.5 - 11.5 E9/L    RBC 3.50 (L) 3.80 - 5.80 E12/L    Hemoglobin 10.1 (L) 12.5 - 16.5 g/dL    Hematocrit 32.8 (L) 37.0 - 54.0 %    MCV 93.7 80.0 - 99.9 fL    MCH 28.9 26.0 - 35.0 pg    MCHC 30.8 (L) 32.0 - 34.5 %    RDW 15.0 11.5 - 15.0 fL    Platelets 712 326 - 029 E9/L    MPV 10.2 7.0 - 12.0 fL   Comprehensive Metabolic Panel   Result Value Ref Range    Sodium 144 132 - 146 mmol/L    Potassium 4.1 3.5 - 5.0 mmol/L    Chloride 108 (H) 98 - 107 mmol/L    CO2 28 22 - 29 mmol/L    Anion Gap 8 7 - 16 mmol/L    Glucose 94 74 - 99 mg/dL    BUN 25 (H) 8 - 23 mg/dL    CREATININE 1.3 (H) 0.7 - 1.2 mg/dL    GFR Non-African American >60 >=60 mL/min/1.73    GFR African American >60     Calcium 9.1 8.6 - 10.2 mg/dL    Total Protein 6.9 6.4 - 8.3 g/dL    Alb 3.8 3.5 - 5.2 g/dL    Total Bilirubin 0.7 0.0 - 1.2 mg/dL    Alkaline Phosphatase 66 40 - 129 U/L    ALT 17 0 - 40 U/L    AST 21 0 - 39 U/L   Troponin   Result Value Ref Range    Troponin <0.01 0.00 - 0.03 ng/mL   Protime-INR   Result Value Ref Range    Protime 14.5 (H) 9.3 - 12.4 sec    INR 1.3    Lipase   Result Value Ref Range    Lipase 17 13 - 60 U/L   Lactic Acid, Plasma   Result Value Ref Range    Lactic Acid 1.1 0.5 - 2.2 mmol/L   Urinalysis   Result Value Ref Range    Color, UA Yellow Straw/Yellow    Clarity, UA Clear Clear    Glucose, Ur Negative Negative mg/dL    Bilirubin Urine Negative Negative    Ketones, Urine Negative Negative mg/dL    Specific Gravity, UA 1.010 1.005 - 1.030    Blood, Urine Negative Negative    pH, UA 7.0 5.0 - 9.0    Protein, UA Negative Negative mg/dL    Urobilinogen, Urine 1.0 <2.0 E.U./dL    Nitrite, Urine Negative Negative    Leukocyte Esterase, Urine Negative Negative       RADIOLOGY:  Interpreted by Radiologist.  CT Head WO Contrast   Final Result   Chronic microvascular infarcts and mild generalized volume loss. This report has been electronically signed by Solomon Brooks MD.      CT Cervical Spine WO Contrast   Final Result   1. Negative for fracture or dislocation. 2. Degenerative spondylosis       This report has been electronically signed by Solomon Brooks MD.      CT Lumbar Spine WO Contrast   Final Result   1. Negative for fracture or dislocation. 2. Degenerative spondylosis       This report has been electronically signed by Solomon Brooks MD.      XR CHEST PORTABLE   Final Result      No airspace opacities or pleural effusion. EKG:  This EKG is signed and interpreted by the EP.     Time: 2223  Rate: 56  Rhythm: Atrial fibrillation  Interpretation: A-fib  Comparison: stable as compared to patient's most recent EKG on 04/11/2019    ------------------------- NURSING NOTES AND VITALS REVIEWED ---------------------------   The nursing notes within the ED encounter and vital signs as below have been reviewed by myself. /63   Pulse 60   Temp 99.8 °F (37.7 °C) (Oral)   Resp 14   Ht 6' 3\" (1.905 m)   Wt 222 lb (100.7 kg)   SpO2 96%   BMI 27.75 kg/m²   Oxygen Saturation Interpretation: Normal    The patients available past medical records and past encounters were reviewed. ------------------------------ ED COURSE/MEDICAL DECISION MAKING----------------------  Medications   0.9 % sodium chloride infusion ( Intravenous New Bag 5/25/19 5286)       Medical Decision Making: This patient's ED course included: a personal history and physicial examination and re-evaluation prior to disposition    This patient has been closely monitored during their ED course. Re-Evaluations:           Re-evaluation. Patients symptoms show no change  rechecked and in no acute distress reporting  No chest pain or difficulty breathing or abdominal pain  Consultations: To internal medicine and patient will be admitted  Counseling: The emergency provider has spoken with the patient and discussed todays results, in addition to providing specific details for the plan of care and counseling regarding the diagnosis and prognosis. Questions are answered at this time and they are agreeable with the plan.     --------------------------------- IMPRESSION AND DISPOSITION ---------------------------------    IMPRESSION  1. Strain of lumbar region, initial encounter    2.  Generalized weakness        DISPOSITION  Disposition: Admit to telemetry  Patient condition is fair    5/25/19, 9:59 PM.    This note is prepared by Shay Haro, acting as Scribe for Kristina Gorman MD.    Kristian Gorman MD:  The scribe's documentation has been prepared under my

## 2019-05-26 NOTE — H&P
Hospital Medicine History & Physical      PCP: Isaac Garrett DO    Date of Admission: 5/25/2019    Date of Service: 5-26-19    Chief Complaint:  BACK PAIN Sarthak Pierre    History Of Present Illness:    80 y.o. male  HX AFIB, DEMENTIA HTN PE presenting to the ED for back pain, beginning this morning. Other symptoms include thigh/knee pain. EMS states pt normally walks at home, but has experienced decreased mobility due to pain. Patient reportedly weaker than normal.. There is no history of incontinence. EMS also states the patient fell two days ago and hit his head. Patient does complain of lower back pain. However, they are unsure if he ever received medical treatment. Patient reporting no abdominal pain or  Chest pain or vomiting or shortness of breath. Labs hb 10.1, trop negative, ekg AFIB chronic, UA negative , ct head ct cervical spine ct lumbar spine all negative for acute pathology, cxr clear, being admitted for pain control , PT/OT eval for weakness     Past Medical History:          Diagnosis Date    History of pulmonary embolism     WITH RIGHT VENTRICULAR INFARCTION    Macular degeneration     Metabolic syndrome        Past Surgical History:          Procedure Laterality Date    COLONOSCOPY  07/15/2003    HERNIA REPAIR      OTHER SURGICAL HISTORY  02/19/2019    Dr. Paola Coombs- IVC Filter    POLYPECTOMY      X 2       Medications Prior to Admission:      Prior to Admission medications    Medication Sig Start Date End Date Taking?  Authorizing Provider   potassium chloride (KLOR-CON M) 20 MEQ extended release tablet Take 20 mEq by mouth 2 times daily    Historical Provider, MD   furosemide (LASIX) 20 MG tablet Take 20 mg by mouth 2 times daily    Historical Provider, MD   donepezil (ARICEPT) 10 MG tablet Take 1 tablet by mouth nightly 11/30/18   Brando Quintanilla DO   memantine (NAMENDA) 10 MG tablet Take 1 tablet by mouth 2 times daily 11/30/18   Brando Quintanilla DO   oxybutynin (DITROPAN-XL) 10 MG extended release tablet Take 1 tablet by mouth daily 11/30/18   Rodrick Cabrera, DO   meloxicam (MOBIC) 7.5 MG tablet Take 1 tablet by mouth daily 11/13/18   Rodrick Cabrera, DO   amLODIPine (NORVASC) 2.5 MG tablet Take 1 tablet by mouth daily 11/2/18   Rodrick Cabrera, DO   aspirin 81 MG tablet Take 81 mg by mouth daily    Historical Provider, MD       Allergies:  Patient has no known allergies. Social History:      The patient currently lives AT HOME    TOBACCO:   reports that he quit smoking about 20 months ago. His smoking use included cigarettes. He started smoking about 66 years ago. He has never used smokeless tobacco.  ETOH:   reports that he does not drink alcohol. Family History:      Reviewed in detail and negative for DM, CAD, Cancer, CVA. Positive as follows:    History reviewed. No pertinent family history. REVIEW OF SYSTEMS:   Pertinent positives as noted in the HPI. All other systems reviewed and negative. PHYSICAL EXAM PERFORMED:    BP (!) 174/84   Pulse 60   Temp 97.5 °F (36.4 °C) (Temporal)   Resp 19   Ht 6' 3\" (1.905 m)   Wt 222 lb (100.7 kg)   SpO2 100%   BMI 27.75 kg/m²     Constitutional/General: Alert and oriented to person and place, but not time. Minimal distress  Head: Normocephalic and atraumatic  Eyes: PERRL, EOMI, conjunctiva normal, sclera non icteric  Mouth: Oropharynx clear  Neck: Non-tender upon palpation. Pain upon rotation of head to the right. Respiratory: Lungs clear to auscultation bilaterally, no wheezes, rales, or rhonchi. Not in respiratory distress  Cardiovascular:  irregular rate. Regular rhythm. No murmurs, gallops, or rubs. 2+ distal pulses  GI:  Abdomen Soft, Non tender, Non distended. +BS. No rebound, guarding, or rigidity. No pulsatile masses. Musculoskeletal: ROM of upper extremities is intact. Limited ROM of lower extremities secondary to weakness. Edema present to both legs. Distal capillary refill takes less than 2 seconds.   PT and DP decreased mobility due to pain. Patient reportedly weaker than normal.. There is no history of incontinence. EMS also states the patient fell two days ago and hit his head. Patient does complain of lower back pain. However, they are unsure if he ever received medical treatment. Patient reporting no abdominal pain or  Chest pain or vomiting or shortness of breath. Labs hb 10.1, trop negative, ekg AFIB chronic, UA negative , ct head ct cervical spine ct lumbar spine all negative for acute pathology, cxr clear, being admitted for pain control , PT/OT eval for weakness , bcx taken    WEAKNESS/FALL/BACK PAIN  HX AFIB not on AC, but on asa  HTN  DEMENTIA  CHRONIC ANEMIA  CONSTIPATION    Admit  Pain control  PT/OT  HOME MEDS/  BM regimen      DVT Prophylaxis: lovenox  Diet: No diet orders on file  Code Status: Prior    PT/OT Eval Status: ordered    Dispo - ip       Sailaja Ramos MD    Thank you Guru Crow DO for the opportunity to be involved in this patient's care. If you have any questions or concerns please feel free to contact me at [phone].

## 2019-05-27 NOTE — PROGRESS NOTES
Pt had a 2.1 second pause,  [facility] for [employer] was notified.

## 2019-05-27 NOTE — PLAN OF CARE
Problem: Falls - Risk of:  Goal: Will remain free from falls  Description  Will remain free from falls  5/27/2019 0650 by Alethea Oropeza RN  Outcome: Met This Shift  5/26/2019 1723 by Ivett James RN  Outcome: Met This Shift  Goal: Absence of physical injury  Description  Absence of physical injury  5/27/2019 0650 by Alethea Oropeza RN  Outcome: Met This Shift  5/26/2019 1723 by Ivett James RN  Outcome: Met This Shift     Problem: Risk for Impaired Skin Integrity  Goal: Tissue integrity - skin and mucous membranes  Description  Structural intactness and normal physiological function of skin and  mucous membranes.   5/27/2019 0650 by Alethea Oropeza RN  Outcome: Met This Shift  5/26/2019 1723 by Ivett James RN  Outcome: Met This Shift     Problem: Skin Integrity:  Goal: Will show no infection signs and symptoms  Description  Will show no infection signs and symptoms  5/27/2019 0650 by Alethea Oropeza RN  Outcome: Met This Shift  5/26/2019 1723 by Ivett James RN  Outcome: Met This Shift  Goal: Absence of new skin breakdown  Description  Absence of new skin breakdown  5/27/2019 0650 by Alethea Oropeza RN  Outcome: Met This Shift  5/26/2019 1723 by Ivett James RN  Outcome: Met This Shift     Problem: Pain:  Goal: Pain level will decrease  Description  Pain level will decrease  5/27/2019 0650 by Alethea Oropeza RN  Outcome: Met This Shift  5/26/2019 1723 by Ivett James RN  Outcome: Met This Shift  Goal: Control of acute pain  Description  Control of acute pain  5/27/2019 0650 by Alethea Oropeza RN  Outcome: Met This Shift  5/26/2019 1723 by Ivett James RN  Outcome: Met This Shift  Goal: Control of chronic pain  Description  Control of chronic pain  5/27/2019 0650 by Alethea Oropeza RN  Outcome: Met This Shift  5/26/2019 1723 by Ivett James RN  Outcome: Met This Shift

## 2019-05-27 NOTE — PLAN OF CARE
Problem: Falls - Risk of:  Goal: Will remain free from falls  Description  Will remain free from falls  5/27/2019 1023 by Efrain Anderson RN  Outcome: Met This Shift  5/27/2019 0650 by Julia Brandon RN  Outcome: Met This Shift  Goal: Absence of physical injury  Description  Absence of physical injury  5/27/2019 1023 by Efrain Anderson RN  Outcome: Met This Shift  5/27/2019 0650 by Julia Brandon RN  Outcome: Met This Shift     Problem: Risk for Impaired Skin Integrity  Goal: Tissue integrity - skin and mucous membranes  Description  Structural intactness and normal physiological function of skin and  mucous membranes.   5/27/2019 1023 by Efrain Anderson RN  Outcome: Met This Shift  5/27/2019 0650 by Julia Brandon RN  Outcome: Met This Shift     Problem: Skin Integrity:  Goal: Will show no infection signs and symptoms  Description  Will show no infection signs and symptoms  5/27/2019 1023 by Efrain Anderson RN  Outcome: Met This Shift  5/27/2019 0650 by Julia Brandon RN  Outcome: Met This Shift  Goal: Absence of new skin breakdown  Description  Absence of new skin breakdown  5/27/2019 1023 by Efrain Anderson RN  Outcome: Met This Shift  5/27/2019 0650 by Julia Brandon RN  Outcome: Met This Shift     Problem: Pain:  Goal: Pain level will decrease  Description  Pain level will decrease  5/27/2019 0650 by Julia Brandon RN  Outcome: Met This Shift  Goal: Control of acute pain  Description  Control of acute pain  5/27/2019 0650 by Julia Brandon RN  Outcome: Met This Shift  Goal: Control of chronic pain  Description  Control of chronic pain  5/27/2019 0650 by Julia Brandon RN  Outcome: Met This Shift

## 2019-05-27 NOTE — PROGRESS NOTES
Hospitalist Progress Note      PCP: Magaly Anand DO    Date of Admission: 5/25/2019      Subjective:   Eating   Alert, no distress  No pain      Medications:  Reviewed    Infusion Medications   Scheduled Medications    amLODIPine  2.5 mg Oral Daily    aspirin  81 mg Oral Daily    donepezil  10 mg Oral Nightly    furosemide  20 mg Oral BID    oxybutynin  10 mg Oral Daily    memantine  10 mg Oral BID    potassium chloride  20 mEq Oral BID    sodium chloride flush  10 mL Intravenous 2 times per day    enoxaparin  40 mg Subcutaneous Daily     PRN Meds: sodium chloride flush, magnesium hydroxide, ondansetron, acetaminophen, traMADol **OR** [DISCONTINUED] traMADol      Intake/Output Summary (Last 24 hours) at 5/27/2019 0950  Last data filed at 5/27/2019 0916  Gross per 24 hour   Intake 1140 ml   Output 900 ml   Net 240 ml       Physical Exam Performed:    BP (!) 158/72   Pulse 51   Temp 97.6 °F (36.4 °C) (Temporal)   Resp 18   Ht 6' 3\" (1.905 m)   Wt 208 lb 11.2 oz (94.7 kg)   SpO2 100%   BMI 26.09 kg/m²     Constitutional/General: Alert and oriented to person and place, but not time. Minimal distress  Head: Normocephalic and atraumatic  Eyes: PERRL, EOMI, conjunctiva normal, sclera non icteric  Mouth: Oropharynx clear  Neck: Non-tender upon palpation. Pain upon rotation of head to the right. Respiratory: Lungs clear to auscultation bilaterally, no wheezes, rales, or rhonchi. Not in respiratory distress  Cardiovascular:  irregular rate. Regular rhythm. No murmurs, gallops, or rubs. 2+ distal pulses  GI:  Abdomen Soft, Non tender, Non distended.  +BS.   No rebound, guarding, or rigidity. No pulsatile masses. Musculoskeletal: ROM of upper extremities is intact. Limited ROM of lower extremities secondary to weakness. Edema present to both legs. Distal capillary refill takes less than 2 seconds.  PT and DP pulses are 2+ bilaterally.  Distal sensation and motor intact.  No clubbing or cyanosis.   Integument: skin warm and dry. No rashes. Neurologic: GCS 14 (Verbal). No focal deficits, symmetric strength 5/5 in the upper , Moves lower extremity but weaker  Psychiatric: Normal Affect          Labs:   Recent Labs     05/25/19 2254 05/27/19  0645   WBC 4.5 3.9*   HGB 10.1* 10.5*   HCT 32.8* 33.5*    151     Recent Labs     05/25/19 2254 05/27/19  0645    141   K 4.1 3.8   * 104   CO2 28 24   BUN 25* 18   CREATININE 1.3* 1.0   CALCIUM 9.1 8.9     Recent Labs     05/25/19  2254   AST 21   ALT 17   BILITOT 0.7   ALKPHOS 66     Recent Labs     05/25/19 2254   INR 1.3     Recent Labs     05/25/19  2254   Margaretann Boots <0.01       Urinalysis:      Lab Results   Component Value Date    NITRU Negative 05/26/2019    WBCUA 0-1 04/05/2019    BACTERIA FEW 04/05/2019    RBCUA 1-3 04/05/2019    RBCUA NONE 11/08/2012    BLOODU Negative 05/26/2019    SPECGRAV 1.010 05/26/2019    GLUCOSEU Negative 05/26/2019       Radiology:  CT Head WO Contrast   Final Result   Chronic microvascular infarcts and mild generalized volume loss. This report has been electronically signed by Eneida Shin MD.      CT Cervical Spine WO Contrast   Final Result   1. Negative for fracture or dislocation. 2. Degenerative spondylosis       This report has been electronically signed by Eneida Shin MD.      CT Lumbar Spine WO Contrast   Final Result   1. Negative for fracture or dislocation. 2. Degenerative spondylosis       This report has been electronically signed by Eneida Shin MD.      XR CHEST PORTABLE   Final Result      No airspace opacities or pleural effusion. Assessment/Plan:    Active Hospital Problems    Diagnosis    Generalized weakness [R53.1]       80 y. o. male  HX AFIB, DEMENTIA HTN PE presenting to the ED for back pain, beginning this morning.   Other symptoms include thigh/knee pain.  EMS states pt normally walks at home, but has experienced decreased mobility due to

## 2019-05-28 NOTE — PROGRESS NOTES
All discharge paperwork reviewed with patient and patient's , including new medications which a prescription was provided and important follow up visit. Patient has no further questions at this time.

## 2019-05-28 NOTE — DISCHARGE SUMMARY
Hospitalist Discharge Summary    Patient ID: Sean Aparicio   Patient : [id number]  Patient's PCP: Guru Crow DO    Admit Date: 2019   Admitting Physician: Sailaja Ramos MD    Discharge Date:  2019  Discharge Physician: Allison Landers MD   Discharge Condition: Stable  Discharge Disposition: Home    History of presenting illness:    80 y. o. male  HX AFIB, DEMENTIA HTN PE presenting to the ED for back pain, beginning this morning.   Other symptoms include thigh/knee pain. EMS states pt normally walks at home, but has experienced decreased mobility due to pain. Patient reportedly weaker than normal.. There is no history of incontinence. EMS also states the patient fell two days ago and hit his head. Patient does complain of lower back pain. However, they are unsure if he ever received medical treatment. Patient reporting no abdominal pain or  Chest pain or vomiting or shortness of breath. Labs hb 10.1, trop negative, ekg AFIB chronic, UA negative , ct head ct cervical spine ct lumbar spine all negative for acute pathology, cxr clear, being admitted for pain control , PT/OT eval for weakness     Hospital course in brief:  (Please refer to daily progress notes for a comprehensive review of the hospitalization by requesting medical records)    Admitted for further evaluation. Cardio evaluation  PT and OT evaluation  Home with home health. No change in cardiac medications    Consults:   IP CONSULT TO INTERNAL MEDICINE  IP CONSULT TO CARDIOLOGY  IP CONSULT TO IV TEAM  IP CONSULT TO HOME CARE NEEDS    Discharge Diagnoses:    History of false  Weakness  History of A. fib on aspirin  Hypertension  Dementia  Chronic anemia  Constipation  Pauses on cardiac telemetry  History of prostate CA  Generalized debility  Generalized fatigue    Discharge Instructions / Follow up:    Consider placement. Patient's family would not have it. Patient was discharged back to home with home health.     Activity: activity as tolerated    Significant labs:  CBC:   Recent Labs     05/25/19 2254 05/27/19  0645   WBC 4.5 3.9*   RBC 3.50* 3.64*   HGB 10.1* 10.5*   HCT 32.8* 33.5*   MCV 93.7 92.0   RDW 15.0 14.6    151     BMP:   Recent Labs     05/25/19 2254 05/27/19 0645    141   K 4.1 3.8   * 104   CO2 28 24   BUN 25* 18   CREATININE 1.3* 1.0   MG  --  2.0     LFT:  Recent Labs     05/25/19 2254   PROT 6.9   ALKPHOS 66   ALT 17   AST 21   BILITOT 0.7   LIPASE 17     PT/INR:   Recent Labs     05/25/19 2254   INR 1.3     BNP: No results for input(s): BNP in the last 72 hours. Hgb A1C:   Lab Results   Component Value Date    LABA1C 5.2 09/19/2017     Folate and B12: No results found for: Ginna Bermeo, No results found for: FOLATE  Thyroid Studies:   Lab Results   Component Value Date    TSH 1.930 09/02/2018       Urinalysis:    Lab Results   Component Value Date    NITRU Negative 05/26/2019    WBCUA 0-1 04/05/2019    BACTERIA FEW 04/05/2019    RBCUA 1-3 04/05/2019    RBCUA NONE 11/08/2012    BLOODU Negative 05/26/2019    SPECGRAV 1.010 05/26/2019    GLUCOSEU Negative 05/26/2019       Imaging:  Ct Head Wo Contrast    Result Date: 5/26/2019  EXAM:  CT Head Without Contrast EXAM DATE/TIME:  5/25/2019 10:00 PM CLINICAL HISTORY:  80years old, male; Pain; Headache; Additional info: Weakness TECHNIQUE:  Imaging protocol: Axial computed tomography images of the head without contrast. Coronal and sagittal reformatted images were created and reviewed. Radiation optimization: All CT scans at this facility use at least one of these dose optimization techniques: automated exposure control; mA and/or kV adjustment per patient size (includes targeted exams where dose is matched to clinical indication); or iterative reconstruction.  COMPARISON:  CT HEAD WO CONTRAST 4/5/2019 10:06 AM FINDINGS:  Brain: Patchy hypodensities in subcortical white matter tracts of hemispheres and basal gangliaconsistent with chronic microvascular infarcts/ischemic changes. Additional patchy hypodensities in basal ganglia. Negative for hemorrhage or extra axial collections. Minimally enlarged sulci in the cerebral hemispheres. Ventricles: Ventricles are minimally enlarged with proportional temporal horns. Bones/joints: Unremarkable. No acute fracture. Sinuses: Visualized sinuses are unremarkable. No fluid levels. Mastoid air cells: Visualized mastoid air cells are well aerated. No mastoid effusion. Soft tissues: Unremarkable. Chronic microvascular infarcts and mild generalized volume loss. This report has been electronically signed by Jami Fenton MD.    Ct Cervical Spine Wo Contrast    Result Date: 5/26/2019  EXAM:  CT Cervical Spine Without Contrast EXAM DATE/TIME:  5/25/2019 10:00 PM CLINICAL HISTORY:  80years old, male; Neck pain TECHNIQUE:  Imaging protocol: Axial computed tomography images of the cervical spine without contrast. Coronal and sagittal reformatted images were created and reviewed. Radiation optimization: All CT scans at this facility use at least one of these dose optimization techniques: automated exposure control; mA and/or kV adjustment per patient size (includes targeted exams where dose is matched to clinical indication); or iterative reconstruction. COMPARISON:  CT CERVICAL SPINE WO CONTRAST 10/8/2018 5:45 PM FINDINGS:   ALIGNMENT/FRACTURES: Anatomic. Negative for fracture or dislocation. VERTEBRAL BODIES:   Heights: normal.   Mineralization: Osteopenia and. Degenerative spondylosis: Age appropriate. LEVELS:   FORAMEN MAGNUM AND OCCIPITAL CONDYLES: Normal   C1-2: Normal articulations   C2-3: No Stenoses   C3-4: Mild to moderate bilateral foraminal stenoses. Mild central stenosis. C4-5: No Stenoses   C5-6: No Stenoses   C6-7: No Stenoses   C7-T1: No Stenoses   PARASPINAL MUSCLES: normal.   SOFT TISSUES OF THE NECK: normal.     1. Negative for fracture or dislocation.  2. Degenerative spondylosis This report has been electronically signed by Liz Santamaria MD.    Ct Lumbar Spine Wo Contrast    Result Date: 2019  EXAM:  CT Lumbar Spine Without Contrast EXAM DATE/TIME:  2019 10:00 PM CLINICAL HISTORY:  80years old, male; Low back pain TECHNIQUE:  Imaging protocol: Axial computed tomography images of the lumbar spine without intravenous contrast. Coronal and sagittal reformatted images were created and reviewed. Radiation optimization: All CT scans at this facility use at least one of these dose optimization techniques: automated exposure control; mA and/or kV adjustment per patient size (includes targeted exams where dose is matched to clinical indication); or iterative reconstruction. COMPARISON:  No relevant prior studies available. FINDINGS:   ALIGNMENT: normal.   VERTEBRAL BODIES:   Heights: normal.   Mineralization: normal.   LEVELS: Degenerative Spondylosis: Multilevel degenerative spondylosis characterized by disk degeneration with annular bulging, and facet arthropathy. T12-L1: No stenoses   L1-2: No stenoses   L2-3: No stenoses   L3-4: No stenoses   L4-5: No stenoses   L5-S1: Mild to moderate bilateral foraminal stenoses   PARASPINAL MUSCLES: normal.   RETROPERITONEUM: IVC filter. VISUALIZED SACRUM AND SACROILIAC JOINTS: normal.     1. Negative for fracture or dislocation. 2. Degenerative spondylosis This report has been electronically signed by Liz Santamaria MD.    Xr Chest Portable    Result Date: 2019  Patient MRN:  [id number] : 1932 Age: 80 years Gender: Male Order Date:  2019 10:00 PM EXAM: XR CHEST PORTABLE COMPARISON: 2019 INDICATION:  ams ams acute mental status change FINDINGS: The heart is normal in size. No focal airspace opacity. There is no pleural effusion. There is no pneumothorax. No free air beneath diaphragm. No airspace opacities or pleural effusion.        Discharge Medications:      Medication List      CONTINUE taking these medications    amLODIPine 2.5 MG

## 2019-05-28 NOTE — CARE COORDINATION
Spoke with Pt, Cassidy Preciado about [facility] and Guardianship. SW explained that a person with advanced dementia can not sign for [facility]. Family disagrees with advanced dementia. SW advised to contact primary DR and discuss it with them. Step Granddaughter moved into Pt's home and is trying to take over the care of Pt and his wife. Pt does not want this step granddaughter living at his home or taking over his care. Cassidy Preciado has services set up with [facility] for in-home services and have spoken to [employer] about in - home services. They were requesting wheelchair and wheeled walker with a seat. Pt already has a wheeled walker at home. Kirstin Preciado and Angela Huddleston does not know who paid for those items. SW recommended that they find out who paid for them then to approach the other one to see about getting wheeled walker with a seat. [facility] Plan [facility]. Pt hs wheelchair, wheeled walker ,and hospital bed at home.

## 2019-05-28 NOTE — PROGRESS NOTES
Occupational Therapy Attempt     OT orders received. Chart reviewed. Attempted OT eval this PM. Moni Cover present. Pt refusing to participate because he believes \"that the therapist is making him sign something regarding home services\". Educated pt on what OT is and in regards to services while in hospital and benefits of therapy. Background information obtained. Will re-attempt eval at a later time. Home Living: Pt lives with wife and granddaughter in a 2 story home with 6 step(s) to enter and 1 rail(s); bed on main floor and bath on 2nd floor (6 steps up). Bathroom setup: grab bars, tub/shower unit   Equipment owned: shower chair  Prior Level of Function: assist with ADLs. Family completes laundry/cleaning. Has meals on wheels. ; using w/c, ww, cane for functional mobility   Driving: israel Santiago, [address] [place], OTR/L [id number]

## 2019-05-28 NOTE — CARE COORDINATION
Met with pt and family at bedside (son and cousin) to discuss discharge planning / transition of care. Per son Fany Gaston he has been working with the [place] since last week to obtain [facility] and they will be visiting the pt today or tomorrow and have been notified pt discharged today. Anny provided POA,  guardianship paper work, and answered family questions relating to the aforementioned. Family was requesting [facility] however, they just realized he has one already, Anny is checking to see if there are any other DME needs currently. Son to provide transportation home after approximately 2pm (picking his daughter up) via phone [phone].

## 2019-05-28 NOTE — CONSULTS
[facility] [address] [place] [address]    Name: Cornelius Edwards    Age: 80 y.o. Date of Admission: 5/25/2019  9:54 PM    Date of Service: 5/28/2019    Reason for Consultation: 2.1 second pause    Referring Physician: Dr Saran Rosas  Primary Care Physician: Heidi Gerardo DO    History of Present Illness: The patient is a 80y.o. year old male with dementia admitted for falls and weakness. Noted tohave some bradycardia into the 40's overnight and overnight some short pauses nothing over 3 seconds. Patient has no complaints- family present state he and wife are about same and cannot take care of themselves. Patient seen last Sept 2018 for arrhythmia- never any clear documentation of any significant pauses, no afib, SVT or VT - does have PACs PVC sometime difficult to see P waves due to artifact. Had echo 9/19  EF 55-60% moderate LVH, LAE, aortic sclerosis. Lexiscan 6/12/2018 ( [facility]) EF 60% no ischemia. Past Medical History:   Past Medical History:   Diagnosis Date    History of pulmonary embolism     WITH RIGHT VENTRICULAR INFARCTION    Macular degeneration     Metabolic syndrome        Review of Systems:   Constitutional: No fever, chills, sweats  Cardiac: As per HPI  Pulmonary: No cough, wheeze, hemoptysis  HEENT: No visual disturbances, difficult swallowing  GI: No nausea, vomiting, diarrhea, abdominal pain, rectal bleeding  : No dysuria or hematuria  Endocrine: No excessive thirst, heat or cold intolerance. Musculoskeletal: No joint pain or muscle aches. No claudication  Skin: No skin breakdown or rashes  Neuro: No headache, confusion, or seizures  Psych: No depression, anxiety    Family History:  History reviewed. No pertinent family history.     Social History:  Social History     Socioeconomic History    Marital status:      Spouse name: Not on file    Number of children: Not on file    Years of education: Not on file    Highest education level: Not on file Occupational History    Not on file   Social Needs    Financial resource strain: Not on file    Food insecurity:     Worry: Not on file     Inability: Not on file    Transportation needs:     Medical: Not on file     Non-medical: Not on file   Tobacco Use    Smoking status: Former Smoker     Types: Cigarettes     Start date: 1953     Last attempt to quit: 2017     Years since quittin.6    Smokeless tobacco: Never Used   Substance and Sexual Activity    Alcohol use: No     Comment: occasional    Drug use: No    Sexual activity: Not on file   Lifestyle    Physical activity:     Days per week: Not on file     Minutes per session: Not on file    Stress: Not on file   Relationships    Social connections:     Talks on phone: Not on file     Gets together: Not on file     Attends Taoist service: Not on file     Active member of club or organization: Not on file     Attends meetings of clubs or organizations: Not on file     Relationship status: Not on file    Intimate partner violence:     Fear of current or ex partner: Not on file     Emotionally abused: Not on file     Physically abused: Not on file     Forced sexual activity: Not on file   Other Topics Concern    Not on file   Social History Narrative    Not on file       Allergies:  No Known Allergies    Home Medications:  Prior to Admission medications    Medication Sig Start Date End Date Taking?  Authorizing Provider   potassium chloride (KLOR-CON M) 20 MEQ extended release tablet Take 20 mEq by mouth 2 times daily    Historical Provider, MD   furosemide (LASIX) 20 MG tablet Take 20 mg by mouth 2 times daily    Historical Provider, MD   donepezil (ARICEPT) 10 MG tablet Take 1 tablet by mouth nightly 18   Magaly Anand DO   memantine (NAMENDA) 10 MG tablet Take 1 tablet by mouth 2 times daily 18   Magaly Anand DO   oxybutynin (DITROPAN-XL) 10 MG extended release tablet Take 1 tablet by mouth daily 18   Brando HORTON DO Dwight   meloxicam (MOBIC) 7.5 MG tablet Take 1 tablet by mouth daily 11/13/18   [facility] DO   amLODIPine (NORVASC) 2.5 MG tablet Take 1 tablet by mouth daily 11/2/18   [facility], DO   aspirin 81 MG tablet Take 81 mg by mouth daily    Historical Provider, MD       Current Medications:  Current Facility-Administered Medications   Medication Dose Route Frequency Provider Last Rate Last Dose    amLODIPine (NORVASC) tablet 2.5 mg  2.5 mg Oral Daily Primo Holloway MD   2.5 mg at 05/28/19 1019    aspirin chewable tablet 81 mg  81 mg Oral Daily Primo Holloway MD   81 mg at 05/28/19 1019    donepezil (ARICEPT) tablet 10 mg  10 mg Oral Nightly Primo Holloway MD   10 mg at 05/27/19 2013    furosemide (LASIX) tablet 20 mg  20 mg Oral BID Primo Holloway MD   20 mg at 05/28/19 1019    oxybutynin (DITROPAN-XL) extended release tablet 10 mg  10 mg Oral Daily Primo Holloway MD   10 mg at 05/28/19 1019    memantine (NAMENDA) tablet 10 mg  10 mg Oral BID Primo Holloway MD   10 mg at 05/28/19 1019    potassium chloride (KLOR-CON M) extended release tablet 20 mEq  20 mEq Oral BID Primo Holloway MD   20 mEq at 05/28/19 1019    sodium chloride flush 0.9 % injection 10 mL  10 mL Intravenous 2 times per day Primo Holloway MD   10 mL at 05/28/19 1019    sodium chloride flush 0.9 % injection 10 mL  10 mL Intravenous PRN Primo Holloway MD        magnesium hydroxide (MILK OF MAGNESIA) 400 MG/5ML suspension 30 mL  30 mL Oral Daily PRN Primo Holloway MD        ondansetron (ZOFRAN) injection 4 mg  4 mg Intravenous Q6H PRN Primo Holloway MD        enoxaparin (LOVENOX) injection 40 mg  40 mg Subcutaneous Daily Primo Holloway MD   40 mg at 05/28/19 1019    acetaminophen (TYLENOL) tablet 650 mg  650 mg Oral Q4H PRN Juanito Monroy, DO        traMADol Michael Brown) tablet 25 mg  25 mg Oral Q6H PRN Daylin Vickey, DO             Physical Exam:  /64   Pulse 66   Temp 97.2 °F (36.2 °C) (Temporal)   Resp 18 Ht 6' 3\" (1.905 m)   Wt 210 lb 3.2 oz (95.3 kg)   SpO2 99%   BMI 26.27 kg/m²   Weight change: 1 lb 8 oz (0.68 kg)  Wt Readings from Last 3 Encounters:   05/28/19 210 lb 3.2 oz (95.3 kg)   04/05/19 200 lb (90.7 kg)   02/17/19 206 lb (93.4 kg)     General: Awake, alert, poor historian, no acute distress  HEENT: Normocephalic and atraumatic, extraocular movements intact, pupils equal and round, moist mucus membranes, sclera anicteric  Neck: No JVD, no carotid bruits, no thyromegaly, no adenopathy  Cardiac: Regular rate and rhythm, normal S1 and physiologically split S2, no S3, no S4. Apical impulse is nondisplaced. No murmurs, no pericardial rubs, no clicks. Carotid upstrokes brisk. Respiratory: Clear bilaterally; no wheezes, no rales, no rhonchi. Unlabored respirations  Abdomen: Soft, nontender, nondistended, bowel sounds+, no hepatomegaly, no masses, no abdominal bruits  Extremities: No edema, wrinkled feet/ankles no cyanosis, no clubbing. Distal pulses intact  Skin: Intact, warm and dry, no rashes, no breakdown  Musculoskeletal: normal tone and strength in the upper and lower extremities bilaterally  Neuro: No focal deficits. Moves all extremities appropriately to command.   Normal sensation in the upper and lower extremities bilaterally  Psychiatric: Cooperative, and normal affect    Intake/Output:    Intake/Output Summary (Last 24 hours) at 5/28/2019 1107  Last data filed at 5/28/2019 0946  Gross per 24 hour   Intake 540 ml   Output 575 ml   Net -35 ml     I/O this shift:  In: 180 [P.O.:180]  Out: -     Laboratory Tests:  Last 3 CBC:  Recent Labs     05/25/19  2254 05/27/19  0645   WBC 4.5 3.9*   RBC 3.50* 3.64*   HGB 10.1* 10.5*   HCT 32.8* 33.5*   MCV 93.7 92.0   MCH 28.9 28.8   MCHC 30.8* 31.3*   RDW 15.0 14.6    151   MPV 10.2 10.1       Last 3 CMP:    Recent Labs     05/25/19  2254 05/27/19  0645    141   K 4.1 3.8   * 104   CO2 28 24   BUN 25* 18   CREATININE 1.3* 1.0   GLUCOSE 94 91   CALCIUM 9.1 8.9   PROT 6.9  --    LABALBU 3.8  --    BILITOT 0.7  --    ALKPHOS 66  --    AST 21  --    ALT 17  --        Last 3 Mag/Phos:  Recent Labs     05/27/19  0645   MG 2.0       Last 3 CK, CKMB, Troponin  Recent Labs     05/25/19 2254   TROPONINI <0.01       Last 3 Pro-BNP:  No results for input(s): PROBNP in the last 72 hours. Lab Results   Component Value Date    PROBNP 815 (H) 02/17/2019       Last 3 Glucose:     Recent Labs     05/25/19  2254 05/27/19 0645   GLUCOSE 94 91       Last 3 Coags:  Recent Labs     05/25/19 2254   PROTIME 14.5*   INR 1.3     Lab Results   Component Value Date    PROTIME 14.5 05/25/2019    INR 1.3 05/25/2019       Last 3 Lipid Panel:  No results for input(s): LDLCALC, HDL, TRIG in the last 72 hours. Invalid input(s): CHLPL  Lab Results   Component Value Date    LDLCALC 88 09/19/2017    LDLCALC 114 (H) 03/31/2016    LDLCALC 123 (H) 11/03/2014     Lab Results   Component Value Date    HDL 69 09/19/2017    HDL 60 03/31/2016    HDL 59 11/03/2014     Lab Results   Component Value Date    TRIG 47 09/19/2017    TRIG 51 03/31/2016    TRIG 46 11/03/2014     No components found for: CHLPL    TSH:  No results for input(s): TSH in the last 72 hours. Lab Results   Component Value Date    TSH 1.930 09/02/2018       ABGs:  No results for input(s): PH, PO2, PCO2, HCO3, BE, O2SAT in the last 72 hours. Lactic Acid:  Recent Labs     05/25/19 2254   LACTA 1.1         Radiology:  RAD Results:  CT Head WO Contrast   Final Result   Chronic microvascular infarcts and mild generalized volume loss. This report has been electronically signed by Johanna Kendrick MD.      CT Cervical Spine WO Contrast   Final Result   1. Negative for fracture or dislocation. 2. Degenerative spondylosis       This report has been electronically signed by Johanna Kendrick MD.      CT Lumbar Spine WO Contrast   Final Result   1. Negative for fracture or dislocation.     2. Degenerative spondylosis This report has been electronically signed by Sunshine Kenyon MD.      XR CHEST PORTABLE   Final Result      No airspace opacities or pleural effusion. EKG and Telemetry:  12-lead EKG personally reviewed and shows sinus rhythm rate 56  LAHB    Telemetry personally reviewed and shows sinus rhythm, some 2-2.2 second pauses        ASSESSMENT / PLAN:    1. Pauses- probably has some underlying sick sinus syndrome, but has nothing serious enough to warrant or qualify for a pacemaker. Is on aricept which could contribute. Avoid any other AV martha blockers. No need to repeat echo or stress at this time. Nothing further to add will see prn.  2 HTN- continue home meds  3 Dementia- looking for possible placement  4 H/o prostate CA  5  Debilitated with falls- for ECF          Thank you for consultation.     Juan Tse MD, [facility]  The [address] at [facility]    Electronically signed by Juan Tse MD on 5/28/2019 at 11:07 AM

## 2019-07-31 PROBLEM — L89.312 PRESSURE ULCER OF RIGHT BUTTOCK, STAGE 2 (HCC): Status: RESOLVED | Noted: 2018-01-17 | Resolved: 2019-01-01

## 2019-07-31 PROBLEM — R53.1 GENERALIZED WEAKNESS: Status: RESOLVED | Noted: 2019-01-01 | Resolved: 2019-01-01

## 2019-07-31 PROBLEM — I21.4 NSTEMI (NON-ST ELEVATED MYOCARDIAL INFARCTION) (HCC): Status: RESOLVED | Noted: 2018-08-15 | Resolved: 2019-01-01

## 2019-07-31 PROBLEM — O22.30 DVT (DEEP VEIN THROMBOSIS) IN PREGNANCY: Status: RESOLVED | Noted: 2019-02-17 | Resolved: 2019-01-01

## 2019-07-31 PROBLEM — E44.0 MODERATE PROTEIN-CALORIE MALNUTRITION (HCC): Status: RESOLVED | Noted: 2018-06-15 | Resolved: 2019-01-01

## 2019-08-19 PROBLEM — R41.82 ALTERED MENTAL STATUS: Status: ACTIVE | Noted: 2019-01-01

## 2019-08-19 PROBLEM — N17.9 AKI (ACUTE KIDNEY INJURY) (HCC): Status: ACTIVE | Noted: 2019-01-01

## 2019-08-19 PROBLEM — I48.91 A-FIB (HCC): Status: ACTIVE | Noted: 2019-01-01

## 2019-08-19 NOTE — H&P
Peripheral Pulses: +2 palpable, equal bilaterally     Ct Head Wo Contrast    Result Date: 2019  Patient MRN: [id number] : 1932 Age:  80 years Gender: Male Order Date: 2019 12:30 PM Exam: CT HEAD WO CONTRAST Number of Images: 109 views Indication:   HEAD TRAUMA, CLOSED, MILD, GCS >= 13, NO RISK FACTORS, NEURO EXAM NORMAL  Comparison: None. Technique: Sequential axial CT of the head was obtained from the base of the skull to the vertex without IV contrast.  Radiation Output: CTDIvol 75.88 (mGy); DLP 1397 (mGy-cm) Findings: The study demonstrates the fourth ventricle to be midline. The posterior fossa appears to be normal. There is global atrophy with periventricular ischemic white matter changes. There is no mass, mass effect or midline shift. The ventricles, sulci and cisterns are normal in appearance for patient's age. The gray-white matter differentiation is preserved throughout. There is no acute intracranial hemorrhage. No extra-axial fluid collection is identified. The bony calvarium is intact. The visualized portion of the paranasal sinuses and the mastoid air cells are clear. There is no focal extracranial soft tissue swelling. NO ACUTE INTRACRANIAL PROCESS     Xr Chest Portable    Result Date: 2019  Patient MRN:  [id number] : 1932 Age: 80 years Gender: Male Order Date:  2019 12:30 PM EXAM: XR CHEST PORTABLE one image INDICATION:  fever fever COMPARISON: None FINDINGS: There is borderline cardiac size. There is minimal atelectasis in the lung bases. There is no focal consolidation or pleural effusion. There is gastric distention. Atelectasis in the lung bases. There is no focal infiltrate or pleural effusion.        Labs:     Recent Labs     19  1235   WBC 6.2   HGB 10.5*   HCT 33.1*        Recent Labs     19  1235      K 4.4      CO2 29   BUN 31*   CREATININE 1.6*   CALCIUM 9.3     Recent Labs     19  1235   AST 30   ALT 24   BILITOT 0. 6   ALKPHOS 62     No results for input(s): INR in the last 72 hours. Recent Labs     08/19/19  1235   CKTOTAL 180   TROPONINI 0.03     Urinalysis:      Lab Results   Component Value Date    NITRU Negative 08/19/2019    BLOODU Negative 08/19/2019    SPECGRAV 1.025 08/19/2019    GLUCOSEU Negative 08/19/2019     ASSESSMENT:    Active Hospital Problems    Diagnosis Date Noted    Altered mental status [R41.82] 08/19/2019    DINA (acute kidney injury) ([facility] [N17.9] 08/19/2019    A-fib ([facility] [I48.91] 08/19/2019       ? New afib, but SR on tele in ED. Dehydration with DINA      PLAN:    Admit   Resume home meds   IVF   Hold diuretics   Consider anticoagulation if truly afib, but patient has IVC filter per report of son, so likely not candidate for [facility]       DVT Prophylaxis: Lovenox   Diet: No diet orders on file  Code Status: No Order    PT/OT Eval Status: ordered     Dispo - tele admit        Rosalba Gosselin, APRN - CNP    Thank you Suni Sen DO for the opportunity to be involved in this patient's care.  If you have any questions or concerns please feel free to contact me at [phone]

## 2019-08-19 NOTE — ED PROVIDER NOTES
blood work revealed slight DINA but nothing to compare to. Due to the patient's symptoms [facility] was called due to the need for admission. The case was presented and they voiced understanding and agreed. Pt Made aware voiced understanding and agreed. I personally spoke to the family while in the phone and told them there is a high likelihood of the patient being admitted they voiced understanding and agreed as well. Counseling:   I have spoken with the patient and discussed todays results, in addition to providing specific details for the plan of care and counseling regarding the diagnosis and prognosis and are agreeable with the plan. Assessment      1. Altered mental status, unspecified altered mental status type    2. Generalized weakness    3. Atrial fibrillation, new onset ([facility]    4. Near syncope    5. DINA (acute kidney injury) ([facility]      This patient's ED course included: a personal history and physicial examination, re-evaluation prior to disposition, multiple bedside re-evaluations, IV medications, cardiac monitoring, continuous pulse oximetry and complex medical decision making and emergency management  This patient has remained hemodynamically stable during their ED course. Plan   Admit to telemetry  Patient condition is stable. New Medications     New Prescriptions    No medications on file     Electronically signed by Angela Aguilar PA-C   DD: 8/19/19  **This report was transcribed using voice recognition software. Every effort was made to ensure accuracy; however, inadvertent computerized transcription errors may be present.   END OF PROVIDER NOTE      Angela Aguilar PA-C  08/19/19 [id number]

## 2019-08-20 NOTE — PROGRESS NOTES
throughout evaluation. Simple VCs required consistently. Significantly increased time for all activity. ROM/MMT screened in supine. Pt required total assist for BLE and assist for trunk righting from elevated HOB. Pt posterior in sitting EOB and was unable to correct without physical assist.  Pt continued to have posterior LOB and lean with all activity while sitting EOB. Sitting balance activity performed EOB >8 minutes. ROM/MMT reassessed sitting EOB with minimal improvement. STS attempted 4x from elevated bed prior to pt being able to achieve standing. Pt stood with FFP. PT maintained standing for approximately 1 minute while clean chux was placed on bed. Pt took one side step towards [facility] prior to impulsively returning to sitting due to not being able to maintain standing. Pt returned to supine. Pt required assist to bend B knees and attempt bridge. Pt barely able to clear B hips from bed surface. Pt requested use of urinal.  Pt was situated with bed in chair position to improve upright tolerance, increase interaction with the environment, and pillows placed to promote skin and joint integrity. Pt was left with call button within reach and all needs met. [facility] notified that pt may require pt care and clean linens. Patient education  Pt educated on PT role, safety with mobility, transfer technique, gait training and postural reducation. Education provided on benefits of OOB activity to prevent iatrogenic effects. Patient response to education:   Pt verbalized understanding Pt demonstrated skill Pt requires further education in this area   Yes Requires assist/VCs Yes     Rehab potential is Good for reaching above PT goals. Pts/ family goals   1. None stated. Patient and or family understand(s) diagnosis, prognosis, and plan of care. PLAN  PT care will be provided in accordance with the objectives noted above.   Whenever appropriate, clear delegation orders will be provided for

## 2019-08-20 NOTE — PROGRESS NOTES
good  and wfl FMC/dexterity noted during ADL tasks     Hearing: wfl  Sensation:wfl  Tone: wfl  Edema: B LE edema                             Comments/Treatment: Upon arrival, patient supine in bed and agreeable to OT Session. Therapist facilitated bed mobility (rolling, supine<>sit), unsupported sitting balance, functional sit to stand transfers (elevated bed surface), standing tolerance tasks (addressing posture, balance and activity tolerance) - skilled cuing on hand placement, posture, body mechanics and safety. Therapist facilitated self-care retraining: UB/LB self-care tasks (robe, socks), toileting hygiene task and seated grooming task while educating pt on modified techniques, posture, safety and energy conservation techniques. Skilled monitoring of HR, O2 sats and pts response to treatment. Pt demonstrating P+ understanding of education/techniques, requiring additional training / education. At end of session, patient supine in bed with call light and phone within reach, all lines intact. Pt would benefit from continued skilled OT to increase functional independence and quality of life.     Eval Complexity: mod  Profile and History- med (extensive chart review)  Assessment of Occupational Performance and Identification of Deficits- med  Clinical Decision Making- med     (Evaluation time includes thorough review of current medical information, gathering information on past medical history/social history and prior level of function, completion of standardized testing/informal observation of tasks, assessment of data, and development of POC/Goals.)      Assessment of current deficits   Functional mobility [x]  ADLs [x] Strength [x]  Cognition [x]  Functional transfers  [x] IADLs [x] Safety Awareness [x]  Endurance [x]  Fine Motor Coordination [] Balance [x] Vision/perception [] Sensation []   Gross Motor Coordination [] ROM [] Delirium []                  Motor Control []    Plan of Care:   ADL retraining [x]   Equipment needs [x]   Neuromuscular re-education [x] Energy Conservation Techniques [x]  Functional Transfer training [x] Patient and/or Family Education [x]  Functional Mobility training [x]  Environmental Modifications [x]  Cognitive re-training []   Compensatory techniques for ADLs [x]  Splinting Needs []   Positioning to improve overall function [x]   Therapeutic Activity [x]  Therapeutic Exercise  [x]  Visual/Perceptual: []    Delirium prevention/treatment  []   Other:  []    Rehab Potential: Fair for established goals     Patient / Family Goal:  Not stated     Patient and/or family were instructed diagnosis, prognosis/goals and plan of care. Demonstrated P+ understanding. [] Malnutrition indicators have been identified and nursing has been notified to ensure a dietitian consult is ordered.        AM-PAC Daily Activity Inpatient   How much help for putting on and taking off regular lower body clothing?: Total  How much help for Bathing?: A Lot  How much help for Toileting?: Total  How much help for putting on and taking off regular upper body clothing?: A Little  How much help for taking care of personal grooming?: A Little  How much help for eating meals?: None  AM-PAC Inpatient Daily Activity Raw Score: 14  AM-PAC Inpatient ADL T-Scale Score : 33.39  ADL Inpatient CMS 0-100% Score: 59.67  ADL Inpatient CMS G-Code Modifier : CK       mod Evaluation + 23 timed treatment minutes  Tx Time in: 842  Tx Time out: 10[address] 25 OTR/L #[id number]

## 2019-08-20 NOTE — CONSULTS
PROGRESS NOTE     CARDIOLOGY    Chief complaint: Seen today for follow up, management & recommendations for \"afib\"    He denies chest pain or shortness of breath today. No cardiac hx, no fib in past    Wt Readings from Last 3 Encounters:   08/19/19 227 lb (103 kg)     Temp Readings from Last 3 Encounters:   08/20/19 97.4 °F (36.3 °C) (Temporal)     BP Readings from Last 3 Encounters:   08/20/19 103/62     Pulse Readings from Last 3 Encounters:   08/20/19 72         Intake/Output Summary (Last 24 hours) at 8/20/2019 0925  Last data filed at 8/20/2019 0557  Gross per 24 hour   Intake 1354.13 ml   Output 850 ml   Net 504.13 ml       Recent Labs     08/19/19  1235 08/20/19  0602   WBC 6.2 5.4   HGB 10.5* 10.2*   HCT 33.1* 32.6*   MCV 92.7 92.9    180     Recent Labs     08/19/19  1235 08/20/19  0602    144   K 4.4 4.1    108*   CO2 29 27   PHOS  --  2.6   BUN 31* 33*   CREATININE 1.6* 1.4*   MG 2.4 2.3     No results for input(s): PROTIME, INR in the last 72 hours. Recent Labs     08/19/19  1235   CKTOTAL 180   TROPONINI 0.03     No results for input(s): BNP in the last 72 hours. No results for input(s): CHOL, HDL, TRIG in the last 72 hours. Invalid input(s): CHOLHDLR, LDLCALCU        sodium chloride flush 0.9 % injection 10 mL 2 times per day   sodium chloride flush 0.9 % injection 10 mL PRN   magnesium hydroxide (MILK OF MAGNESIA) 400 MG/5ML suspension 30 mL Daily PRN   acetaminophen (TYLENOL) tablet 650 mg Q4H PRN   0.9 % sodium chloride infusion Continuous   aspirin EC tablet 81 mg Daily   donepezil (ARICEPT) tablet 10 mg Daily   memantine (NAMENDA) tablet 10 mg BID   trospium (SANCTURA) tablet 20 mg BID AC       Review of systems:     Heart: as above   Lungs: as above   Eyes: denies changes in vision or discharge. Ears: denies changes in hearing or pain. Nose: denies epistaxis or masses   Throat: denies sore throat or trouble swallowing.     Neuro: denies numbness, tingling,

## 2019-08-20 NOTE — PROGRESS NOTES
irregular rate and rhythm with normal S1/S2 without murmurs, rubs or gallops. Abdomen: Soft, non-tender, non-distended with normal bowel sounds. Musculoskeletal:  No clubbing, cyanosis or edema bilaterally. Full range of motion without deformity. Skin: Skin color, texture, turgor normal.  No rashes or lesions. Neurologic:  Neurovascularly intact  Psychiatric:  Alert and oriented, thought content appropriate, normal insight        Labs:   Recent Labs     08/19/19  1235 08/20/19  0602   WBC 6.2 5.4   HGB 10.5* 10.2*   HCT 33.1* 32.6*    180     Recent Labs     08/19/19  1235 08/20/19  0602    144   K 4.4 4.1    108*   CO2 29 27   BUN 31* 33*   CREATININE 1.6* 1.4*   CALCIUM 9.3 8.9   PHOS  --  2.6     Recent Labs     08/19/19  1235   AST 30   ALT 24   BILITOT 0.6   ALKPHOS 62     No results for input(s): INR in the last 72 hours. Recent Labs     08/19/19  1235   CKTOTAL 180   TROPONINI 0.03       Urinalysis:      Lab Results   Component Value Date    NITRU Negative 08/19/2019    BLOODU Negative 08/19/2019    SPECGRAV 1.025 08/19/2019    GLUCOSEU Negative 08/19/2019       Radiology:  CT Head WO Contrast   Final Result      NO ACUTE INTRACRANIAL PROCESS         XR CHEST PORTABLE   Final Result   Atelectasis in the lung bases. There is no focal infiltrate or pleural   effusion.                     Assessment/Plan:    Active Hospital Problems    Diagnosis    Altered mental status [R41.82]    DIOGO (acute kidney injury) ([facility] [N17.9]    [facility] [I48.91]     81 yo male hx dementia, dvt ivc filter pe lives at home, with home health, noted while after bath, orthostatic symptoms, along with few days of anorexia\" food not good\" denies fever chills, no n/v , got large amount diarrhea per son on day of admission, pt mumbles words baseline, in ER noted diogo, hypotension, dry mouth, dark urine , on lasix/kcl at home, ua/cxr negative, ct head negative    Diogo  Dehydration  Dementia  AFIB

## 2019-08-21 NOTE — PROGRESS NOTES
Messaged Dr. Jeb Ang and states it is okay for DC. Messaged Dr. Rei Mohamud re: DC order and [facility] order.        Sukumar Mendes

## 2019-08-21 NOTE — PROGRESS NOTES
08/21/19  0713   WBC 6.2 5.4 4.2*   HGB 10.5* 10.2* 9.6*   HCT 33.1* 32.6* 31.3*    180 173     Recent Labs     08/19/19  1235 08/20/19  0602 08/21/19  0713    144 144   K 4.4 4.1 3.7    108* 108*   CO2 29 27 26   BUN 31* 33* 23   CREATININE 1.6* 1.4* 1.0   CALCIUM 9.3 8.9 8.5*   PHOS  --  2.6 2.2*     Recent Labs     08/19/19  1235   AST 30   ALT 24   BILITOT 0.6   ALKPHOS 62     No results for input(s): INR in the last 72 hours. Recent Labs     08/19/19  1235   CKTOTAL 180   TROPONINI 0.03     Recent Labs     08/19/19  1235   AST 30   ALT 24   BILITOT 0.6   ALKPHOS 62     No results for input(s): LACTA in the last 72 hours.   No results found for: Jose Cabrera  No results found for: AMMONIA    Assessment:   change in mental status  No atrial arrhythmias  Dementia   OAB    Plan:    Dc home   Electronically signed by Iván Garcia DO on 8/21/2019 at 12:33 PM [facility]

## 2019-08-22 NOTE — DISCHARGE SUMMARY
Hospital Medicine Discharge Summary    Patient: Fallon Borja     Gender: male  : 1932   Age: 80 y.o. MRN: [id number]    Code Status: f ull code  Primary Care Provider: Keyana Gibson DO    Admit Date: 2019   Discharge Date: 2019      Admitting Physician: Wing Natanael MD  Discharge Physician: Warden Stacie DO     Discharge Diagnoses:     change in mental status  No atrial arrhythmias  Dementia   OAB        Hospital Course:   *lasix held while IP, his echo showed, No significant valvular abnormalities.  Micro-bubble contrast injected to enhance left ventricular visualization.  No evidence of left ventricular mass or thrombus noted. Normal left ventricle size.   Mild concentric left ventricular hypertrophy. proximal septal thickening/hypertrophy. Stage I diastolic dysfunction.   Ejection fraction is visually estimated at 55-60% **    Disposition:  Home with Home Health Care    Exam:     BP (!) 161/85   Pulse 79   Temp 97.8 °F (36.6 °C) (Temporal)   Resp 18   Ht 6' 4\" (1.93 m)   Wt 227 lb (103 kg)   SpO2 97%   BMI 27.63 kg/m²   Gen: *well developed  HEENT: NC/AT, moist mucous membranes, no oropharyngeal erythema or exudate  Neck: supple, trachea midline, no anterior cervical or SC LAD  Heart:  Normal s1/s2, RRR, no murmurs, gallops, or rubs. Lungs:  cta *  Abd: bowel sounds present, soft, nontender, nondistended, no masses  Extrem:  No clubbing, cyanosis,   Pos ** edema  Skin: no rashes or lesions  Psych: A & O x3  Neuro: grossly intact, moves all four extremities. Capillary Refill: Brisk,< 3 seconds   Peripheral Pulses: +2 palpable, equal bilaterally         General appearance:  No apparent distress, appears stated age and cooperative. HEENT:  Normal cephalic, atraumatic without obvious deformity. Pupils equal, round, and reactive to light. Extra ocular muscles intact. Conjunctivae/corneas clear. Neck: Supple, with full range of motion. No jugular venous distention. have NOT CHANGED    Details   trospium (SANCTURA) 20 MG tablet Take 20 mg by mouth nightlyHistorical Med      donepezil (ARICEPT) 10 MG tablet Take 10 mg by mouth dailyHistorical Med      memantine (NAMENDA) 10 MG tablet Take 10 mg by mouth 2 times dailyHistorical Med      aspirin 81 MG tablet Take 81 mg by mouth dailyHistorical Med      Cholecalciferol (VITAMIN D3) 2000 units CAPS Take 1 capsule by mouth dailyHistorical Med           Discharge Medication List as of 8/21/2019  3:59 PM      STOP taking these medications       furosemide (LASIX) 20 MG tablet Comments:   Reason for Stopping:         potassium chloride (KLOR-CON M) 20 MEQ extended release tablet Comments:   Reason for Stopping: Follow-up appointments:  1 week    Provider Follow-up:    pmd    Condition at Discharge:  Stable    The patient was seen and examined on day of discharge and this discharge summary is in conjunction with any daily progress note from day of discharge. Time Spent on discharge is 45 minutes  in the examination, evaluation, counseling and review of medications and discharge plan. Signed:    RENZO North   8/22/2019      Thank you Placido Garcia DO for the opportunity to be involved in this patient's care.  If you have any questions or concerns please feel free to contact me at [phone]

## 2019-08-30 PROBLEM — I26.99 ACUTE MASSIVE PULMONARY EMBOLISM (HCC): Status: ACTIVE | Noted: 2019-01-01

## 2019-08-30 NOTE — ED NOTES
Bed: 18A-18  Expected date:   Expected time:   Means of arrival:   Comments:  EMS     Lisset Chu RN  08/30/19 3105

## 2019-08-30 NOTE — ED PROVIDER NOTES
The patient is an 80-year-old male with a history of dementia who presents to the emergency department after experiencing loss of consciousness. History is limited as there is no caregiver present with the patient and he has dementia. He states he does not remember what happened. Per EMS and nursing report the patient was at home and the visiting nurse was at the bedside attempting to stand up the patient, when he had a syncopal episode. Unknown if the patient hit his head or fall to the ground. Unable to obtain review of systems due to the patient's mental status. The history is provided by the patient. Loss of Consciousness   Episode history:  Single  Most recent episode: Today  Timing:  Unable to specify  Progression:  Unable to specify  Chronicity:  New  Context: standing up    Witnessed: yes    Relieved by:  None tried  Worsened by:  Nothing  Ineffective treatments:  None tried       Review of Systems   Unable to perform ROS: Dementia   Cardiovascular: Positive for syncope. Physical Exam   Constitutional: He appears well-developed and well-nourished. He has a sickly appearance. He appears ill. No distress. Nasal cannula in place. Chronically ill-appearing male in no acute distress   HENT:   Head: Normocephalic and atraumatic. Nose: Nose normal.   Mouth/Throat: Mucous membranes are not dry. Eyes: Pupils are equal, round, and reactive to light. Conjunctivae, EOM and lids are normal.   Neck: Normal range of motion. Neck supple. No spinous process tenderness present. No neck rigidity. Normal range of motion present. Cardiovascular: Regular rhythm, S1 normal, S2 normal and normal heart sounds. Bradycardia present. No murmur heard. Pulmonary/Chest: Effort normal and breath sounds normal. No stridor. No respiratory distress. He has no decreased breath sounds. He has no wheezes. He has no rhonchi. He has no rales. Abdominal: Soft. Bowel sounds are normal. He exhibits no distension. subsegmental PE on the left side. Treated patient with a dose of Lovenox in the emergency department. The patient is hemodynamically stable upon admission. Consulted with hospitalist, Dr. Richard Teague, who accepted the patient for admission. ED Course as of Aug 30 1746   Fri Aug 30, 2019   1744 Consulted with Dr. Richard Teague, who accepted the patient for admission. [KG]      ED Course User Index  [KG] Rohith Farah DO          EKG: This EKG is signed and interpreted by me. Rate: 55  Rhythm: Junctional  Interpretation: Junctional rhythm, left axis deviation, left anterior fascicular block, no ST elevations or depressions present  Comparison: changes compared to previous EKG 08/19/19       ED Course as of Aug 30 1746   Fri Aug 30, 2019   1744 Consulted with Dr. Richard Teague, who accepted the patient for admission. [KG]      ED Course User Index  [KG] Rohith Farah DO       --------------------------------------------- PAST HISTORY ---------------------------------------------  Past Medical History:  has a past medical history of Alzheimer disease, DVT (deep venous thrombosis) ([facility] History of pulmonary embolism, Macular degeneration, Metabolic syndrome, Presence of IVC filter, and [facility] Past Surgical History:  has a past surgical history that includes hernia repair; Colonoscopy (07/15/2003); polypectomy; and other surgical history (02/19/2019). Social History:  reports that he has never smoked. He has never used smokeless tobacco. He reports that he drank alcohol. He reports that he does not use drugs. Family History: family history is not on file. The patients home medications have been reviewed. Allergies: Patient has no known allergies.     -------------------------------------------------- RESULTS -------------------------------------------------    LABS:  Results for orders placed or performed during the hospital encounter of 08/30/19   CBC auto differential

## 2019-08-31 NOTE — PROGRESS NOTES
08/30/2019     08/31/2019    CO2 23 08/31/2019    BUN 13 08/31/2019    CREATININE 0.9 08/31/2019    GFRAA >60 08/31/2019    LABGLOM >60 08/31/2019    GLUCOSE 81 08/31/2019    GLUCOSE 84 04/25/2012    PROT 6.2 08/30/2019    LABALBU 3.4 08/30/2019    LABALBU 4.1 04/25/2012    CALCIUM 8.5 08/31/2019    BILITOT 0.4 08/30/2019    ALKPHOS 66 08/30/2019    AST 43 08/30/2019    ALT 33 08/30/2019     PT/INR:    Lab Results   Component Value Date    PROTIME 14.5 05/25/2019    INR 1.3 05/25/2019       CLINICAL ASSESMENT:  1. Subsegmental Pulmonary embolism likely old  2. Hx of DVT   3. US legs negative this admission      PLAN: If needed the case was discussed with the care team  1. Franciscas as per Santy Vo note  2. PT OT ordered  3.        Kisha Bowser DO, MPH, Kenna Luciano  Professor of Medicine

## 2019-08-31 NOTE — PROGRESS NOTES
Unable to complete admission database/med rec due to patient's mental status. Attempted to call family with no answer.

## 2019-08-31 NOTE — PROGRESS NOTES
ASPECT OF RIGHT FOOT   Neurologic:  Neurovascularly intact without any focal sensory/motor deficits. Cranial nerves: II-XII intact, grossly non-focal.  Psychiatric:  Alert and oriented, thought content appropriate,   Capillary Refill: Brisk,< 3 seconds   Peripheral Pulses: +2 palpable, equal bilaterally                   Labs:   Recent Labs     08/30/19  1427   WBC 5.2   HGB 9.4*   HCT 29.8*        Recent Labs     08/30/19  1427 08/31/19  0655    145   K 4.1 3.7    109*   CO2 26 23   BUN 15 13   CREATININE 1.0 0.9   CALCIUM 9.1 8.5*     Recent Labs     08/30/19  1427   AST 43*   ALT 33   BILITOT 0.4   ALKPHOS 66     No results for input(s): INR in the last 72 hours. Recent Labs     08/30/19  1427   TROPONINI 0.01     Recent Labs     08/30/19  1427   AST 43*   ALT 33   BILITOT 0.4   ALKPHOS 66     No results for input(s): LACTA in the last 72 hours.   No results found for: Avanell Soulier  No results found for: AMMONIA    Assessment:    Active Hospital Problems    Diagnosis Date Noted    Acute massive pulmonary embolism ([facility] [address] [I26.99] 08/30/2019   dementia  OAB   bilsters on right foot   edema of legs  Social crisis( needs placement)        PLAN:  US of legs   start  eliquis   consult pod  Consult pulm  Cont aricept    cont namenda        DVT Prophylaxis: *lovenox  Diet: No diet orders on file  Code Status full code     PT/OT Eval Status: * ordered         Electronically signed by Kendall Webster DO on 8/31/2019 at 2:41 PM [facility]

## 2019-09-02 PROBLEM — Z87.898 HISTORY OF URINARY URGENCY: Status: RESOLVED | Noted: 2018-01-17 | Resolved: 2019-01-01

## 2019-09-02 PROBLEM — R53.1 GENERAL WEAKNESS: Status: RESOLVED | Noted: 2018-06-15 | Resolved: 2019-01-01

## 2019-09-02 PROBLEM — N17.9 AKI (ACUTE KIDNEY INJURY) (HCC): Status: RESOLVED | Noted: 2018-06-15 | Resolved: 2019-01-01

## 2019-09-02 PROBLEM — N32.81 OAB (OVERACTIVE BLADDER): Status: ACTIVE | Noted: 2019-01-01

## 2019-09-02 PROBLEM — F02.80 ALZHEIMER'S DEMENTIA WITHOUT BEHAVIORAL DISTURBANCE (HCC): Status: ACTIVE | Noted: 2019-01-01

## 2019-09-02 PROBLEM — F02.818 ALZHEIMER'S DEMENTIA WITH BEHAVIORAL DISTURBANCE (HCC): Status: RESOLVED | Noted: 2018-09-01 | Resolved: 2019-01-01

## 2019-09-02 PROBLEM — I26.99 OTHER PULMONARY EMBOLISM WITHOUT ACUTE COR PULMONALE (HCC): Status: ACTIVE | Noted: 2019-01-01

## 2019-09-02 PROBLEM — R41.82 ALTERED MENTAL STATUS: Status: RESOLVED | Noted: 2018-09-02 | Resolved: 2019-01-01

## 2019-09-02 PROBLEM — R60.0 BILATERAL LOWER EXTREMITY EDEMA: Status: ACTIVE | Noted: 2019-01-01

## 2019-09-02 PROBLEM — S90.821A: Status: ACTIVE | Noted: 2019-01-01

## 2019-09-02 PROBLEM — G30.9 ALZHEIMER'S DEMENTIA WITH BEHAVIORAL DISTURBANCE (HCC): Status: RESOLVED | Noted: 2018-09-01 | Resolved: 2019-01-01

## 2019-09-02 PROBLEM — F03.90 DEMENTIA (HCC): Status: ACTIVE | Noted: 2019-01-01

## 2019-09-02 PROBLEM — G30.9 ALZHEIMER'S DEMENTIA WITHOUT BEHAVIORAL DISTURBANCE (HCC): Status: ACTIVE | Noted: 2019-01-01

## 2019-09-03 NOTE — CARE COORDINATION
SERAFIN Discharge planning:    SW spoke with patients son. Patients son would like for patient to go to [facility]. SW made referral to Raudel Pryor. Will await decision.  PT/OT notified of stat eval. NIKKI Morrow

## 2019-09-03 NOTE — DISCHARGE INSTR - COC
transfer of Gsu Perez  is necessary for the continuing treatment of the diagnosis listed and that he requires [facility] for less 30 days.      Update Admission H&P: No change in H&P    PHYSICIAN SIGNATURE:  Electronically signed by Raymundo Robledo MD on 9/3/19 at 2:47 PM

## 2019-09-03 NOTE — DISCHARGE SUMMARY
remain essential to reducing risk of future atherosclerotic development    Activity: activity as tolerated    Significant labs:  CBC:   Recent Labs     19  0445 19  0633 19  0634   HGB 9.3* 9.2* 9.4*   HCT 29.5* 28.7* 29.6*     BMP:   Recent Labs     19  0445 19  0633 19  0634    141 144   K 3.6 3.5 3.7    104 105   CO2 26 25 23   BUN 12 11 11   CREATININE 0.9 1.1 1.1     LFT:  No results for input(s): PROT, ALB, ALKPHOS, ALT, AST, BILITOT, AMYLASE, LIPASE in the last 72 hours. PT/INR: No results for input(s): INR, APTT in the last 72 hours. BNP: No results for input(s): BNP in the last 72 hours. Hgb A1C:   Lab Results   Component Value Date    LABA1C 5.2 2017     Folate and B12: No results found for: Ines Life, No results found for: FOLATE  Thyroid Studies:   Lab Results   Component Value Date    TSH 1.930 2018       Urinalysis:    Lab Results   Component Value Date    NITRU Negative 2019    WBCUA 1-3 2019    BACTERIA FEW 2019    RBCUA NONE 2019    RBCUA NONE 2012    BLOODU Negative 2019    SPECGRAV 1.025 2019    GLUCOSEU Negative 2019       Imaging:  Ct Head Wo Contrast    Result Date: 2019  Patient MRN: [id number] : 1932 Age:  80 years Gender: Male Order Date: 2019 2:00 PM Exam: CT HEAD WO CONTRAST Number of Images: 150 views Indication:   Acute syncope  Comparison: 2019 TECHNIQUE: Region of study: Head. CT images acquired without intravenous contrast. One or more of these dose optimization techniques were utilized: Automated exposure control; mA and/or kV adjustment per patient size (includes targeted exams where dose is matched to clinical indication); or iterative reconstruction. FINDINGS: There is stable atrophy and chronic microvascular ischemic disease. There is no mass, hemorrhage or midline shift. The bony calvarium is unremarkable.      Stable atrophy and chronic microvascular ischemic disease. Ct Head Wo Contrast    Result Date: 2019  Patient MRN: [id number] : 1932 Age:  80 years Gender: Male Order Date: 2019 12:30 PM Exam: CT HEAD WO CONTRAST Number of Images: 109 views Indication:   HEAD TRAUMA, CLOSED, MILD, GCS >= 13, NO RISK FACTORS, NEURO EXAM NORMAL  Comparison: None. Technique: Sequential axial CT of the head was obtained from the base of the skull to the vertex without IV contrast.  Radiation Output: CTDIvol 75.88 (mGy); DLP 1397 (mGy-cm) Findings: The study demonstrates the fourth ventricle to be midline. The posterior fossa appears to be normal. There is global atrophy with periventricular ischemic white matter changes. There is no mass, mass effect or midline shift. The ventricles, sulci and cisterns are normal in appearance for patient's age. The gray-white matter differentiation is preserved throughout. There is no acute intracranial hemorrhage. No extra-axial fluid collection is identified. The bony calvarium is intact. The visualized portion of the paranasal sinuses and the mastoid air cells are clear. There is no focal extracranial soft tissue swelling. NO ACUTE INTRACRANIAL PROCESS     Ct Cervical Spine Wo Contrast    Result Date: 2019  Patient MRN:  [id number] : 1932 Age: 80 years Gender: Male Order Date:  2019 2:00 PM EXAM: CT CERVICAL SPINE WO CONTRAST NUMBER OF IMAGES:  601 INDICATION:  syncope COMPARISON: 2019 TECHNIQUE: Axial CT of the cervical spine was obtained. Sagittal and coronal MPR reconstructions were performed and uploaded to PACS for evaluation. Low-dose CT  acquisition technique included one of following options; 1 . Automated exposure control, 2. Adjustment of MA and or KV according to patient's size or 3. Use of iterative reconstruction. FINDINGS: No acute fracture or dislocation is seen. No prevertebral soft tissue swelling is identified.  Moderate, both the anterior syndesmophytes are seen extending from C2 to C6. Heterogeneous bony sclerosis is identified within these vertebral bodies. Mild facet joint osteoarthropathy seen throughout the cervical spine. Loss of intervertebral disc height is seen at C3-C4. No acute osseous findings. Diffuse idiopathic skeletal hyperostosis of the cervical spine. Xr Chest Portable    Result Date: 2019  Patient MRN:  [id number] : 1932 Age: 80 years Gender: Male Order Date:  2019 2:00 PM EXAM: XR CHEST PORTABLE COMPARISON: 2019 INDICATION:  fall fall FINDINGS: The heart is normal in size. No focal airspace opacity. There is no pleural effusion. There is no pneumothorax. No free air beneath the diaphragms. No airspace opacities or pleural effusion. Xr Chest Portable    Result Date: 2019  Patient MRN:  [id number] : 1932 Age: 80 years Gender: Male Order Date:  2019 12:30 PM EXAM: XR CHEST PORTABLE one image INDICATION:  fever fever COMPARISON: None FINDINGS: There is borderline cardiac size. There is minimal atelectasis in the lung bases. There is no focal consolidation or pleural effusion. There is gastric distention. Atelectasis in the lung bases. There is no focal infiltrate or pleural effusion. Cta Chest W Contrast    Result Date: 2019  Patient MRN:  [id number] : 1932 Age: 80 years Gender: Male Order Date:  2019 2:15 PM EXAM: CTA CHEST W CONTRAST number of images 425 including MIP coronal and sagittal reconstruction images. Contrast. Isovue-370, 70 mL intravenously Technique: Low-dose CT  acquisition technique included one of following options; 1 . Automated exposure control, 2. Adjustment of MA and or KV according to patient's size or 3. Use of iterative reconstruction. INDICATION:  CHEST PAIN, ACUTE, PULMONARY EMBOLISM SUSPECTED  COMPARISON: None FINDINGS: There is borderline cardiac size. The great vessels are unremarkable. The trachea and major bronchi are patent.  There is coronary artery tablet  Commonly known as:  LASIX     memantine 10 MG tablet  Commonly known as:  NAMENDA  Take 1 tablet by mouth 2 times daily     potassium chloride 20 MEQ extended release tablet  Commonly known as:  KLOR-CON M     * trospium 20 MG tablet  Commonly known as:  SANCTURA     * trospium 20 MG tablet  Commonly known as:  SANCTURA     Vitamin D3 2000 units Caps         * This list has 2 medication(s) that are the same as other medications prescribed for you. Read the directions carefully, and ask your doctor or other care provider to review them with you. Where to Get Your Medications      You can get these medications from any pharmacy    Bring a paper prescription for each of these medications  · apixaban 5 MG Tabs tablet  · pantoprazole 40 MG tablet         Time Spent on discharge is more than 35 minutes in the examination, evaluation, counseling and review of medications and discharge plan.    +++++++++++++++++++++++++++++++++++++++++++++++++  Joo Zabala MD, Hospitalist  +++++++++++++++++++++++++++++++++++++++++++++++++  NOTE: This report was transcribed using voice recognition software. Every effort was made to ensure accuracy; however, inadvertent computerized transcription errors may be present.

## 2019-09-03 NOTE — PROGRESS NOTES
OCCUPATIONAL THERAPY INITIAL EVALUATION      Date:9/3/2019  Patient Name: Rosalio Simon  MRN: [id number]  : 1932  Room: [address] [place] [address]      Evaluating [address] [place] [address], OTR/L #[id number]    AM-PAC Daily Activity Raw Score:   Recommended Adaptive Equipment: TBD     Diagnosis: Acute massive pulmonary embolism ([address] [I26.99]  Acute massive pulmonary embolism ([address] [I26.99]     Pt presented to ED on 19 for syncopal episode, LOC  Pertinent Medical History: Alzheimer's disease, DVT/PE (IVC filter), macular degeneration    Precautions:  Falls, bed alarm, Alzheimer's, vision loss, incontinent of urine     Home Living: Pt lives with spouse in split level home. 3-4 BERONICA, 1 handrail + 5 inside stairs to main level (kitchen, living room, etc). Bed/bath on top level - pt unable to recall stair #   Bathroom setup: unable to obtain   Equipment owned: [facility], hospital bed?, walker, cane    Prior Level of Function: assistance with ADLs , assistance with IADLs; ambulated w/ cane and assistance  Driving: no  Caregiver daily \"~half day\"  Pt is a questionable historian - family/caregiver not present    Pain Level: Pt c/o L LE pain this session ; reinforced pain management strategies    Cognition: A&O: 2/4 (self and place. Pt able to recall year/month w/ choices); Follows 1 step directions  Delayed response time. Pt required min/mod cues for sequencing and attention to tasks.    Memory:  poor +   Sequencing:  fair -   Problem solving:  poor   Judgement/safety:  poor     Functional Assessment:   Initial Eval Status  Date: 9/3/19 Treatment Status  Date: Short Term Goals  Treatment frequency: PRN    Feeding Stand by Assist   Modified [place]    Grooming Minimal Assist   Seated EOB  Modified [place]    UB Dressing Minimal Assist   gown  Supervision    LB Dressing Dependent   Moderate Assist    Bathing Maximal Assist  Moderate Assist    Toileting Dependent     Incontinent of urine  Extensive assist for hygiene  Moderate Assist tubes intact. Pt would benefit from continued skilled OT to increase functional independence and quality of life. mod  Profile and History- med (extensive chart review)  Assessment of Occupational Performance and Identification of Deficits- med  Clinical Decision Making- med    Evaluation time includes thorough review of current medical information, gathering information on past medical history/social history and prior level of function, completion of standardized testing/informal observation of tasks, assessment of data, and development of POC/Goals. Assessment of current deficits   Functional mobility [x]  ADLs [x] Strength [x]  Cognition [x]  Functional transfers  [x] IADLs [] Safety Awareness [x]  Endurance [x]  Fine Motor Coordination [x] Balance [x] Vision/perception [x] Sensation []   Gross Motor Coordination [] ROM [x] Delirium []                  Motor Control []    Plan of Care:   ADL retraining [x]   Equipment needs [x]   Neuromuscular re-education [x] Energy Conservation Techniques [x]  Functional Transfer training [x] Patient and/or Family Education [x]  Functional Mobility training [x]  Environmental Modifications [x]  Cognitive re-training [x]   Compensatory techniques for ADLs [x]  Splinting Needs []   Positioning to improve overall function [x]   Therapeutic Activity [x]  Therapeutic Exercise  [x]  Visual/Perceptual: [x]    Delirium prevention/treatment  []   Other:  []    Rehab Potential: Good for established goals    Patient / Family Goal:  Not stated     Patient and/or family were instructed on diagnosis, prognosis/goals and plan of care. Demonstrated poor understanding. [] Malnutrition indicators have been identified and nursing has been notified to ensure a dietitian consult is ordered.        Mod Evaluation completed +  Timed Treatment: 15 minutes  Tx Time in: 08:44  TxTime out: 08:59        Valorie Palmer, OTR/L #[id number]

## 2019-12-25 PROBLEM — E87.0 HYPERNATREMIA: Status: ACTIVE | Noted: 2019-01-01

## 2019-12-25 PROBLEM — E86.0 DEHYDRATION: Status: ACTIVE | Noted: 2019-01-01

## 2019-12-25 NOTE — ED NOTES
16F alexandre cath inserted for output monitoring. Pt tolerated well.      Elmira Artis, RN  12/25/19 [id number]

## 2019-12-25 NOTE — ED NOTES
Bed: 14A-14  Expected date:   Expected time:   Means of arrival:   Comments:  KAI Burgos RN  12/25/19 [id number]

## 2019-12-26 NOTE — PROGRESS NOTES
SPEECH/LANGUAGE PATHOLOGY  BEDSIDE SWALLOWING EVALUATION    PATIENT NAME:  Lavon Crenshaw      :  1932      TODAY'S DATE:  2019  ROOM:  [facility]    SUMMARY OF EVALUATION     DYSPHAGIA DIAGNOSIS:  Mild-moderate oropharyngeal dysphagia      DIET RECOMMENDATIONS:  Dysphagia 1, Pureed solids with  nectar consistency (mildly thick) liquids     FEEDING RECOMMENDATIONS:     Assistance level:  Full assistance is needed during all oral intake      Compensatory strategies recommended: Small bites/sips, Check for oral pocketing and No straw    THERAPY RECOMMENDATIONS:      To monitor oral intake during meals and make recommendations for diet changes as appropriate and A Video Swallow Study (MBSS) is recommended and requires a physician order    Patient report:  Pt reports trouble swallowing however unable to elaborate d/t cognitive status; son present and denies difficulty and overt s/s of pen/asp    Diet prior to admit:    Regular consistency solids with  thin liquids    Communication/Cognition:  Incomplete and Perseveration                PROCEDURE     Consistencies Administered During the Evaluation   Liquids: thin liquid and nectar thick liquid   Solids:  pureed foods and soft solid foods      Method of Intake:   cup, straw, spoon  Fed by clinician- attempts to self feed unsuccessful    Position:   Seated, upright    Current Respiratory Status   room air                RESULTS     Oral Stage:         partials, Decreased mastication due to:  cognitive function and disorganized chewing pattern and Decreased bolus formation resulting in premature pharyngeal spillage.  Poor ability to adequate manage bolus when attempted to masticate    Pharyngeal Stage:      Throat clearing present after presentation of thin liquid, Immediate wet cough was noted after presentation of thin liquid and Multiple swallows were noted after presentation of thin liquid                  Prognosis for improvements is good, This plan will be re-evaluated and revised in 1 week  if warranted. , Patient stated goals: Agreed with above, Treatment goals discussed with Patient and Family, The Patient and Family understand the diagnosis, prognosis and plan of care. [x]The admitting diagnosis and active problem list, as listed below have been reviewed prior to initiation of this evaluation.      ADMITTING DIAGNOSIS: Hypernatremia [E87.0]  Hypernatremia [E87.0]  Dehydration [E86.0]  Dehydration [E86.0]     ACTIVE PROBLEM LIST:   Patient Active Problem List   Diagnosis    Macular degeneration    DVT (deep venous thrombosis) (HCC)    Altered mental status    DINA (acute kidney injury) ([address]    A-fib ([address]    Friction blisters of sole of right foot    Alzheimer's dementia without behavioral disturbance (HCC)    Bilateral lower extremity edema    OAB (overactive bladder)    Other pulmonary embolism without acute cor pulmonale ([address]    Hypernatremia    Dehydration       Teto Vega M.S., CCC-SLP  Speech-Language Pathologist  [id number]  12/26/2019

## 2019-12-26 NOTE — PROGRESS NOTES
Speech Language Pathology      NAME:  Lavon Crenshaw  :  1932  DATE: 2019  ROOM:  [facility]/2055-D    Pt seen for dysphagia management post CBSE. Education provided r/t role of SLP in regards to swallowing function, overt s/s of pen/asp, and aspiration precautions. Pt educated on current diet recommendation of: pureed solids and nectar thick liquids via cup. Compensatory strategies recommended include: slow rate, small bites. Pt and son educated on recommendations for PO diet consistency and MBSS, son voiced an adequate understanding however Pt noted to have confusion. Will follow.        Hypernatremia [E87.0]  Hypernatremia [E87.0]  Dehydration [E86.0]  Dehydration [E86.0]    Angelika Aguilera M.S., [facility]  Speech-Language Pathologist  [id number]  2019

## 2019-12-26 NOTE — ED PROVIDER NOTES
HPI:  12/25/19,   Time: 7:59 PM       Butler Galeazzi is a 80 y.o. male presenting to the ED for shortness of breath. Patient states he is has no symptoms. However, family's concerns been having diarrhea for the past couple of days. They felt he was short of breath. Denies any shortness of breath. Denies any fever, chills, nausea, vomiting, vomiting, change bowel bladder, or any other symptoms. Review of Systems:   Pertinent positives and negatives are stated within HPI, all other systems reviewed and are negative.          --------------------------------------------- PAST HISTORY ---------------------------------------------  Past Medical History:  has a past medical history of Alzheimer disease ([facility] DVT (deep venous thrombosis) ([facility] History of pulmonary embolism, Macular degeneration, Metabolic syndrome, Presence of IVC filter, and Pulmonary emboli ([facility] Past Surgical History:  has a past surgical history that includes hernia repair; Colonoscopy (07/15/2003); polypectomy; and other surgical history (02/19/2019). Social History:  reports that he has never smoked. He has never used smokeless tobacco. He reports previous alcohol use. He reports that he does not use drugs. Family History: family history is not on file. The patients home medications have been reviewed. Allergies: Patient has no known allergies. ---------------------------------------------------PHYSICAL EXAM--------------------------------------    Constitutional/General: Alert and oriented x3, cachectic    Head: Normocephalic and atraumatic  Eyes: PERRL, EOMI, conjunctive normal, sclera non icteric  Mouth: Oropharynx clear, handling secretions, no trismus, dry mucosal membranes  Neck: Supple, full ROM, non tender to palpation in the midline, no stridor, no crepitus, no meningeal signs  Respiratory: Lungs clear to auscultation bilaterally, no wheezes, rales, or rhonchi.  Not in respiratory distress  Cardiovascular:  Regular rate. Regular rhythm. No murmurs, gallops, or rubs. 2+ distal pulses  Chest: No chest wall tenderness  GI:  Abdomen Soft, Non tender, Non distended. +BS. No organomegaly, no palpable masses,  No rebound, guarding, or rigidity. Musculoskeletal: Moves all extremities x 2 , contracted lower extremities  Lymphatic: no lymphadenopathy noted  Neurologic: GCS 15, no focal deficits, symmetric strength 5/5 in the upper and lower extremities bilaterally  Psychiatric: Normal Affect    -------------------------------------------------- RESULTS -------------------------------------------------  I have personally reviewed all laboratory and imaging results for this patient. Results are listed below.      LABS:  Results for orders placed or performed during the hospital encounter of 12/25/19   Comprehensive Metabolic Panel   Result Value Ref Range    Sodium 151 (H) 132 - 146 mmol/L    Potassium 3.2 (L) 3.5 - 5.0 mmol/L    Chloride 112 (H) 98 - 107 mmol/L    CO2 30 (H) 22 - 29 mmol/L    Anion Gap 9 7 - 16 mmol/L    Glucose 120 (H) 74 - 99 mg/dL    BUN 25 (H) 8 - 23 mg/dL    CREATININE 1.1 0.7 - 1.2 mg/dL    GFR Non-African American >60 >=60 mL/min/1.73    GFR African American >60     Calcium 9.1 8.6 - 10.2 mg/dL    Total Protein 6.6 6.4 - 8.3 g/dL    Alb 3.1 (L) 3.5 - 5.2 g/dL    Total Bilirubin 0.4 0.0 - 1.2 mg/dL    Alkaline Phosphatase 82 40 - 129 U/L    ALT 25 0 - 40 U/L    AST 27 0 - 39 U/L   Troponin   Result Value Ref Range    Troponin 0.06 (H) 0.00 - 0.03 ng/mL   CBC Auto Differential   Result Value Ref Range    WBC 8.0 4.5 - 11.5 E9/L    RBC 3.79 (L) 3.80 - 5.80 E12/L    Hemoglobin 10.6 (L) 12.5 - 16.5 g/dL    Hematocrit 35.4 (L) 37.0 - 54.0 %    MCV 93.4 80.0 - 99.9 fL    MCH 28.0 26.0 - 35.0 pg    MCHC 29.9 (L) 32.0 - 34.5 %    RDW 15.1 (H) 11.5 - 15.0 fL    Platelets 829 938 - 917 E9/L    MPV 11.0 7.0 - 12.0 fL    Neutrophils % 79.5 43.0 - 80.0 %    Immature Granulocytes % 0.3 0.0 - 5.0 %    Lymphocytes % 12.4 (L) 20.0 - 42.0 %    Monocytes % 6.8 2.0 - 12.0 %    Eosinophils % 0.6 0.0 - 6.0 %    Basophils % 0.4 0.0 - 2.0 %    Neutrophils Absolute 6.35 1.80 - 7.30 E9/L    Immature Granulocytes # 0.02 E9/L    Lymphocytes Absolute 0.99 (L) 1.50 - 4.00 E9/L    Monocytes Absolute 0.54 0.10 - 0.95 E9/L    Eosinophils Absolute 0.05 0.05 - 0.50 E9/L    Basophils Absolute 0.03 0.00 - 0.20 E9/L   Brain Natriuretic Peptide   Result Value Ref Range    Pro- (H) 0 - 450 pg/mL   Lactic Acid, Plasma   Result Value Ref Range    Lactic Acid 2.4 (H) 0.5 - 2.2 mmol/L   Lactic Acid, Plasma   Result Value Ref Range    Lactic Acid 1.9 0.5 - 2.2 mmol/L   Urinalysis   Result Value Ref Range    Color, UA Yellow Straw/Yellow    Clarity, UA SL CLOUDY Clear    Glucose, Ur Negative Negative mg/dL    Bilirubin Urine SMALL (A) Negative    Ketones, Urine 15 (A) Negative mg/dL    Specific Gravity, UA 1.020 1.005 - 1.030    Blood, Urine LARGE (A) Negative    pH, UA 6.0 5.0 - 9.0    Protein, UA TRACE Negative mg/dL    Urobilinogen, Urine 1.0 <2.0 E.U./dL    Nitrite, Urine Negative Negative    Leukocyte Esterase, Urine SMALL (A) Negative   Microscopic Urinalysis   Result Value Ref Range    Casts RARE /LPF    Mucus, UA Present     WBC, UA 5-10 0 - 5 /HPF    RBC, UA >20 0 - 2 /HPF    Bacteria, UA MANY (A) /HPF       RADIOLOGY:  Interpreted by Radiologist.  CT ABDOMEN PELVIS WO CONTRAST Additional Contrast? None   Final Result   1. Generalized anasarca which makes difficult to evaluate the omental   mesenteric fat planes in the abdomen may in the perirectal spaces are.      2. Findings for fecal rectal retention/impaction. 4. No obstructive uropathy. 5. No dilatation of the biliary tree or biliary ductal system      5. Confluent infiltrate with some consolidations of patch distribution   in the right lower lobe. Pneumonia or aspiration considered.       XR CHEST PORTABLE   Final Result   No acute cardiac pulmonary process. ------------------------- NURSING NOTES AND VITALS REVIEWED ---------------------------   The nursing notes within the ED encounter and vital signs as below have been reviewed by myself. BP (!) 158/81   Pulse 74   Temp 98.5 °F (36.9 °C) (Oral)   Resp 14   Ht 6' 3\" (1.905 m)   Wt 206 lb (93.4 kg)   SpO2 97%   BMI 25.75 kg/m²   Oxygen Saturation Interpretation: Normal    The patients available past medical records and past encounters were reviewed. ------------------------------ ED COURSE/MEDICAL DECISION MAKING----------------------  Medications   0.9 % sodium chloride infusion ( Intravenous New Bag 12/25/19 1840)   ampicillin-sulbactam (UNASYN) 3 g ivpb minibag (has no administration in time range)   potassium chloride 10 mEq/100 mL IVPB (Peripheral Line) (has no administration in time range)   0.9 % sodium chloride bolus (0 mLs Intravenous Stopped 12/25/19 1835)         ED COURSE:       Medical Decision Making:    Labs and imaging reviewed, patient will be admitted. Counseling: The emergency provider has spoken with the patient and discussed todays results, in addition to providing specific details for the plan of care and counseling regarding the diagnosis and prognosis. Questions are answered at this time and they are agreeable with the plan.       --------------------------------- IMPRESSION AND DISPOSITION ---------------------------------    IMPRESSION  1. Diarrhea, unspecified type    2. Aspiration pneumonia, unspecified aspiration pneumonia type, unspecified laterality, unspecified part of lung ([address]    3. Hypernatremia    4. Dehydration        DISPOSITION  Disposition: Admit to med/surg floor  Patient condition is stable    NOTE: This report was transcribed using voice recognition software.  Every effort was made to ensure accuracy; however, inadvertent computerized transcription errors may be present        Bryan Gallagher MD  12/26/19 1956

## 2019-12-26 NOTE — PROGRESS NOTES
for all consistencies administered which minimally cleared with cued double swallow    LARYNGEAL PENETRATION     Laryngeal penetration occurred prior to aspiration. Further details under aspiration section. ASPIRATION    Aspiration occurred DURING the swallow for all consistencies administered due to  delayed laryngeal closure and inadequate laryngeal closure . Aspiration was moderate-marked and occurred consistently . an inconsistent cough was noted         COMPENSATORY STRATEGIES       Compensatory strategies that were attempted included Double swallow, re directive throat clear and cough without effect      STRUCTURAL/FUNCTIONAL ANOMALIES       No structural/functional anomalies were noted      CERVICAL ESOPHAGEAL STAGE :        The cervical esophagus appeared adequate                              Prognosis for improvements is good  This plan will be re-evaluated and revised in 1 week  if warranted. Patient stated goals: Could not state  Treatment goals discussed with Patient  The Patient understand the diagnosis, prognosis and plan of care. [x]The admitting diagnosis and active problem list, as listed below have been reviewed prior to initiation of this evaluation.      ADMITTING DIAGNOSIS: Hypernatremia [E87.0]  Hypernatremia [E87.0]  Dehydration [E86.0]  Dehydration [E86.0]     ACTIVE PROBLEM LIST:   Patient Active Problem List   Diagnosis    Macular degeneration    DVT (deep venous thrombosis) (HCC)    Altered mental status    DINA (acute kidney injury) ([address]    A-fib ([address]    Friction blisters of sole of right foot    Alzheimer's dementia without behavioral disturbance (HCC)    Bilateral lower extremity edema    OAB (overactive bladder)    Other pulmonary embolism without acute cor pulmonale ([facility])    Hypernatremia    Dehydration

## 2019-12-26 NOTE — PROGRESS NOTES
Hospitalist Progress Note      PCP: Tam Mckeon DO    Date of Admission: 12/25/2019      Subjective:     ALERT   Awake  No distress on RA/  Poor historian  No n/v/abd pain        Medications:  Reviewed    Infusion Medications    sodium chloride 100 mL/hr at 12/25/19 1840    dextrose 5% and 0.45% NaCl with KCl 20 mEq 75 mL/hr at 12/25/19 0669     Scheduled Medications    ampicillin-sulbactam  3 g Intravenous Once    apixaban  5 mg Oral BID    aspirin  81 mg Oral Daily    donepezil  10 mg Oral Nightly    memantine  10 mg Oral BID    pantoprazole  40 mg Oral QAM AC    potassium chloride  20 mEq Oral BID    ipratropium-albuterol  1 ampule Inhalation Q4H WA    ampicillin-sulbactam  3 g Intravenous Q6H     PRN Meds: traMADol, acetaminophen      Intake/Output Summary (Last 24 hours) at 12/26/2019 0531  Last data filed at 12/26/2019 0000  Gross per 24 hour   Intake 205 ml   Output 150 ml   Net 55 ml       Physical Exam Performed:    /71   Pulse 58   Temp 97.3 °F (36.3 °C) (Temporal)   Resp 16   Ht 6' 3\" (1.905 m)   Wt 206 lb (93.4 kg)   SpO2 98%   BMI 25.75 kg/m²     General appearance: Chronically ill-appearing  HEENT:  Normal cephalic, atraumatic without obvious deformity. Pupils equal, round, and reactive to light. Extra ocular muscles intact. Conjunctivae/corneas clear. Neck: Supple, with full range of motion. No jugular venous distention. Trachea midline. Respiratory:  Normal respiratory effort. Clear to auscultation, bilaterally without Rales/Wheezes/Rhonchi. Cardiovascular:  Regular rate and rhythm with normal S1/S2 without murmurs, rubs or gallops. Abdomen: Soft, non-tender, non-distended with normal bowel sounds. Musculoskeletal:  No clubbing, cyanosis or edema bilaterally. Full range of motion without deformity. Skin: Skin color, texture, turgor normal.  No rashes or lesions. Neurologic: Very weak lower extremities, able to follow commands.   Capillary Refill: Brisk,< 3 seconds   Peripheral Pulses: +2 palpable, equal bilaterally       Labs:   Recent Labs     12/25/19  1710   WBC 8.0   HGB 10.6*   HCT 35.4*        Recent Labs     12/25/19  1710   *   K 3.2*   *   CO2 30*   BUN 25*   CREATININE 1.1   CALCIUM 9.1     Recent Labs     12/25/19  1710   AST 27   ALT 25   BILITOT 0.4   ALKPHOS 82     No results for input(s): INR in the last 72 hours. Recent Labs     12/25/19  1710 12/25/19  2117   TROPONINI 0.06* 0.04*       Urinalysis:      Lab Results   Component Value Date    NITRU Negative 12/25/2019    WBCUA 5-10 12/25/2019    BACTERIA MANY 12/25/2019    RBCUA >20 12/25/2019    RBCUA NONE 11/08/2012    BLOODU LARGE 12/25/2019    SPECGRAV 1.020 12/25/2019    GLUCOSEU Negative 12/25/2019       Radiology:  CT ABDOMEN PELVIS WO CONTRAST Additional Contrast? None   Final Result   1. Generalized anasarca which makes difficult to evaluate the omental   mesenteric fat planes in the abdomen may in the perirectal spaces are.      2. Findings for fecal rectal retention/impaction. 4. No obstructive uropathy. 5. No dilatation of the biliary tree or biliary ductal system      5. Confluent infiltrate with some consolidations of patch distribution   in the right lower lobe. Pneumonia or aspiration considered. XR CHEST PORTABLE   Final Result   No acute cardiac pulmonary process. Assessment/Plan:    Active Hospital Problems    Diagnosis    Hypernatremia [E87.0]    Dehydration [E86.0]     81 yo mlae hx AFIB  DVT DEMENTIA LIVES WITH SON, VERY MUCH BED BOUND, HTN IVC FILTER admitted with 3 days of anorexia, no abdo pain, but significant diarrhea no blood , ctap showed fecal retention/impaction , cxr negative , labs hb 10.6 sodium 151, k 3.2 bun/creat 25/1.1    DEHYDRATION  DINA  HYPERNATREMIA/HYPOKALEMIA  HX IVC/PE/AFIB  OVERFLOW DIARRHEA/CONSTIPATION/IMPACTION  DEMENTIA  Possible asp pna ?   UTI     PLAN  ivf  Stool c.diff  Iv rocephine  bm regimen  Renal c/s  Speech eval  ucx bcx      DVT Prophylaxis: eliquis  Diet: Diet NPO Effective Now  Code Status: Prior    PT/OT Eval Status: ordered    Dispo - Vicki Gaffney MD

## 2019-12-26 NOTE — H&P
Hospital Medicine History & Physical      PCP: Jeanne Ruvalcaba DO    Date of Admission: 2019    Date of Service: Pt seen/examined on  and Admitted to Inpatient with expected LOS greater than two midnights due to medical therapy. Chief Complaint: Diarrhea, dehydration      History Of Present Illness:   80 y.o. male who presented to [address] [place] [address] with diarrhea and dehydration. 49-year-old male who lives with his son, is pretty much bedridden. Past medical history includes pulmonary embolism, hypertension, early dementia, IVC filter,. Apparently over the past 3 days, the patient has not been able to eat or drink much at all. He has no appetite. He does not complain of any abdominal pain, but he has had significant diarrhea, multiple episodes each day. No blood in the stool, however the son says at one point it was colored red. Because of the diarrhea over the past 3 days he has some slight skin breakdown over sacral area. He denies any chest pain, or palpitations, denies any fever or chills. CT scan results--  CT ABDOMEN PELVIS WO CONTRAST Additional Contrast? None [[id number]]    Resulted: 19    Updated: 19    Narrative:     Patient MRN:  [id number]  : 1932  Age: 80 years  Gender: Male    Order Date:  2019 6:30 PM        TECHNIQUE/NUMBER OF IMAGES/COMPARISON/CLINICAL HISTORY:    CT abdomen pelvis no contrast    Axial images were obtained sagittal and coronal reconstructions. 56 images    69year-old male patient with a history of abdominal pain. Patient  previous hernia repair. Comparison is made with study of 2018. FINDINGS: There are artifacts as the patient could not move away the  upper extends from the areas of interest particular upper abdomen. There are normal size and this for the liver and spleen. Gallbladder  is nondistended. The biliary tree is not dilated. The pancreas  demonstrates atrophy.  There is no 100 mg by mouth 2 times daily    Historical Provider, MD   potassium chloride (KLOR-CON M) 20 MEQ extended release tablet Take 20 mEq by mouth 2 times daily    Historical Provider, MD   furosemide (LASIX) 20 MG tablet Take 20 mg by mouth 2 times daily    Historical Provider, MD   memantine (NAMENDA) 10 MG tablet Take 1 tablet by mouth 2 times daily 11/30/18   Brando Quintanilla,    aspirin 81 MG tablet Take 81 mg by mouth daily    Historical Provider, MD       Allergies:  Patient has no known allergies. Social History:      The patient currently lives with his son. He is either in bed or in a wheelchair. TOBACCO:   reports that he has never smoked. He has never used smokeless tobacco.  ETOH:   reports previous alcohol use. Family History:      Reviewed in detail and negative for DM, CAD, Cancer, CVA. Positive as follows:    History reviewed. No pertinent family history. REVIEW OF SYSTEMS:   Pertinent positives as noted in the HPI. All other systems reviewed and negative. PHYSICAL EXAM:    BP (!) 101/56   Pulse 68   Temp 98.4 °F (36.9 °C) (Temporal)   Resp 16   Ht 6' 3\" (1.905 m)   Wt 206 lb (93.4 kg)   SpO2 90%   BMI 25.75 kg/m²     General appearance: Chronically ill-appearing  HEENT:  Normal cephalic, atraumatic without obvious deformity. Pupils equal, round, and reactive to light. Extra ocular muscles intact. Conjunctivae/corneas clear. Neck: Supple, with full range of motion. No jugular venous distention. Trachea midline. Respiratory:  Normal respiratory effort. Clear to auscultation, bilaterally without Rales/Wheezes/Rhonchi. Cardiovascular:  Regular rate and rhythm with normal S1/S2 without murmurs, rubs or gallops. Abdomen: Soft, non-tender, non-distended with normal bowel sounds. Musculoskeletal:  No clubbing, cyanosis or edema bilaterally. Full range of motion without deformity. Skin: Skin color, texture, turgor normal.  No rashes or lesions.   Neurologic: Very weak lower

## 2019-12-26 NOTE — PROGRESS NOTES
OCCUPATIONAL THERAPY INITIAL EVALUATION      Date:2019  Patient Name: Daniela Guardado  MRN: [id number]  : 1932  Room: [address] [place], [place] [address]    Evaluating OT: KEYUR Cali, OTR/L [id number]    AM-PAC Daily Activity Raw Score:   Recommended Adaptive Equipment:  TBD     Diagnosis: hypernatremia, dehydration   Surgery: n/a   Pertinent Medical History: alzheimers, macular degeneration, PE/DVT (IVC Filter)   Precautions:  Falls, contact (for C-diff), wounds, pureed diet, cognition     Home Living: Son present to provide prior history. Pt lives with wife in a 1 story home with ramp step(s) to enter; bed/bath on main level   Bathroom setup: n/a, pt sponge bathes   Equipment owned: HB, w/c, ww, cane, BSC  Prior Level of Function: assist with ADLs/IADLs, an aide comes in 2x/day, 6 days/wk; using w/c and  Standing lift machine for functional transfers   Driving: no    Pain Level: 0/10  Cognition: A&O: 1/4 (self); Follows 1 step directions with increased time, repetition, and visual/hand over hand demonstration   Memory:  poor   Sequencing:  poor   Problem solving:  poor   Judgement/safety:  poor     Functional Assessment:   Initial Eval Status  Date: 19 Treatment Status  Date: Short Term Goals  Treatment frequency: PRN    Feeding Dep (assist to self feed due to pt inability to reach mouth, RN notified)   Mod A   Grooming Dep (assit to wash face, but pt unable to reach)   Mod A   UB Dressing Dep    Mod A   LB Dressing Dep (assist with B socks)  Mod A    Bathing Dep (simulated)  Mod A    Toileting dep  Mod A   Bed Mobility  Log roll: Max x 2  Supine to sit: Max x 2   Sit to supine: Max x 2   Log roll Max A  Supine to sit:  Max A   Sit to supine: Max A   Functional Transfers Sit to stand:NT   Stand to sit:NT  Commode: NT  Mod A   Functional Mobility NT  Mod A   Balance Sitting:     Static:  Max A (progressed to Min A, vc's for hand placement and posture due to R sided and retro lean)    Dynamic:Max A  Standing: NT Activity Tolerance poor (hallunination, difficulty following commands, decreased alertness, pt able to sit EOB ~15 mins)     Visual/  Perceptual Glasses: no                UE PROM: BUE: pt demo'ing difficulty following commands for AROM, therapist can passively flex elbow WFL and extension to grossly -5', shoulder to grossly 90', pt appears to resistance ROM and therapist unable to formally assess  Strength: RUE: grossly 4-/5 LUE: grossly 4-/5   Strength: fair  Fine Motor Coordination: decreased    Hearing: WFL  Sensation:  No c/o numbness/tingling  Tone:  WFL  Edema: BLEs and                             Comments/Treatment: Cleared by RN to see pt. Upon arrival, patient supine in bed. Son present. Max x 2 for supine-sit EOB. Required re-orientation to year/month/place. Required assist with LE dressing. Pt able to sit EOB ~15 mins. Required assist with grooming task and self feeding. At end of session, patient supine in bed with call light and phone within reach, all lines and tubes intact. Pt would benefit from continued OT to increase functional independence and quality of life. Eval Complexity: moderate  · History: Expanded chart review of medical records and additional review of physical, cognitive, or psychosocial history related to current functional performance  · Exam: 3+ performance deficits  · Assistance/Modification: mod/max assistance or modifications required to perform tasks. May have comorbidities that affect occupational performance.     Assessment of current deficits   Functional mobility [x]  ADLs [x] Strength [x]  Cognition [x]  Functional transfers  [x] IADLs [x] Safety Awareness [x]  Endurance [x]  Fine Motor Coordination [] Balance [x] Vision/perception [] Sensation []   Gross Motor Coordination [] ROM [] Delirium []                  Motor Control []    Plan of Care:   ADL retraining [x]   Equipment needs [x]   Neuromuscular re-education [x] Energy Conservation Techniques [x]  Functional Transfer training [x] Patient and/or Family Education [x]  Functional Mobility training [x]  Environmental Modifications [x]  Cognitive re-training [x]   Compensatory techniques for ADLs [x]  Splinting Needs []   Positioning to improve overall function [x]   Therapeutic Activity [x]  Therapeutic Exercise  [x]  Visual/Perceptual: []    Delirium prevention/treatment  []   Other:  []    Rehab Potential: Good for established goals, pt. assisted in establishment of goals. Patient and/or family were instructed on diagnosis, prognosis/goals and plan of care. Demonstrated fair understanding. [] Malnutrition indicators have been identified and nursing has been notified to ensure a dietitian consult is ordered. Evaluation time includes thorough review of current medical information, gathering information on past medical & social history & PLOF, completion of standardized testing, informal observation of tasks, consultation with other medical professions/disciplines, assessment of data & development of POC/goals. Tx. Time in: 1:15  Tx.  Time out: 1:40  moderate Evaluation + 25 timed treatment minutes    Viola Hicks, [facility], OTR/L [id number]

## 2019-12-26 NOTE — CONSULTS
Department of Internal Medicine  Nephrology Attending Consult Note      Reason for Consult: Hypernatremia  Requesting Physician:  Dr. Jyothi Monaco: Diarrhea    History Obtained From:  patient, electronic medical record    HISTORY OF PRESENT ILLNESS: Briefly Mr. Kena Nye is a 80-year-old man with history of Alzheimer's disease, DVT and PE status post IVC filter placement, macular degeneration, who was admitted on December 25, 2019 after he was brought to the ER with multiple episode of diarrhea and poor oral intake, after evaluation he was found to have a sodium 151 mEq/L reason for this consultation. His medications prior to admission included Lasix 20 mg p.o. twice daily.      Past Medical History:        Diagnosis Date    Alzheimer disease ([address]     DVT (deep venous thrombosis) (HCC)     History of pulmonary embolism     WITH RIGHT VENTRICULAR INFARCTION    Macular degeneration     Metabolic syndrome     Presence of IVC filter     Pulmonary emboli (HCC)      Past Surgical History:        Procedure Laterality Date    COLONOSCOPY  07/15/2003    HERNIA REPAIR      OTHER SURGICAL HISTORY  02/19/2019    Dr. Bertha Atkins- IVC Filter    POLYPECTOMY      X 2     Current Medications:    Current Facility-Administered Medications: cefTRIAXone (ROCEPHIN) 1 g in sterile water 10 mL IV syringe, 1 g, Intravenous, Q24H  senna (SENOKOT) tablet 17.2 mg, 2 tablet, Oral, BID  lactulose (CHRONULAC) 10 GM/15ML solution 30 g, 30 g, Oral, 6 times per day  0.9 % sodium chloride infusion, , Intravenous, Continuous  apixaban (ELIQUIS) tablet 5 mg, 5 mg, Oral, BID  aspirin EC tablet 81 mg, 81 mg, Oral, Daily  donepezil (ARICEPT) tablet 10 mg, 10 mg, Oral, Nightly  memantine (NAMENDA) tablet 10 mg, 10 mg, Oral, BID  pantoprazole (PROTONIX) tablet 40 mg, 40 mg, Oral, QAM AC  potassium chloride (KLOR-CON M) extended release tablet 20 mEq, 20 mEq, Oral, BID  traMADol (ULTRAM) tablet 50 mg, 50 mg, Oral, Q6H PRN  acetaminophen (TYLENOL) tablet 650 mg, 650 mg, Oral, Q4H PRN  dextrose 5 % and 0.45 % NaCl with KCl 20 mEq infusion, , Intravenous, Continuous  ipratropium-albuterol (DUONEB) nebulizer solution 1 ampule, 1 ampule, Inhalation, Q4H WA  Allergies:  Patient has no known allergies. Social History:    TOBACCO:   reports that he has never smoked. He has never used smokeless tobacco.  ETOH:   reports previous alcohol use. Family History:   History reviewed. No pertinent family history.      REVIEW OF SYSTEMS:    Review of systems not obtained due to patient factors - mental status    PHYSICAL EXAM:      Vitals:    VITALS:  BP (!) 131/58   Pulse 73   Temp 97.7 °F (36.5 °C) (Temporal)   Resp 16   Ht 6' 3\" (1.905 m)   Wt 206 lb (93.4 kg)   SpO2 96%   BMI 25.75 kg/m²   24HR INTAKE/OUTPUT:    Intake/Output Summary (Last 24 hours) at 12/26/2019 1124  Last data filed at 12/26/2019 0549  Gross per 24 hour   Intake 205 ml   Output 250 ml   Net -45 ml       Constitutional: Patient is lethargic, follow simple commands  HEENT: Pupils are equal reactive, mucous membranes are dry  Respiratory: Lungs are clear  Cardiovascular/Edema: Heart sounds are regular rate  Gastrointestinal: Abdomen soft nontender  Neurologic: Patient is lethargic  Skin: No lesions  Other: No edema    DATA:    CBC:   Lab Results   Component Value Date    WBC 6.5 12/26/2019    RBC 3.50 12/26/2019    HGB 9.9 12/26/2019    HCT 32.8 12/26/2019    MCV 93.7 12/26/2019    MCH 28.3 12/26/2019    MCHC 30.2 12/26/2019    RDW 15.3 12/26/2019     12/26/2019    MPV 10.5 12/26/2019     CMP:    Lab Results   Component Value Date     12/26/2019    K 3.6 12/26/2019    K 4.1 08/30/2019     12/26/2019    CO2 26 12/26/2019    BUN 22 12/26/2019    CREATININE 0.9 12/26/2019    GFRAA >60 12/26/2019    LABGLOM >60 12/26/2019    GLUCOSE 107 12/26/2019    GLUCOSE 84 04/25/2012    PROT 6.1 12/26/2019    LABALBU 2.9 12/26/2019    LABALBU 4.1 04/25/2012 CALCIUM 8.8 12/26/2019    BILITOT 0.3 12/26/2019    ALKPHOS 71 12/26/2019    AST 27 12/26/2019    ALT 25 12/26/2019     Magnesium:    Lab Results   Component Value Date    MG 2.1 12/26/2019     Phosphorus:    Lab Results   Component Value Date    PHOS 2.1 12/26/2019     Radiology Review:      CT abdomen and pelvis without contrast December 25, 2019   1. Generalized anasarca which makes difficult to evaluate the omental   mesenteric fat planes in the abdomen may in the perirectal spaces are.       2. Findings for fecal rectal retention/impaction.       4. No obstructive uropathy.       5. No dilatation of the biliary tree or biliary ductal system       5. Confluent infiltrate with some consolidations of patch distribution   in the right lower lobe. Pneumonia or aspiration considered. Chest x-ray December 25, 2019   No acute cardiac pulmonary process. IMPRESSION/RECOMMENDATIONS:      Briefly Mr. Kena Nye is a 80-year-old man with history of Alzheimer's disease, DVT and PE status post IVC filter placement, EF 55-60% with stage I DD, macular degeneration, who was admitted on December 25, 2019 after he was brought to the ER with multiple episode of diarrhea and poor oral intake, after evaluation he was found to have a sodium 151 mEq/L reason for this consultation. His medications prior to admission included Lasix 20 mg p.o. twice daily. 1. Hypernatremia, multifactorial, secondary to diarrhea and decreased oral intake along with diuretics  2. DINA stage I, volume responsive prerenal DINA, resolved  3. Hypokalemia, multifactorial due to diuretics, poor oral intake. 4. Hypophosphatemia, due to poor oral intake, rule out vitamin D deficiency  5. Fecal impaction per CT, on lactulose  6. History of DVT and PE, status post IVC filter placement, on apixaban  7.  Normocytic anemia    Plan:    · Change IV fluids to D5% + KCL 30 meq L at 125 cc/hour   · Replace phosphorus  · Replace potassium  · Monitor sodium

## 2019-12-26 NOTE — PROGRESS NOTES
standing: NT     Pt is A & O x 1 (self only)  Sensation: unable to formally assess due to cognition  Coordination: unable to formally assess due to cognition  Edema: moderate non-pitting edema in B feet and hands    ASSESSMENT    Patient education  Pt educated on participation in bed mobility, importance of positioning,     Patient response to education:   Pt verbalized understanding Pt demonstrated skill Pt requires further education in this area   no no yes     Comments:  Pt resting semi-supine upon arrival, son agreeable to PT session. Pt presenting with apparent hip flexion bilaterally. Pt resistive to PROM of B LEs while in supine, PT able to improve knee extension with PROM to B LEs. Pt requiring Max A to maintain upright posture upon initial sitting due to strong R sided and posterior lean, minimal use of B UEs to assist with supporting self observed following verbal and tactile cues for placement to support self. Pt son reporting pt is typically more alert and able to assist. Son providing description of standing lift used in home to assist with sit<>stand and surface to surface transfers; reports pt is non ambulatory at baseline. Pt returned to bed upon completion of session with all needs in reach and TAPS system replaced, pillow between knees and prevalon boots replaced for position and and decreased risk of breakdown. PT will initiate 3 session trial to maximize pt participation in bed mobility and sitting balance. Pts/family goals:  Son's goal is for pt to return home at [facility]    Patient and or family understand(s) diagnosis, prognosis, and plan of care:  Yes    PLAN  PT care will be provided in accordance with the objectives noted above. Whenever appropriate, clear delegation orders will be provided for nursing staff. Exercises and functional mobility practice will be used as well as appropriate assistive devices or modalities to obtain goals.  Patient and family education will also be administered as needed.     Frequency of treatments: 3 session trial    Time in: 1310  Time out: 111 Harman Solis PT, DPT  [id number]

## 2019-12-27 NOTE — PROGRESS NOTES
Dr Rock Schilder served with lab results after TLC was inserted via surgical team. Heparin on hold for 1hr and then rate will drop per protocol

## 2019-12-27 NOTE — PROGRESS NOTES
Hospitalist Progress Note      PCP: Warren Cadena DO    Date of Admission: 12/25/2019    Chief Complaint: Weakness, diarrhea, fecal impaction, possible pneumonia    Hospital Course: This patient was admitted to the hospital 80years old son said he had diarrhea and extremely poor oral intake. He actually seems to have a fecal impaction, which we have ordered an enema for. There is pneumonia on the CT of the abdomen, he is on Rocephin. He is very debilitated with consulted physical therapy. Subjective: Patient said he feels 50% better than he was admitted. Medications:  Reviewed    Infusion Medications    IV infusion builder 100 mL/hr at 12/27/19 0425    heparin (porcine) 15 Units/kg/hr (12/27/19 0811)     Scheduled Medications    lactulose  1 enema Rectal Once    cefTRIAXone (ROCEPHIN) IV  1 g Intravenous Q24H    pantoprazole  40 mg Intravenous Daily    And    sodium chloride (PF)  10 mL Intravenous Daily    aspirin  300 mg Rectal Daily    ipratropium-albuterol  1 ampule Inhalation Q4H WA     PRN Meds: acetaminophen, HYDROmorphone, heparin (porcine), heparin (porcine)      Intake/Output Summary (Last 24 hours) at 12/27/2019 0958  Last data filed at 12/27/2019 0546  Gross per 24 hour   Intake 2737.6 ml   Output 425 ml   Net 2312.6 ml       Exam:    BP (!) 119/56   Pulse 81   Temp 97.4 °F (36.3 °C) (Temporal)   Resp 16   Ht 6' 3\" (1.905 m)   Wt 206 lb (93.4 kg)   SpO2 98%   BMI 25.75 kg/m²     General appearance: No apparent distress, appears stated age and cooperative. HEENT: Pupils equal, round, and reactive to light. Conjunctivae/corneas clear. Neck: Supple, with full range of motion. No jugular venous distention. Trachea midline. Respiratory:  Normal respiratory effort. Clear to auscultation, bilaterally without Rales/Wheezes/Rhonchi. Cardiovascular: Regular rate and rhythm with normal S1/S2 without murmurs, rubs or gallops.   Abdomen: Soft, non-tender, non-distended with normal bowel sounds. Musculoskeletal: No clubbing, cyanosis or edema bilaterally. Full range of motion without deformity. Skin: Skin color, texture, turgor normal.  No rashes or lesions. Neurologic: Able to follow commands, wakes up to his name, but not really alert or oriented. Capillary Refill: Brisk,< 3 seconds   Peripheral Pulses: +2 palpable, equal bilaterally       Labs:   Recent Labs     12/26/19  0741 12/26/19  1610 12/27/19  0425   WBC 6.5 6.8 6.6   HGB 9.9* 10.1* 9.5*   HCT 32.8* 33.7* 30.6*    280 253     Recent Labs     12/26/19  0741 12/26/19  1146 12/27/19  0425   * 152* 143   K 3.6 3.3* 2.9*   * 113* 107   CO2 26 24 22   BUN 22 21 19   CREATININE 0.9 0.8 0.9   CALCIUM 8.8 8.6 8.1*   PHOS 2.1*  --  4.1     Recent Labs     12/25/19  1710 12/26/19  0741 12/27/19  0425   AST 27 27 22   ALT 25 25 21   BILITOT 0.4 0.3 0.3   ALKPHOS 82 71 66     No results for input(s): INR in the last 72 hours.   Recent Labs     12/25/19  1710 12/25/19  2117   TROPONINI 0.06* 0.04*       Assessment/Plan:    Active Hospital Problems    Diagnosis Date Noted    Hypernatremia [E87.0] 12/25/2019    Dehydration [E86.0] 12/25/2019   Pneumonia  Plan fecal impaction    Plan--  CT chest to assess the pneumonia  Enema for the impaction  PT OT  Goal skilled on discharge    DVT Prophylaxis: elliquis  Diet: Diet NPO Effective Now  Code Status: Prior    PT/OT Eval Status: consulted    Dispo - goal skilled    Aurora Carranza DO

## 2019-12-27 NOTE — PROGRESS NOTES
12/27/2019    GLUCOSE 84 04/25/2012    PROT 5.8 12/27/2019    LABALBU 2.7 12/27/2019    LABALBU 4.1 04/25/2012    CALCIUM 8.1 12/27/2019    BILITOT 0.3 12/27/2019    ALKPHOS 66 12/27/2019    AST 22 12/27/2019    ALT 21 12/27/2019     Magnesium:    Lab Results   Component Value Date    MG 1.9 12/27/2019     Phosphorus:    Lab Results   Component Value Date    PHOS 4.1 12/27/2019     Radiology Review:      CT abdomen and pelvis without contrast December 25, 2019   1. Generalized anasarca which makes difficult to evaluate the omental   mesenteric fat planes in the abdomen may in the perirectal spaces are.       2. Findings for fecal rectal retention/impaction.       4. No obstructive uropathy.       5. No dilatation of the biliary tree or biliary ductal system       5. Confluent infiltrate with some consolidations of patch distribution   in the right lower lobe. Pneumonia or aspiration considered. Chest x-ray December 25, 2019   No acute cardiac pulmonary process. BRIEF SUMMARY OF INITIAL CONSULT:    Briefly Mr. 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 His medications prior to admission included Lasix 20 mg p.o. twice daily. IMPRESSION/RECOMMENDATIONS:      1. Hypernatremia, multifactorial, secondary to diarrhea and decreased oral intake along with diuretics. Sodium levels improved. 2. DINA stage I, volume responsive prerenal DINA, resolved  3. Hypokalemia, multifactorial due to diuretics, poor oral intake. 4. Hypophosphatemia, due to poor oral intake, rule out vitamin D deficiency, phosphorus levels improved. 5. Fecal impaction per CT, on lactulose  6.  History of DVT and PE,

## 2019-12-27 NOTE — CARE COORDINATION
[facility] however has not started the [facility] yet as it looks like the patients has ++ blod cultures I will follow and Plan to start the precert once the patient is more stable.  I will follow Thanks [place]

## 2019-12-27 NOTE — PROCEDURES
vein  Patient position: reverse Trendelenburg  Catheter type: triple lumen  Catheter size: 7 Fr  Pre-procedure: landmarks identified  Ultrasound guidance: yes  Sterile ultrasound techniques: sterile gel and sterile probe covers were used  Number of attempts: 4  Successful placement: yes  Post-procedure: line sutured and dressing applied  Assessment: blood return through all ports and free fluid flow  Patient tolerance: Patient tolerated the procedure well with no immediate complications  Comments: attempted Left IJ without success due to collapsible veins,  re-prepped and drapped and able to access R femoral vein on first stick in groin      Will get CXR to confirm no PTX after multiple attempts in L IJ without success.   R femoral accessed under US guidance    Lukas Waddell DO  12/27/2019

## 2019-12-27 NOTE — PROGRESS NOTES
Pt seen for dysphagia. Results and recommendations of swallow study reviewed with patient. Laryngeal elevation and tongue base retraction exercises completed fair-poor. Pt was instructed to continue exercises independently throughout the day.   Will continue

## 2019-12-27 NOTE — CONSULTS
mL Intravenous Daily    aspirin  300 mg Rectal Daily    ipratropium-albuterol  1 ampule Inhalation Q4H WA     Continuous Infusions:   IV infusion builder      heparin (porcine) 15 Units/kg/hr (12/27/19 1543)     PRN Meds:sodium chloride flush, acetaminophen, HYDROmorphone, heparin (porcine), heparin (porcine)    Allergies:  Patient has no known allergies.     Social History:   Social History     Socioeconomic History    Marital status:      Spouse name: None    Number of children: None    Years of education: None    Highest education level: None   Occupational History    None   Social Needs    Financial resource strain: None    Food insecurity:     Worry: None     Inability: None    Transportation needs:     Medical: None     Non-medical: None   Tobacco Use    Smoking status: Never Smoker    Smokeless tobacco: Never Used   Substance and Sexual Activity    Alcohol use: Not Currently     Comment: occasional    Drug use: Never    Sexual activity: Not Currently   Lifestyle    Physical activity:     Days per week: None     Minutes per session: None    Stress: None   Relationships    Social connections:     Talks on phone: None     Gets together: None     Attends Orthodox service: None     Active member of club or organization: None     Attends meetings of clubs or organizations: None     Relationship status: None    Intimate partner violence:     Fear of current or ex partner: None     Emotionally abused: None     Physically abused: None     Forced sexual activity: None   Other Topics Concern    None   Social History Narrative    ** Merged History Encounter **            Family History:   No coronary artery disease or diabetes    REVIEW OF SYSTEMS:    14 ROS negative unless otherwise specified in the HPI    PHYSICAL EXAM:    Vitals:    BP (!) 129/56   Pulse 71   Temp 97.6 °F (36.4 °C) (Temporal)   Resp 16   Ht 6' 3\" (1.905 m)   Wt 206 lb (93.4 kg)   SpO2 96%   BMI 25.75 kg/m²

## 2019-12-28 NOTE — PROGRESS NOTES
Hospitalist Progress Note      PCP: Mallory Dang DO    Date of Admission: 12/25/2019    Chief Complaint: Hypernatremia, dehydration    Hospital Course: This is a 80-year-old male with history of pulmonary embolism and dementia, who was not eating well at home was not drinking and became dehydrated. He also had diarrhea and fecal impaction. Today he looks much better, renal and infectious disease both are on the case. I did call to discuss case with his son and his son was not home. Subjective: Confused no complaints      Medications:  Reviewed    Infusion Medications    IV infusion builder 125 mL/hr at 12/28/19 1238    heparin (porcine) Stopped (12/28/19 1447)     Scheduled Medications    potassium phosphate IVPB  10 mmol Intravenous Once    cefTRIAXone (ROCEPHIN) IV  1 g Intravenous Q24H    pantoprazole  40 mg Intravenous Daily    And    sodium chloride (PF)  10 mL Intravenous Daily    aspirin  300 mg Rectal Daily    ipratropium-albuterol  1 ampule Inhalation Q4H WA     PRN Meds: sodium chloride flush, acetaminophen, HYDROmorphone, heparin (porcine), heparin (porcine)      Intake/Output Summary (Last 24 hours) at 12/28/2019 1626  Last data filed at 12/28/2019 0630  Gross per 24 hour   Intake 1480 ml   Output 525 ml   Net 955 ml       Exam:    /63   Pulse 53   Temp 97.7 °F (36.5 °C) (Temporal)   Resp 16   Ht 6' 3\" (1.905 m)   Wt 175 lb 3.2 oz (79.5 kg)   SpO2 97%   BMI 21.90 kg/m²     General appearance: No apparent distress, appears stated age and cooperative. HEENT: Pupils equal, round, and reactive to light. Conjunctivae/corneas clear. Neck: Supple, with full range of motion. No jugular venous distention. Trachea midline. Respiratory:  Normal respiratory effort. Clear to auscultation, bilaterally without Rales/Wheezes/Rhonchi. Cardiovascular: Regular rate and rhythm with normal S1/S2 without murmurs, rubs or gallops.   Abdomen: Soft, non-tender, non-distended with normal

## 2019-12-28 NOTE — PROGRESS NOTES
DVT and PE, status post IVC filter placement, on apixaban  6.  Normocytic anemia    Plan:    · Change IV fluids to D5W  + KCL 20 meq L at 125cc/hour    · Replace phosphorus  · Obtain vitamin D 25 levels  · Obtain iron studies, B12 and folate  · Continue to monitor sodium levels

## 2019-12-29 NOTE — PROGRESS NOTES
Hospitalist Progress Note      PCP: Nicolas Mares DO    Date of Admission: 12/25/2019    Chief Complaint: * hypernatremia    Hospital Course: *This is a 26-year-old male with history of pulmonary embolism and dementia, who was not eating well at home was not drinking and became dehydrated. He also had diarrhea and fecal impaction. IVAB discontinued, blood cultures were probably contaminant    Subjective: * confused      Medications:  Reviewed    Infusion Medications    IV infusion builder 125 mL/hr at 12/29/19 0618    heparin (porcine) 9 Units/kg/hr (12/29/19 0507)     Scheduled Medications    sodium phosphate IVPB  10 mmol Intravenous Once    linezolid  600 mg Intravenous Q12H    pantoprazole  40 mg Intravenous Daily    And    sodium chloride (PF)  10 mL Intravenous Daily    aspirin  300 mg Rectal Daily    ipratropium-albuterol  1 ampule Inhalation Q4H WA     PRN Meds: sodium chloride flush, acetaminophen, HYDROmorphone, heparin (porcine), heparin (porcine)      Intake/Output Summary (Last 24 hours) at 12/29/2019 1522  Last data filed at 12/29/2019 0618  Gross per 24 hour   Intake 4291 ml   Output 300 ml   Net 3991 ml       Exam:    BP (!) 114/53   Pulse 71   Temp 97.3 °F (36.3 °C) (Temporal)   Resp 18   Ht 6' 3\" (1.905 m)   Wt 175 lb 3.2 oz (79.5 kg)   SpO2 99%   BMI 21.90 kg/m²           Gen:  Well developed  HEENT: NC/AT, moist mucous membranes, no oropharyngeal erythema or exudate  Neck: supple, trachea midline, no anterior cervical or SC LAD  Heart:  Normal s1/s2, RRR, no murmurs, gallops, or rubs.    Lungs:  cta * bilaterally,   Abd: bowel sounds present, soft, nontender, nondistended, no masses  Extrem:  No clubbing, cyanosis,  pos** edema  Skin: no rashes or lesions  Psych: Not oriented to date, Not oriented to person or Not oriented to place    Capillary Refill: Brisk,< 3 seconds   Peripheral Pulses: +2 palpable, equal bilaterally               Labs:   Recent Labs     12/27/19  [id number]

## 2019-12-29 NOTE — PROGRESS NOTES
Department of Internal Medicine  Nephrology Attending Progress Note      SUBJECTIVE: We are following Mr. Marco Gordon for hypernatremia and DINA. Reports no complaints.        PHYSICAL EXAM:      Vitals:    VITALS:  BP (!) 114/53   Pulse 71   Temp 97.3 °F (36.3 °C) (Temporal)   Resp 18   Ht 6' 3\" (1.905 m)   Wt 175 lb 3.2 oz (79.5 kg)   SpO2 99%   BMI 21.90 kg/m²   24HR INTAKE/OUTPUT:      Intake/Output Summary (Last 24 hours) at 12/29/2019 1229  Last data filed at 12/29/2019 0618  Gross per 24 hour   Intake 4291 ml   Output 300 ml   Net 3991 ml       Constitutional: Patient is lethargic, follow simple commands  HEENT: Pupils are equal reactive, mucous membranes are dry  Respiratory: Lungs are clear  Cardiovascular/Edema: Heart sounds are regular rate  Gastrointestinal: Abdomen soft nontender  Neurologic: Patient is lethargic  Skin: No lesions  Other: No edema    Scheduled Meds:   pantoprazole  40 mg Intravenous Daily    And    sodium chloride (PF)  10 mL Intravenous Daily    aspirin  300 mg Rectal Daily    ipratropium-albuterol  1 ampule Inhalation Q4H WA     Continuous Infusions:   IV infusion builder 125 mL/hr at 12/29/19 0618    heparin (porcine) 9 Units/kg/hr (12/29/19 0507)     PRN Meds:.sodium chloride flush, acetaminophen, HYDROmorphone, heparin (porcine), heparin (porcine)    DATA:    CBC:   Lab Results   Component Value Date    WBC 6.0 12/29/2019    RBC 2.88 12/29/2019    HGB 8.1 12/29/2019    HCT 26.1 12/29/2019    MCV 90.6 12/29/2019    MCH 28.1 12/29/2019    MCHC 31.0 12/29/2019    RDW 15.1 12/29/2019     12/29/2019    MPV 10.6 12/29/2019     CMP:    Lab Results   Component Value Date     12/29/2019    K 3.8 12/29/2019    K 4.1 08/30/2019     12/29/2019    CO2 24 12/29/2019    BUN 9 12/29/2019    CREATININE 0.7 12/29/2019    GFRAA >60 12/29/2019    LABGLOM >60 12/29/2019    GLUCOSE 106 12/29/2019    GLUCOSE 84 04/25/2012    PROT 5.7 12/29/2019    LABALBU 2.5 12/29/2019 LABALBU 4.1 04/25/2012    CALCIUM 8.0 12/29/2019    BILITOT 0.4 12/29/2019    ALKPHOS 103 12/29/2019    AST 21 12/29/2019    ALT 15 12/29/2019     Magnesium:    Lab Results   Component Value Date    MG 1.9 12/29/2019     Phosphorus:    Lab Results   Component Value Date    PHOS 1.8 12/29/2019     Radiology Review:      CT abdomen and pelvis without contrast December 25, 2019   1. Generalized anasarca which makes difficult to evaluate the omental   mesenteric fat planes in the abdomen may in the perirectal spaces are.       2. Findings for fecal rectal retention/impaction.       4. No obstructive uropathy.       5. No dilatation of the biliary tree or biliary ductal system       5. Confluent infiltrate with some consolidations of patch distribution   in the right lower lobe. Pneumonia or aspiration considered. Chest x-ray December 25, 2019   No acute cardiac pulmonary process. BRIEF SUMMARY OF INITIAL CONSULT:    Briefly Mr. 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 His medications prior to admission included Lasix 20 mg p.o. twice daily. Problems resolved:    · DINA stage I, volume responsive prerenal DINA, resolved    IMPRESSION/RECOMMENDATIONS:      1. Hypernatremia, multifactorial, secondary to diarrhea and decreased oral intake along with diuretics. Sodium levels have crease again. We will continue with free water replacement. 2. Hypokalemia, multifactorial due to diuretics, poor oral intake. 3. Hypophosphatemia, due to poor oral intake, rule out vitamin D deficiency, volume repletion  4. Fecal impaction per CT, on lactulose  5.  History of DVT and PE, status post IVC filter placement, on apixaban  6.  Normocytic anemia    Plan:    · Continue IV fluids to D5W  + KCL 20 meq L at 125cc/hour    · Replace phosphorus  · Obtain vitamin D 25 levels  · Obtain iron studies, B12 and folate  · Continue to monitor sodium levels

## 2019-12-30 NOTE — PLAN OF CARE
Problem: Falls - Risk of:  Goal: Will remain free from falls  Description  Will remain free from falls  12/30/2019 1053 by Jose Alfredo Mesa RN  Outcome: Met This Shift  12/29/2019 2350 by Juhi Decker RN  Outcome: Met This Shift  Goal: Absence of physical injury  Description  Absence of physical injury  12/30/2019 1053 by Jose Alfredo Mesa RN  Outcome: Met This Shift  12/29/2019 2350 by Juhi Decker RN  Outcome: Met This Shift

## 2019-12-30 NOTE — PROGRESS NOTES
Hospitalist Progress Note      PCP: Tamiko Pires DO    Date of Admission: 12/25/2019    Chief Complaint: * hypernatremia    Hospital Course: This is a 59-year-old male with history of pulmonary embolism and dementia, who was not eating well at home was not drinking and became dehydrated.  He also had diarrhea and fecal impaction. IVAB discontinued, blood cultures were probably contaminant*  Per ID. He failed the swallow eval, and speech recommended that he  Have a repeat swallow eval in 5 days, will order for 12/31. His sodium has now returned to normal.     Subjective: **confused      Medications:  Reviewed    Infusion Medications    dextrose 5% and 0.9% NaCl with KCl 20 mEq 75 mL/hr at 12/30/19 1357    heparin (porcine) 9 Units/kg/hr (12/30/19 0610)     Scheduled Medications    potassium phosphate IVPB  10 mmol Intravenous Once    linezolid  600 mg Intravenous Q12H    pantoprazole  40 mg Intravenous Daily    And    sodium chloride (PF)  10 mL Intravenous Daily    aspirin  300 mg Rectal Daily    ipratropium-albuterol  1 ampule Inhalation Q4H WA     PRN Meds: sodium chloride flush, acetaminophen, HYDROmorphone, heparin (porcine), heparin (porcine)      Intake/Output Summary (Last 24 hours) at 12/30/2019 1407  Last data filed at 12/30/2019 5326  Gross per 24 hour   Intake 2824 ml   Output 750 ml   Net 2074 ml       Exam:    BP (!) 142/54   Pulse 68   Temp 97.5 °F (36.4 °C) (Temporal)   Resp 16   Ht 6' 3\" (1.905 m)   Wt 175 lb 3.2 oz (79.5 kg)   SpO2 98%   BMI 21.90 kg/m²     Gen:  Well developed  HEENT: NC/AT, moist mucous membranes,   Neck: supple, trachea midline, no anterior cervical or SC LAD  Heart:  Normal s1/s2, RRR, no murmurs, gallops, or rubs.    Lungs:  cta * bilaterally,   Abd: bowel sounds present, soft, nontender, nondistended, no masses  Extrem:  No clubbing, cyanosis,  pos** edema  Skin: no rashes or lesions  Psych: Not oriented to date, Not oriented to person or Not oriented to place    Capillary Refill: Brisk,< 3 seconds   Peripheral Pulses: +2 palpable, equal bilaterally                  Labs:   Recent Labs     12/28/19 0432 12/29/19  0431 12/30/19  0530   WBC 6.7 6.0 5.9   HGB 8.9* 8.1* 8.1*   HCT 28.3* 26.1* 25.7*    232 247     Recent Labs     12/28/19 0432 12/28/19  2115 12/29/19  0431 12/30/19  0530   * 143 141 134   K 3.6 4.1 3.8 3.8   * 108* 108* 101   CO2 25 27 24 25   BUN 13 10 9 6*   CREATININE 0.8 0.8 0.7 0.7   CALCIUM 8.1* 8.1* 8.0* 8.1*   PHOS 2.2*  --  1.8* 2.2*     Recent Labs     12/28/19 0432 12/29/19  0431 12/30/19  0530   AST 20 21 25   ALT 17 15 16   BILITOT 0.4 0.4 0.4   ALKPHOS 62 103 69     No results for input(s): INR in the last 72 hours. No results for input(s): Eulene Sarks in the last 72 hours. Recent Labs     12/28/19 0432 12/29/19  0431 12/30/19  0530   AST 20 21 25   ALT 17 15 16   BILITOT 0.4 0.4 0.4   ALKPHOS 62 103 69     No results for input(s): LACTA in the last 72 hours.   No results found for: Antoinette Sheridan  No results found for: AMMONIA    Assessment:    Active Hospital Problems    Diagnosis Date Noted    Hypernatremia [E87.0] 12/25/2019    Dehydration [E86.0] 12/25/2019    Moderate protein-calorie malnutrition ([address] [E44.0] 06/15/2018   dysphagia  Hypernatremia resolved  Dementia  PAF      Plan:  *repeat video swallow in am  Cont aerosols   cont heparin drip  Cont zyvox      DVT Prophylaxis: heparin  Diet: Diet NPO Effective Now  Code Status: Full Code    PT/OT Eval Status: *done    Dispo - *GABRIELLA      Electronically signed by Shlomo Chatterjee DO on 12/30/2019 at 2:07 PM [facility]

## 2019-12-30 NOTE — PROGRESS NOTES
Pt seen with NP for Coordination of care discussion review. 81 yo  male, history of pulmonary embolism and dementia, who was not eating well at home was not drinking and became dehydrated. He also had diarrhea and fecal impaction. He states he resides with his  wife and that he does have hhc daily. He reports using a walker and being able to weak a few steps at home. Pt states his nephew Ray Mane is his HCPOA, copy is on file dated 8/6/19. Code status discussed, pt states to review this with nephew. Message left for Eze Ramírez, await return call.

## 2019-12-30 NOTE — PROGRESS NOTES
Pt seen for dysphagia. Results and recommendations of swallow study reviewed with patient. Laryngeal elevation and tongue base retraction exercises completed fair. Pt was instructed to continue exercises independently throughout the day.   Will continue

## 2019-12-30 NOTE — PROGRESS NOTES
12/30/2019    GLUCOSE 84 04/25/2012    PROT 5.5 12/30/2019    LABALBU 2.5 12/30/2019    LABALBU 4.1 04/25/2012    CALCIUM 8.1 12/30/2019    BILITOT 0.4 12/30/2019    ALKPHOS 69 12/30/2019    AST 25 12/30/2019    ALT 16 12/30/2019     Magnesium:    Lab Results   Component Value Date    MG 1.8 12/30/2019     Phosphorus:    Lab Results   Component Value Date    PHOS 2.2 12/30/2019     Vitamin D 25: 31  Folate: 13.6  10 B12: 7714      Radiology Review:      CT abdomen and pelvis without contrast December 25, 2019   1. Generalized anasarca which makes difficult to evaluate the omental   mesenteric fat planes in the abdomen may in the perirectal spaces are.       2. Findings for fecal rectal retention/impaction.       4. No obstructive uropathy.       5. No dilatation of the biliary tree or biliary ductal system       5. Confluent infiltrate with some consolidations of patch distribution   in the right lower lobe. Pneumonia or aspiration considered. Chest x-ray December 25, 2019   No acute cardiac pulmonary process. BRIEF SUMMARY OF INITIAL CONSULT:    Briefly Mr. 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 His medications prior to admission included Lasix 20 mg p.o. twice daily. Problems resolved:    · DINA stage I, volume responsive prerenal DINA, resolved  · Hypokalemia, multifactorial due to diuretics, poor oral intake. IMPRESSION/RECOMMENDATIONS:      1. Hypernatremia, multifactorial, secondary to diarrhea and decreased oral intake along with diuretics. Resolved. 2. Hypophosphatemia, due to poor oral intake   3. GPC bacteremia 2/4, on linezolid  4.  Fecal impaction per CT, on lactulose  5. History of DVT and PE, status post IVC filter placement, on apixaban  6. Normocytic anemia  7.  Nutrition, n.p.o.    Plan:    · Change IV fluids to D5N + KCL 20 meq L at 75cc/hour while npo   · Replace phosphorus

## 2019-12-30 NOTE — CONSULTS
Palliative Care Department  Palliative Care Initial Consult  Provider: Luanne LUEVANO-CNP, [facility] Day: 6    Referring Provider:  Lucas Higgins DO    Reason for Consult:  [x]  Code status Discussion  [x]  Assist with goals of care  []  Psychosocial support  []  Symptom Management  []  Advanced Care Planning    Chief Complaint: Millie Beck is a 80 y.o. male with chief complaint of intermittent abdominal pain       Active Hospital Problems    Diagnosis Date Noted    Hypernatremia [E87.0] 12/25/2019    Dehydration [E86.0] 12/25/2019    Moderate protein-calorie malnutrition ([facility] [address] [E44.0] 06/15/2018           Palliative Care Encounter/Recommendations:      - Goals of care: continue current management and to be determined     - Code Status: full code     - Symptom Management:      Subjective:     HPI:  Millie Beck is a 80 y.o. male with significant past medical history of alzheimer, DVT, Pulmonary embolism, macular degeneration, metabolic syndrome, IVC filter, pulmonary emboli who presented on 12/25 with shortness of breath, diarrhea and fecal impaction. Patient had not eaten for 3 days. NPO, IVF, replacements at this time, labs followed carefully. Subjective/Events/Discussions:  Patient is mostly bedridden. Either in a wheelchair or the bed at this time. Patient has a cane. Multiple episodes of stool. Had not eaten, drank very little for 3 days. Intermittent bouts of abdominal pain. Stated it was \"gas\". Has help at home. Lives with wife, and his son. Patient states that he tries to do the best he can. Asked Shaila Aragon about code status and he stated that Geeta La can decide for him. Call placed to Geeta La and VALERIE left with Palliative Care contact information. To remain a FULL code for now. Goal- rehab before home d/t weight loss and weakness.     - Family Meeting:   Participants:VALERIE left for Geeta La with contact information.   and patient   Family meeting was held to discuss:Code status, goals of care, prior expressed wishes, advanced care planning and discharge plan    -Surrogate/Legal NOK: Son and cousins     Contacts: Oli Odell [phone] Health agent                                             Rey Renee- (cousin) --[phone] [address] [facility], [address]ha [address]      Past Medical History:   Diagnosis Date    Alzheimer disease [facility])     DVT (deep venous thrombosis) ([address]     History of pulmonary embolism     WITH RIGHT VENTRICULAR INFARCTION    Macular degeneration     Metabolic syndrome     Presence of IVC filter     Pulmonary emboli [facility])        Past Surgical History:   Procedure Laterality Date    COLONOSCOPY  07/15/2003    HERNIA REPAIR      OTHER SURGICAL HISTORY  02/19/2019    Dr. Kimber Raza- IVC Filter    POLYPECTOMY      X 2       History reviewed. No pertinent family history. Social history:   status: no  Marital status:   Living status: with family:  spouse and son   Work history: unknown     No Known Allergies    ROS: UNLESS STATED PATIENT DENIES:  CONSTITUTIONAL:  fever, chill, rigors, nausea, vomiting, fatigue. HEENT: blurry vision, double vision, hearing problem, tinnitus, hoarseness, dysphagia, odynophagia  RESPIRATORY: cough, shortness of breath, sputum expectoration. CARDIOVASCULAR:  Chest pain/pressure, palpitation, syncope, irregular beats  GASTROINTESTINAL:  abdominal or rectal pain, diarrhea, constipation, . GENITOURINARY:  Burning, frequency, urgency, incontinence, discharge  INTEGUMENTARY: rash, wound, pruritis  HEMATOLOGIC/LYMPHATIC:  Swelling, sores, gum bleeding, easy bruising, pica. MUSCULOSKELETAL:  pain, edema, joint swelling or redness  NEUROLOGICAL:  light headed, dizziness, loss of consciousness, weakness, change in memory, seizures, tremors    Objective:     CT abdomen and pelvis without contrast December 25, 2019   1.  Generalized anasarca which makes difficult to evaluate the omental   mesenteric fat planes

## 2019-12-31 NOTE — PROGRESS NOTES
SPEECH/LANGUAGE PATHOLOGY  VIDEOFLUOROSCOPIC STUDY OF SWALLOWING (MBS)      PATIENT NAME:  Claudine Gutierrez      :  1932      TODAY'S DATE:  2019    SUMMARY OF EVALUATION     DYSPHAGIA DIAGNOSIS:  Severe pharyngeal dysphagia      DIET RECOMMENDATIONS:  NPO (nothing by mouth including oral meds)       FEEDING RECOMMENDATIONS:     Assistance level:  Not applicable      Compensatory strategies recommended: Not applicable    THERAPY RECOMMENDATIONS:      Dysphagia therapy is recommended 3-5 times per week with emphasis on the following, To improve tongue base retraction and To improve laryngeal elevation and closure                PROCEDURE     Consistencies Administered During the Evaluation   Liquids: nectar thick liquid   Solids:  pureed foods      Method of Intake:   spoon  Fed by clinician      Position:   Seated, upright, Lateral plane    Current Respiratory Status   nasal canula                RESULTS     ORAL STAGE       The oral stage of swallowing was within functional limits        PHARYNGEAL STAGE           ONSET TIME     Onset time of the pharyngeal swallow was adequate       PHARYNGEAL RESIDUALS          Vallecula/Pharyngeal Wall           Reduced tongue base retraction resulting in residuals in vallecula and/or posterior pharyngeal wall for nectar consistency liquid and pureed foods which partially cleared  with spontaneous double swallow      Pyriform Sinuses      Residuals in the pyriform sinuses were noted due to pharyngeal weakness for nectar consistency liquid and pureed foods which partially cleared  with spontaneous double swallow    LARYNGEAL PENETRATION     Laryngeal penetration occurred prior to aspiration. Further details under aspiration section. ASPIRATION    Aspiration occurred DURING the swallow for nectar consistency liquid due to  inadequate laryngeal closure . Aspiration was moderate and occurred consistently . an absent cough/throat clear was noted,

## 2019-12-31 NOTE — PROGRESS NOTES
Physical Therapy  Treatment Note    Name: Kalani Summers  : 1932  MRN: [id number]    Date of Service: 2019    Evaluating PT: Lio Dye, PT, DPT [id number]    Room #:  0565/5142-P    Diagnosis: hypernatremia  Precautions: fall risk, confusion, contact isolation (C-diff), puree diet, nectar thick liquids, visual deficits  Procedures: none  PMHx: PE, IVC filter placed, macular degeneration, alzheimer's   Recommended DME: to be determined    Pt lives with spouse and son in a 2 story house with ramp to enter. Bed is on the first floor and bath is on the first floor. Pt son provides assistance using mechanical standing lift to complete all transfers. Pt sleeps in hospital bed at baseline. He does not amb at baseline, uses w/c for primary mobility. Pt owns walker, hospital bed, BSC, mechanical standing lift, w/c. HPI: Pt presented to the ED on 19 with increased difficulty eating/drinking and multiple episodes of diarrhea. Initial Evaluation  Date:  Treatment Date:   Short Term/ Long Term   Goals   AM-PAC 6 Clicks     Was pt agreeable to Eval/treatment? yes Yes    Does pt have pain? No outward signs of pain observed Pt c/o pain with all movements    Bed Mobility  Rolling: Max A   Supine to sit: Max A x2  Sit to supine: Max A x2  Scooting: Max A x2 Rolling: Dep  Supine to sit: Dep x2  Sit to supine: Dep x2  Scooting: Dep x2  Rolling: Mod A  Supine to sit: Max A   Sit to supine: Max A   Scooting:  Max A    Transfers Sit to stand: NT  Stand to sit: NT  Stand pivot: NT Dep to clear Buttocks from elevated bed for linen change NA- pt uses standing lift for all sit<>stand and surface to surface transfers   Ambulation   NT, non-ambulatory at baseline NA NA   Stair negotiation: NT, non-ambulatory at baseline  NA NA   ROM B UE: please see OT note  B LE: lacking approximately 10 degrees knee extension in sitting, appears to have hip flexion contracture to approx 90 degrees Pt resistive and non-participatory     Strength B UE: please see OT note  B LE: unable to formally assess due to cognition Pt resistive and non-participatory     Balance Sitting EOB: Max A progressing to Min A  Dynamic standing: NT Sitting EOB: Min A<>SBA Sitting EOB: Min A  Dynamic standing: NT     Patient education  Pt was educated on benefits of ROM, increasing activity, balance retraining and position changes. Patient response to education:   Pt verbalized understanding Pt demonstrated skill Pt requires further education in this area   yes Requires assist Yes     Additional Comments: pt was received in R side lying and was agreeable to PT treatment session. Pt was resistive to all movements due to c/o pain. Flexor contractures noted to B hips and knees. Significant edema in B feet and ankles; unable to attain neutral.  Pt was transferred to sitting EOB and positioned at EOB. Balance retraining performed at EOB >25-30 minutes. Attempted AAROM in BLE with poor result. STS attempted from elevated bed for linen change with pt barely able to clear B buttocks with dependent assist.  Pt very rigid in trunk, hips and knees. Pt was returned to bed and refused repositioning into supine or L side lying. Pillows and prevalon boots placed to preserve joint and skin integrity. Pt was left with call light left by patient and all needs met. Chair/bed alarm: reset following session    Time in: 1040  Time out: 1120    Pt is making poor progress toward established Physical Therapy goals. Continue with physical therapy current plan of care.     Socorro Beckford PT, DPT  License [id number]

## 2019-12-31 NOTE — PROGRESS NOTES
Nutrition Assessment    Type and Reason for Visit: Reassess    Nutrition Recommendations: Advance nutrition as medically appropriate  Pt continues w/ SLP rec for NPO, await nutritional POC    Pt now at high risk for refeeding syndrome, if plan for EN, recommend initial goal of 1/2 rate w/ slow progression to end goal and close monitoring of electrolytes    Recommend initial goal of 1.5 Calorie w/ Fiber @ 30 ml/hr to provide 720 ml tv, 1080 kcal, 46 gm pro, 547 ml free water    End goal of 1.5 Calorie w/ Fiber @ 55 ml/hr + 1 protein modular daily to provide 1320 ml tv, 1980 kcal, 84 gm pro, 1003 ml free water (2084 kcal, 110 gm pro w/ daily pro mod)    Nutrition Assessment: Pt's nutritional status remains unchanged, now s/p failed MBS x2 w/ SLP rec for NPO. Pt at high risk for refeeding syndrome 2/2 minimal PO intake PTA now w/ NPO status x6 days. Pt w/ multiple open areas. Will provide updated recs and monitor    Malnutrition Assessment:  · Malnutrition Status: Meets the criteria for moderate malnutrition  · Context: Chronic illness  · Findings of the 6 clinical characteristics of malnutrition (Minimum of 2 out of 6 clinical characteristics is required to make the diagnosis of moderate or severe Protein Calorie Malnutrition based on AND/ASPEN Guidelines):  1. Energy Intake-Less than or equal to 50% of estimated energy requirement, Greater than or equal to 5 days    2. Weight Loss-10% loss or greater, (in 4 months)  3. Fat Loss-Moderate subcutaneous fat loss, Orbital, Triceps  4. Muscle Loss-Moderate muscle mass loss, Temples (temporalis muscle), Clavicles (pectoralis and deltoids), Thigh (quadriceps), Calf (gastrocnemius)  5. Fluid Accumulation-Severe fluid accumulation, Genitals, Extremities  6.   Strength-Not measured    Nutrition Risk Level: High    Nutrient Needs:  · Estimated Daily Total Kcal: (MSJ 1562 x 1.3 SF)  · Estimated Daily Protein (g): 105-115(1.3-1.5 gm/kg CBW)  · Estimated Daily Total

## 2019-12-31 NOTE — PROGRESS NOTES
Hospitalist Progress Note      PCP: Warren Cadena DO    Date of Admission: 12/25/2019    Chief Complaint: * hypernatremia     Hospital Course: This is a 42-year-old male with history of pulmonary embolism and dementia, who was not eating well at home was not drinking and became dehydrated.  He also had diarrhea and fecal impaction.  IVAB discontinued, blood cultures were probably contaminant*  Per ID. He failed the swallow eval, and speech recommended that he  Have a repeat swallow eval in 5 days, will order for 12/31. His sodium has now returned to normal.  Failed again, will need PEG   **        Subjective: *confused       Medications:  Reviewed    Infusion Medications    dextrose 5% and 0.9% NaCl with KCl 20 mEq 75 mL/hr at 12/31/19 1438    heparin (porcine) 9 Units/kg/hr (12/31/19 0200)     Scheduled Medications    pantoprazole  40 mg Intravenous Daily    And    sodium chloride (PF)  10 mL Intravenous Daily    aspirin  300 mg Rectal Daily    ipratropium-albuterol  1 ampule Inhalation Q4H WA     PRN Meds: sodium chloride flush, acetaminophen, HYDROmorphone, heparin (porcine), heparin (porcine)      Intake/Output Summary (Last 24 hours) at 12/31/2019 1654  Last data filed at 12/31/2019 1441  Gross per 24 hour   Intake 2376 ml   Output 1200 ml   Net 1176 ml       Exam:    BP (!) 143/75   Pulse 83   Temp 97.8 °F (36.6 °C) (Temporal)   Resp 16   Ht 6' 3\" (1.905 m)   Wt 190 lb 8 oz (86.4 kg)   SpO2 100%   BMI 23.81 kg/m²       Gen:  Well developed  HEENT: NC/AT, moist mucous membranes,   Neck: supple, trachea midline, no anterior cervical or SC LAD  Heart:  Normal s1/s2, RRR, no murmurs, gallops, or rubs.    Lungs:  cta * bilaterally,   Abd: bowel sounds present, soft, nontender, nondistended, no masses  Extrem:  No clubbing, cyanosis,  pos** edema  Skin: no rashes or lesions  Psych: Not oriented to date, Not oriented to person or Not oriented to place    Capillary Refill: Brisk,< 3 seconds Peripheral Pulses: +2 palpable, equal bilaterally                   Labs:   Recent Labs     12/29/19  0431 12/30/19  0530 12/31/19  0510   WBC 6.0 5.9 4.8   HGB 8.1* 8.1* 7.7*   HCT 26.1* 25.7* 24.4*    247 252     Recent Labs     12/29/19  0431 12/30/19  0530 12/31/19  0510    134 133   K 3.8 3.8 3.7   * 101 102   CO2 24 25 22   BUN 9 6* 5*   CREATININE 0.7 0.7 0.8   CALCIUM 8.0* 8.1* 8.1*   PHOS 1.8* 2.2* 2.4*     Recent Labs     12/29/19 0431 12/30/19  0530 12/31/19  0510   AST 21 25 20   ALT 15 16 14   BILITOT 0.4 0.4 0.3   ALKPHOS 103 69 63     No results for input(s): INR in the last 72 hours. No results for input(s): Jhony Prayer in the last 72 hours. Recent Labs     12/29/19  0431 12/30/19  0530 12/31/19  0510   AST 21 25 20   ALT 15 16 14   BILITOT 0.4 0.4 0.3   ALKPHOS 103 69 63     No results for input(s): LACTA in the last 72 hours.   No results found for: Rejeana Lung  No results found for: AMMONIA    Assessment:    Active Hospital Problems    Diagnosis Date Noted    Hypernatremia [E87.0] 12/25/2019    Dehydration [E86.0] 12/25/2019    Moderate protein-calorie malnutrition ([address] [E44.0] 06/15/2018   dysphagia  Hypernatremia resolved  Dementia  PAF        Plan:  PEG  Cont aerosols   cont heparin drip  Cont zyvox        DVT Prophylaxis: heparin  Diet: Diet NPO Effective Now  Code Status: Full Code     PT/OT Eval Status: *done     Dispo - *GABRIELLA      Electronically signed by Yari Hennessy DO on 12/31/2019 at 4:54 PM [facility]

## 2019-12-31 NOTE — PROGRESS NOTES
Pt seen with NP, call again placed to his nephew/ hcpgeorge Jon. Await return call to discuss code status.

## 2019-12-31 NOTE — PLAN OF CARE
Problem: Malnutrition  (NI-5.2)  Goal: Food and/or Nutrient Delivery  Description: Nutrition progression  Individualized approach for food/nutrient provision.   Outcome: Met This Shift

## 2019-12-31 NOTE — PLAN OF CARE
Problem: Falls - Risk of:  Goal: Will remain free from falls  Description  Will remain free from falls  Outcome: Met This Shift     Problem: Risk for Impaired Skin Integrity  Goal: Tissue integrity - skin and mucous membranes  Description  Structural intactness and normal physiological function of skin and  mucous membranes.   Outcome: Met This Shift     Problem: Pain:  Goal: Pain level will decrease  Description  Pain level will decrease  Outcome: Met This Shift     Problem: Skin Integrity:  Goal: Will show no infection signs and symptoms  Description  Will show no infection signs and symptoms  Outcome: Met This Shift

## 2020-01-01 ENCOUNTER — HOSPITAL ENCOUNTER (EMERGENCY)
Age: 85
Discharge: ANOTHER HOSPITAL | End: 2020-01-13
Attending: EMERGENCY MEDICINE
Payer: MEDICARE

## 2020-01-01 ENCOUNTER — ANESTHESIA EVENT (OUTPATIENT)
Dept: OPERATING ROOM | Age: 85
DRG: 329 | End: 2020-01-01
Payer: MEDICARE

## 2020-01-01 ENCOUNTER — ANESTHESIA (OUTPATIENT)
Dept: ENDOSCOPY | Age: 85
DRG: 177 | End: 2020-01-01
Payer: MEDICARE

## 2020-01-01 ENCOUNTER — ANESTHESIA (OUTPATIENT)
Dept: OPERATING ROOM | Age: 85
DRG: 329 | End: 2020-01-01
Payer: MEDICARE

## 2020-01-01 ENCOUNTER — ANESTHESIA EVENT (OUTPATIENT)
Dept: ENDOSCOPY | Age: 85
DRG: 177 | End: 2020-01-01
Payer: MEDICARE

## 2020-01-01 ENCOUNTER — TELEPHONE (OUTPATIENT)
Dept: OTHER | Facility: CLINIC | Age: 85
End: 2020-01-01

## 2020-01-01 ENCOUNTER — APPOINTMENT (OUTPATIENT)
Dept: CT IMAGING | Age: 85
End: 2020-01-01
Payer: MEDICARE

## 2020-01-01 ENCOUNTER — HOSPITAL ENCOUNTER (INPATIENT)
Age: 85
LOS: 8 days | Discharge: NURSING, REHAB OR LONG-TERM CARE | DRG: 329 | End: 2020-01-21
Attending: INTERNAL MEDICINE | Admitting: INTERNAL MEDICINE
Payer: MEDICARE

## 2020-01-01 ENCOUNTER — HOSPITAL ENCOUNTER (OUTPATIENT)
Age: 85
Discharge: HOME | End: 2020-01-13
Payer: MEDICARE

## 2020-01-01 VITALS
HEIGHT: 75 IN | TEMPERATURE: 97.3 F | DIASTOLIC BLOOD PRESSURE: 76 MMHG | OXYGEN SATURATION: 98 % | WEIGHT: 217.4 LBS | SYSTOLIC BLOOD PRESSURE: 126 MMHG | BODY MASS INDEX: 27.03 KG/M2 | HEART RATE: 97 BPM | RESPIRATION RATE: 14 BRPM

## 2020-01-01 VITALS
BODY MASS INDEX: 23.46 KG/M2 | DIASTOLIC BLOOD PRESSURE: 59 MMHG | SYSTOLIC BLOOD PRESSURE: 128 MMHG | WEIGHT: 188.7 LBS | TEMPERATURE: 98 F | OXYGEN SATURATION: 100 % | HEIGHT: 75 IN | OXYGEN SATURATION: 98 % | RESPIRATION RATE: 18 BRPM | DIASTOLIC BLOOD PRESSURE: 59 MMHG | SYSTOLIC BLOOD PRESSURE: 94 MMHG | HEART RATE: 100 BPM

## 2020-01-01 VITALS — TEMPERATURE: 59 F | DIASTOLIC BLOOD PRESSURE: 46 MMHG | OXYGEN SATURATION: 100 % | SYSTOLIC BLOOD PRESSURE: 110 MMHG

## 2020-01-01 VITALS
HEIGHT: 75 IN | BODY MASS INDEX: 23.38 KG/M2 | TEMPERATURE: 98.7 F | DIASTOLIC BLOOD PRESSURE: 48 MMHG | OXYGEN SATURATION: 100 % | RESPIRATION RATE: 18 BRPM | WEIGHT: 188 LBS | SYSTOLIC BLOOD PRESSURE: 109 MMHG | HEART RATE: 78 BPM

## 2020-01-01 VITALS — DIASTOLIC BLOOD PRESSURE: 52 MMHG | SYSTOLIC BLOOD PRESSURE: 102 MMHG | OXYGEN SATURATION: 100 %

## 2020-01-01 LAB
ABO/RH: NORMAL
ACANTHOCYTES: ABNORMAL
ALBUMIN SERPL-MCNC: 1.8 G/DL (ref 3.5–5.2)
ALBUMIN SERPL-MCNC: 2.1 G/DL (ref 3.5–5.2)
ALBUMIN SERPL-MCNC: 2.3 G/DL (ref 3.5–5.2)
ALBUMIN SERPL-MCNC: 2.4 G/DL (ref 3.5–5.2)
ALBUMIN SERPL-MCNC: 2.5 G/DL (ref 3.5–5.2)
ALBUMIN SERPL-MCNC: 2.5 G/DL (ref 3.5–5.2)
ALBUMIN SERPL-MCNC: 2.6 G/DL (ref 3.5–5.2)
ALBUMIN SERPL-MCNC: 3.4 G/DL (ref 3.5–5.2)
ALP BLD-CCNC: 46 U/L (ref 40–129)
ALP BLD-CCNC: 52 U/L (ref 40–129)
ALP BLD-CCNC: 54 U/L (ref 40–129)
ALP BLD-CCNC: 56 U/L (ref 40–129)
ALP BLD-CCNC: 59 U/L (ref 40–129)
ALP BLD-CCNC: 59 U/L (ref 40–129)
ALP BLD-CCNC: 60 U/L (ref 40–129)
ALP BLD-CCNC: 62 U/L (ref 40–129)
ALP BLD-CCNC: 63 U/L (ref 40–129)
ALP BLD-CCNC: 69 U/L (ref 40–129)
ALP BLD-CCNC: 71 U/L (ref 40–129)
ALP BLD-CCNC: 80 U/L (ref 40–129)
ALT SERPL-CCNC: 13 U/L (ref 0–40)
ALT SERPL-CCNC: 14 U/L (ref 0–40)
ALT SERPL-CCNC: 15 U/L (ref 0–40)
ALT SERPL-CCNC: 17 U/L (ref 0–40)
ALT SERPL-CCNC: 20 U/L (ref 0–40)
ALT SERPL-CCNC: 25 U/L (ref 0–40)
ALT SERPL-CCNC: 32 U/L (ref 0–40)
ALT SERPL-CCNC: 39 U/L (ref 0–40)
ALT SERPL-CCNC: 47 U/L (ref 0–40)
ALT SERPL-CCNC: 8 U/L (ref 0–40)
ANGLE (CLOT STRENGTH): 78.5 DEGREE (ref 59–74)
ANGLE (CLOT STRENGTH): 79.4 DEGREE (ref 59–74)
ANION GAP SERPL CALCULATED.3IONS-SCNC: 10 MMOL/L (ref 7–16)
ANION GAP SERPL CALCULATED.3IONS-SCNC: 11 MMOL/L (ref 7–16)
ANION GAP SERPL CALCULATED.3IONS-SCNC: 12 MMOL/L (ref 7–16)
ANION GAP SERPL CALCULATED.3IONS-SCNC: 12 MMOL/L (ref 7–16)
ANION GAP SERPL CALCULATED.3IONS-SCNC: 13 MMOL/L (ref 7–16)
ANION GAP SERPL CALCULATED.3IONS-SCNC: 14 MMOL/L (ref 7–16)
ANION GAP SERPL CALCULATED.3IONS-SCNC: 16 MMOL/L (ref 7–16)
ANION GAP SERPL CALCULATED.3IONS-SCNC: 9 MMOL/L (ref 7–16)
ANISOCYTOSIS: ABNORMAL
ANTIBODY SCREEN: NORMAL
APTT: 115.8 SEC (ref 24.5–35.1)
APTT: 36.9 SEC (ref 24.5–35.1)
APTT: 72.9 SEC (ref 24.5–35.1)
AST SERPL-CCNC: 14 U/L (ref 0–39)
AST SERPL-CCNC: 21 U/L (ref 0–39)
AST SERPL-CCNC: 21 U/L (ref 0–39)
AST SERPL-CCNC: 23 U/L (ref 0–39)
AST SERPL-CCNC: 24 U/L (ref 0–39)
AST SERPL-CCNC: 24 U/L (ref 0–39)
AST SERPL-CCNC: 25 U/L (ref 0–39)
AST SERPL-CCNC: 33 U/L (ref 0–39)
AST SERPL-CCNC: 34 U/L (ref 0–39)
AST SERPL-CCNC: 47 U/L (ref 0–39)
AST SERPL-CCNC: 66 U/L (ref 0–39)
AST SERPL-CCNC: 93 U/L (ref 0–39)
B.E.: -1.8 MMOL/L (ref -3–3)
BASOPHILS ABSOLUTE: 0 E9/L (ref 0–0.2)
BASOPHILS ABSOLUTE: 0 E9/L (ref 0–0.2)
BASOPHILS ABSOLUTE: 0.02 E9/L (ref 0–0.2)
BASOPHILS ABSOLUTE: 0.03 E9/L (ref 0–0.2)
BASOPHILS ABSOLUTE: 0.03 E9/L (ref 0–0.2)
BASOPHILS ABSOLUTE: 0.04 E9/L (ref 0–0.2)
BASOPHILS ABSOLUTE: 0.04 E9/L (ref 0–0.2)
BASOPHILS RELATIVE PERCENT: 0.1 % (ref 0–2)
BASOPHILS RELATIVE PERCENT: 0.2 % (ref 0–2)
BASOPHILS RELATIVE PERCENT: 0.3 % (ref 0–2)
BASOPHILS RELATIVE PERCENT: 0.4 % (ref 0–2)
BILIRUB SERPL-MCNC: 0.3 MG/DL (ref 0–1.2)
BILIRUB SERPL-MCNC: 0.4 MG/DL (ref 0–1.2)
BILIRUB SERPL-MCNC: 0.5 MG/DL (ref 0–1.2)
BILIRUB SERPL-MCNC: 0.8 MG/DL (ref 0–1.2)
BLOOD BANK DISPENSE STATUS: NORMAL
BLOOD BANK PRODUCT CODE: NORMAL
BLOOD CULTURE, ROUTINE: ABNORMAL
BPU ID: NORMAL
BUN BLDV-MCNC: 10 MG/DL (ref 8–23)
BUN BLDV-MCNC: 11 MG/DL (ref 8–23)
BUN BLDV-MCNC: 11 MG/DL (ref 8–23)
BUN BLDV-MCNC: 12 MG/DL (ref 8–23)
BUN BLDV-MCNC: 12 MG/DL (ref 8–23)
BUN BLDV-MCNC: 17 MG/DL (ref 8–23)
BUN BLDV-MCNC: 22 MG/DL (ref 8–23)
BUN BLDV-MCNC: 23 MG/DL (ref 8–23)
BUN BLDV-MCNC: 28 MG/DL (ref 8–23)
BUN BLDV-MCNC: 3 MG/DL (ref 8–23)
BUN BLDV-MCNC: 3 MG/DL (ref 8–23)
BUN BLDV-MCNC: 4 MG/DL (ref 8–23)
BUN BLDV-MCNC: 4 MG/DL (ref 8–23)
BURR CELLS: ABNORMAL
CALCIUM IONIZED: 1.17 MMOL/L (ref 1.15–1.33)
CALCIUM IONIZED: 1.18 MMOL/L (ref 1.15–1.33)
CALCIUM IONIZED: 1.21 MMOL/L (ref 1.15–1.33)
CALCIUM IONIZED: 1.22 MMOL/L (ref 1.15–1.33)
CALCIUM IONIZED: 1.22 MMOL/L (ref 1.15–1.33)
CALCIUM IONIZED: 1.24 MMOL/L (ref 1.15–1.33)
CALCIUM SERPL-MCNC: 5.6 MG/DL (ref 8.6–10.2)
CALCIUM SERPL-MCNC: 7.4 MG/DL (ref 8.6–10.2)
CALCIUM SERPL-MCNC: 7.7 MG/DL (ref 8.6–10.2)
CALCIUM SERPL-MCNC: 7.8 MG/DL (ref 8.6–10.2)
CALCIUM SERPL-MCNC: 7.8 MG/DL (ref 8.6–10.2)
CALCIUM SERPL-MCNC: 7.9 MG/DL (ref 8.6–10.2)
CALCIUM SERPL-MCNC: 7.9 MG/DL (ref 8.6–10.2)
CALCIUM SERPL-MCNC: 8 MG/DL (ref 8.6–10.2)
CALCIUM SERPL-MCNC: 8.1 MG/DL (ref 8.6–10.2)
CALCIUM SERPL-MCNC: 8.2 MG/DL (ref 8.6–10.2)
CALCIUM SERPL-MCNC: 8.3 MG/DL (ref 8.6–10.2)
CALCIUM SERPL-MCNC: 8.7 MG/DL (ref 8.6–10.2)
CALCIUM SERPL-MCNC: 8.7 MG/DL (ref 8.6–10.2)
CHLORIDE BLD-SCNC: 102 MMOL/L (ref 98–107)
CHLORIDE BLD-SCNC: 103 MMOL/L (ref 98–107)
CHLORIDE BLD-SCNC: 104 MMOL/L (ref 98–107)
CHLORIDE BLD-SCNC: 104 MMOL/L (ref 98–107)
CHLORIDE BLD-SCNC: 105 MMOL/L (ref 98–107)
CHLORIDE BLD-SCNC: 105 MMOL/L (ref 98–107)
CHLORIDE BLD-SCNC: 107 MMOL/L (ref 98–107)
CHLORIDE BLD-SCNC: 107 MMOL/L (ref 98–107)
CHLORIDE BLD-SCNC: 108 MMOL/L (ref 98–107)
CHLORIDE BLD-SCNC: 109 MMOL/L (ref 98–107)
CHLORIDE BLD-SCNC: 109 MMOL/L (ref 98–107)
CHLORIDE BLD-SCNC: 115 MMOL/L (ref 98–107)
CHLORIDE BLD-SCNC: 98 MMOL/L (ref 98–107)
CO2: 16 MMOL/L (ref 22–29)
CO2: 17 MMOL/L (ref 22–29)
CO2: 19 MMOL/L (ref 22–29)
CO2: 20 MMOL/L (ref 22–29)
CO2: 22 MMOL/L (ref 22–29)
CO2: 22 MMOL/L (ref 22–29)
CO2: 23 MMOL/L (ref 22–29)
CO2: 24 MMOL/L (ref 22–29)
CO2: 28 MMOL/L (ref 22–29)
COHB: 0.1 % (ref 0–1.5)
CREAT SERPL-MCNC: 0.5 MG/DL (ref 0.7–1.2)
CREAT SERPL-MCNC: 0.7 MG/DL (ref 0.7–1.2)
CREAT SERPL-MCNC: 0.8 MG/DL (ref 0.7–1.2)
CREAT SERPL-MCNC: 0.9 MG/DL (ref 0.7–1.2)
CRITICAL: ABNORMAL
CULTURE, BLOOD 2: NORMAL
DATE ANALYZED: ABNORMAL
DATE OF COLLECTION: ABNORMAL
DESCRIPTION BLOOD BANK: NORMAL
EKG ATRIAL RATE: 72 BPM
EKG Q-T INTERVAL: 404 MS
EKG QRS DURATION: 102 MS
EKG QTC CALCULATION (BAZETT): 442 MS
EKG R AXIS: -29 DEGREES
EKG T AXIS: 4 DEGREES
EKG VENTRICULAR RATE: 72 BPM
EOSINOPHILS ABSOLUTE: 0 E9/L (ref 0.05–0.5)
EOSINOPHILS ABSOLUTE: 0 E9/L (ref 0.05–0.5)
EOSINOPHILS ABSOLUTE: 0.01 E9/L (ref 0.05–0.5)
EOSINOPHILS ABSOLUTE: 0.08 E9/L (ref 0.05–0.5)
EOSINOPHILS ABSOLUTE: 0.09 E9/L (ref 0.05–0.5)
EOSINOPHILS ABSOLUTE: 0.09 E9/L (ref 0.05–0.5)
EOSINOPHILS ABSOLUTE: 0.11 E9/L (ref 0.05–0.5)
EOSINOPHILS RELATIVE PERCENT: 0.1 % (ref 0–6)
EOSINOPHILS RELATIVE PERCENT: 0.1 % (ref 0–6)
EOSINOPHILS RELATIVE PERCENT: 0.2 % (ref 0–6)
EOSINOPHILS RELATIVE PERCENT: 0.8 % (ref 0–6)
EOSINOPHILS RELATIVE PERCENT: 0.9 % (ref 0–6)
EOSINOPHILS RELATIVE PERCENT: 1 % (ref 0–6)
EOSINOPHILS RELATIVE PERCENT: 1 % (ref 0–6)
EOSINOPHILS RELATIVE PERCENT: 1.1 % (ref 0–6)
EOSINOPHILS RELATIVE PERCENT: 1.5 % (ref 0–6)
EPL-TEG: 0.5 % (ref 0–15)
EPL-TEG: 2.2 % (ref 0–15)
G-TEG: 13.5 K D/SC (ref 4.5–11)
G-TEG: 15.9 K D/SC (ref 4.5–11)
GFR AFRICAN AMERICAN: >60
GFR NON-AFRICAN AMERICAN: >60 ML/MIN/1.73
GLUCOSE BLD-MCNC: 100 MG/DL (ref 74–99)
GLUCOSE BLD-MCNC: 101 MG/DL (ref 74–99)
GLUCOSE BLD-MCNC: 104 MG/DL (ref 74–99)
GLUCOSE BLD-MCNC: 107 MG/DL (ref 74–99)
GLUCOSE BLD-MCNC: 108 MG/DL (ref 74–99)
GLUCOSE BLD-MCNC: 116 MG/DL (ref 74–99)
GLUCOSE BLD-MCNC: 123 MG/DL (ref 74–99)
GLUCOSE BLD-MCNC: 124 MG/DL (ref 74–99)
GLUCOSE BLD-MCNC: 128 MG/DL (ref 74–99)
GLUCOSE BLD-MCNC: 67 MG/DL (ref 74–99)
GLUCOSE BLD-MCNC: 78 MG/DL (ref 74–99)
GLUCOSE BLD-MCNC: 95 MG/DL (ref 74–99)
GLUCOSE BLD-MCNC: 97 MG/DL (ref 74–99)
HCO3: 21.3 MMOL/L (ref 22–26)
HCT VFR BLD CALC: 16.4 % (ref 37–54)
HCT VFR BLD CALC: 21.2 % (ref 37–54)
HCT VFR BLD CALC: 21.2 % (ref 37–54)
HCT VFR BLD CALC: 22.7 % (ref 37–54)
HCT VFR BLD CALC: 22.9 % (ref 37–54)
HCT VFR BLD CALC: 23.1 % (ref 37–54)
HCT VFR BLD CALC: 23.4 % (ref 37–54)
HCT VFR BLD CALC: 24.2 % (ref 37–54)
HCT VFR BLD CALC: 24.3 % (ref 37–54)
HCT VFR BLD CALC: 24.4 % (ref 37–54)
HCT VFR BLD CALC: 24.4 % (ref 37–54)
HCT VFR BLD CALC: 24.7 % (ref 37–54)
HCT VFR BLD CALC: 24.7 % (ref 37–54)
HCT VFR BLD CALC: 24.8 % (ref 37–54)
HCT VFR BLD CALC: 24.8 % (ref 37–54)
HCT VFR BLD CALC: 24.9 % (ref 37–54)
HCT VFR BLD CALC: 25 % (ref 37–54)
HCT VFR BLD CALC: 25.1 % (ref 37–54)
HCT VFR BLD CALC: 25.8 % (ref 37–54)
HCT VFR BLD CALC: 25.8 % (ref 37–54)
HCT VFR BLD CALC: 26.1 % (ref 37–54)
HCT VFR BLD CALC: 26.5 % (ref 37–54)
HCT VFR BLD CALC: 26.6 % (ref 37–54)
HCT VFR BLD CALC: 26.9 % (ref 37–54)
HCT VFR BLD CALC: 28 % (ref 37–54)
HCT VFR BLD CALC: 28.2 % (ref 37–54)
HCT VFR BLD CALC: 28.5 % (ref 37–54)
HEMOGLOBIN: 5.1 G/DL (ref 12.5–16.5)
HEMOGLOBIN: 6.7 G/DL (ref 12.5–16.5)
HEMOGLOBIN: 6.9 G/DL (ref 12.5–16.5)
HEMOGLOBIN: 7.3 G/DL (ref 12.5–16.5)
HEMOGLOBIN: 7.5 G/DL (ref 12.5–16.5)
HEMOGLOBIN: 7.5 G/DL (ref 12.5–16.5)
HEMOGLOBIN: 7.7 G/DL (ref 12.5–16.5)
HEMOGLOBIN: 7.8 G/DL (ref 12.5–16.5)
HEMOGLOBIN: 7.8 G/DL (ref 12.5–16.5)
HEMOGLOBIN: 7.9 G/DL (ref 12.5–16.5)
HEMOGLOBIN: 8 G/DL (ref 12.5–16.5)
HEMOGLOBIN: 8.1 G/DL (ref 12.5–16.5)
HEMOGLOBIN: 8.1 G/DL (ref 12.5–16.5)
HEMOGLOBIN: 8.2 G/DL (ref 12.5–16.5)
HEMOGLOBIN: 8.2 G/DL (ref 12.5–16.5)
HEMOGLOBIN: 8.3 G/DL (ref 12.5–16.5)
HEMOGLOBIN: 8.4 G/DL (ref 12.5–16.5)
HEMOGLOBIN: 8.6 G/DL (ref 12.5–16.5)
HEMOGLOBIN: 8.6 G/DL (ref 12.5–16.5)
HEMOGLOBIN: 8.7 G/DL (ref 12.5–16.5)
HEMOGLOBIN: 8.9 G/DL (ref 12.5–16.5)
HEMOGLOBIN: 9 G/DL (ref 12.5–16.5)
HEMOGLOBIN: 9.1 G/DL (ref 12.5–16.5)
HHB: 4 % (ref 0–5)
HYPOCHROMIA: ABNORMAL
HYPOCHROMIA: ABNORMAL
IMMATURE GRANULOCYTES #: 0.06 E9/L
IMMATURE GRANULOCYTES #: 0.07 E9/L
IMMATURE GRANULOCYTES #: 0.08 E9/L
IMMATURE GRANULOCYTES #: 0.08 E9/L
IMMATURE GRANULOCYTES #: 0.1 E9/L
IMMATURE GRANULOCYTES #: 0.11 E9/L
IMMATURE GRANULOCYTES #: 0.12 E9/L
IMMATURE GRANULOCYTES %: 0.7 % (ref 0–5)
IMMATURE GRANULOCYTES %: 1 % (ref 0–5)
IMMATURE GRANULOCYTES %: 1 % (ref 0–5)
IMMATURE GRANULOCYTES %: 1.1 % (ref 0–5)
IMMATURE GRANULOCYTES %: 1.2 % (ref 0–5)
INR BLD: 1.5
INR BLD: 1.9
K (CLOTTING TIME): 0.8 MIN (ref 1–3)
K (CLOTTING TIME): 0.8 MIN (ref 1–3)
LAB: ABNORMAL
LACTIC ACID: 2.1 MMOL/L (ref 0.5–2.2)
LACTIC ACID: 2.2 MMOL/L (ref 0.5–2.2)
LIPASE: 31 U/L (ref 13–60)
LY30 (FIBRINOLYSIS): 0.5 % (ref 0–8)
LY30 (FIBRINOLYSIS): 2.2 % (ref 0–8)
LYMPHOCYTES ABSOLUTE: 0 E9/L (ref 1.5–4)
LYMPHOCYTES ABSOLUTE: 0.44 E9/L (ref 1.5–4)
LYMPHOCYTES ABSOLUTE: 0.64 E9/L (ref 1.5–4)
LYMPHOCYTES ABSOLUTE: 0.66 E9/L (ref 1.5–4)
LYMPHOCYTES ABSOLUTE: 0.67 E9/L (ref 1.5–4)
LYMPHOCYTES ABSOLUTE: 0.68 E9/L (ref 1.5–4)
LYMPHOCYTES ABSOLUTE: 0.84 E9/L (ref 1.5–4)
LYMPHOCYTES ABSOLUTE: 0.91 E9/L (ref 1.5–4)
LYMPHOCYTES ABSOLUTE: 1.07 E9/L (ref 1.5–4)
LYMPHOCYTES RELATIVE PERCENT: 11.1 % (ref 20–42)
LYMPHOCYTES RELATIVE PERCENT: 11.9 % (ref 20–42)
LYMPHOCYTES RELATIVE PERCENT: 12.5 % (ref 20–42)
LYMPHOCYTES RELATIVE PERCENT: 2.6 % (ref 20–42)
LYMPHOCYTES RELATIVE PERCENT: 5.9 % (ref 20–42)
LYMPHOCYTES RELATIVE PERCENT: 6 % (ref 20–42)
LYMPHOCYTES RELATIVE PERCENT: 7.2 % (ref 20–42)
LYMPHOCYTES RELATIVE PERCENT: 7.3 % (ref 20–42)
LYMPHOCYTES RELATIVE PERCENT: 8.2 % (ref 20–42)
Lab: ABNORMAL
MA (MAX AMPLITUDE): 72.9 MM (ref 50–70)
MA (MAX AMPLITUDE): 76.1 MM (ref 50–70)
MAGNESIUM: 1.2 MG/DL (ref 1.6–2.6)
MAGNESIUM: 1.8 MG/DL (ref 1.6–2.6)
MAGNESIUM: 1.9 MG/DL (ref 1.6–2.6)
MAGNESIUM: 1.9 MG/DL (ref 1.6–2.6)
MAGNESIUM: 2 MG/DL (ref 1.6–2.6)
MAGNESIUM: 2.1 MG/DL (ref 1.6–2.6)
MAGNESIUM: 2.1 MG/DL (ref 1.6–2.6)
MAGNESIUM: 2.2 MG/DL (ref 1.6–2.6)
MAGNESIUM: 2.2 MG/DL (ref 1.6–2.6)
MCH RBC QN AUTO: 27.6 PG (ref 26–35)
MCH RBC QN AUTO: 28.4 PG (ref 26–35)
MCH RBC QN AUTO: 28.4 PG (ref 26–35)
MCH RBC QN AUTO: 28.5 PG (ref 26–35)
MCH RBC QN AUTO: 28.5 PG (ref 26–35)
MCH RBC QN AUTO: 28.6 PG (ref 26–35)
MCH RBC QN AUTO: 28.7 PG (ref 26–35)
MCH RBC QN AUTO: 28.7 PG (ref 26–35)
MCH RBC QN AUTO: 28.8 PG (ref 26–35)
MCH RBC QN AUTO: 28.9 PG (ref 26–35)
MCH RBC QN AUTO: 29 PG (ref 26–35)
MCH RBC QN AUTO: 29.1 PG (ref 26–35)
MCHC RBC AUTO-ENTMCNC: 31.1 % (ref 32–34.5)
MCHC RBC AUTO-ENTMCNC: 31.2 % (ref 32–34.5)
MCHC RBC AUTO-ENTMCNC: 31.6 % (ref 32–34.5)
MCHC RBC AUTO-ENTMCNC: 31.8 % (ref 32–34.5)
MCHC RBC AUTO-ENTMCNC: 31.9 % (ref 32–34.5)
MCHC RBC AUTO-ENTMCNC: 32 % (ref 32–34.5)
MCHC RBC AUTO-ENTMCNC: 32.1 % (ref 32–34.5)
MCHC RBC AUTO-ENTMCNC: 32.1 % (ref 32–34.5)
MCHC RBC AUTO-ENTMCNC: 32.5 % (ref 32–34.5)
MCHC RBC AUTO-ENTMCNC: 32.6 % (ref 32–34.5)
MCHC RBC AUTO-ENTMCNC: 32.7 % (ref 32–34.5)
MCHC RBC AUTO-ENTMCNC: 32.9 % (ref 32–34.5)
MCHC RBC AUTO-ENTMCNC: 33 % (ref 32–34.5)
MCV RBC AUTO: 87.3 FL (ref 80–99.9)
MCV RBC AUTO: 87.6 FL (ref 80–99.9)
MCV RBC AUTO: 88.1 FL (ref 80–99.9)
MCV RBC AUTO: 88.3 FL (ref 80–99.9)
MCV RBC AUTO: 88.3 FL (ref 80–99.9)
MCV RBC AUTO: 88.4 FL (ref 80–99.9)
MCV RBC AUTO: 88.6 FL (ref 80–99.9)
MCV RBC AUTO: 88.6 FL (ref 80–99.9)
MCV RBC AUTO: 88.7 FL (ref 80–99.9)
MCV RBC AUTO: 88.8 FL (ref 80–99.9)
MCV RBC AUTO: 89 FL (ref 80–99.9)
MCV RBC AUTO: 89 FL (ref 80–99.9)
MCV RBC AUTO: 89.2 FL (ref 80–99.9)
MCV RBC AUTO: 89.2 FL (ref 80–99.9)
MCV RBC AUTO: 89.5 FL (ref 80–99.9)
MCV RBC AUTO: 89.5 FL (ref 80–99.9)
MCV RBC AUTO: 90 FL (ref 80–99.9)
MCV RBC AUTO: 90.3 FL (ref 80–99.9)
MCV RBC AUTO: 91.3 FL (ref 80–99.9)
MCV RBC AUTO: 91.9 FL (ref 80–99.9)
MCV RBC AUTO: 92.2 FL (ref 80–99.9)
METAMYELOCYTES RELATIVE PERCENT: 0.9 % (ref 0–1)
METER GLUCOSE: 104 MG/DL (ref 74–99)
METHB: 0.4 % (ref 0–1.5)
MODE: ABNORMAL
MONOCYTES ABSOLUTE: 0.16 E9/L (ref 0.1–0.95)
MONOCYTES ABSOLUTE: 0.46 E9/L (ref 0.1–0.95)
MONOCYTES ABSOLUTE: 0.48 E9/L (ref 0.1–0.95)
MONOCYTES ABSOLUTE: 0.5 E9/L (ref 0.1–0.95)
MONOCYTES ABSOLUTE: 0.53 E9/L (ref 0.1–0.95)
MONOCYTES ABSOLUTE: 0.55 E9/L (ref 0.1–0.95)
MONOCYTES ABSOLUTE: 0.56 E9/L (ref 0.1–0.95)
MONOCYTES ABSOLUTE: 0.58 E9/L (ref 0.1–0.95)
MONOCYTES ABSOLUTE: 0.6 E9/L (ref 0.1–0.95)
MONOCYTES RELATIVE PERCENT: 0.9 % (ref 2–12)
MONOCYTES RELATIVE PERCENT: 4.3 % (ref 2–12)
MONOCYTES RELATIVE PERCENT: 4.9 % (ref 2–12)
MONOCYTES RELATIVE PERCENT: 5.2 % (ref 2–12)
MONOCYTES RELATIVE PERCENT: 5.7 % (ref 2–12)
MONOCYTES RELATIVE PERCENT: 6.1 % (ref 2–12)
MONOCYTES RELATIVE PERCENT: 6.2 % (ref 2–12)
MONOCYTES RELATIVE PERCENT: 6.4 % (ref 2–12)
MONOCYTES RELATIVE PERCENT: 8.2 % (ref 2–12)
MRSA CULTURE ONLY: NORMAL
NEUTROPHILS ABSOLUTE: 13.49 E9/L (ref 1.8–7.3)
NEUTROPHILS ABSOLUTE: 16.14 E9/L (ref 1.8–7.3)
NEUTROPHILS ABSOLUTE: 5.57 E9/L (ref 1.8–7.3)
NEUTROPHILS ABSOLUTE: 6.08 E9/L (ref 1.8–7.3)
NEUTROPHILS ABSOLUTE: 6.45 E9/L (ref 1.8–7.3)
NEUTROPHILS ABSOLUTE: 7.15 E9/L (ref 1.8–7.3)
NEUTROPHILS ABSOLUTE: 7.85 E9/L (ref 1.8–7.3)
NEUTROPHILS ABSOLUTE: 8.02 E9/L (ref 1.8–7.3)
NEUTROPHILS ABSOLUTE: 9.81 E9/L (ref 1.8–7.3)
NEUTROPHILS RELATIVE PERCENT: 76.5 % (ref 43–80)
NEUTROPHILS RELATIVE PERCENT: 79.8 % (ref 43–80)
NEUTROPHILS RELATIVE PERCENT: 80.3 % (ref 43–80)
NEUTROPHILS RELATIVE PERCENT: 83 % (ref 43–80)
NEUTROPHILS RELATIVE PERCENT: 84.6 % (ref 43–80)
NEUTROPHILS RELATIVE PERCENT: 86.1 % (ref 43–80)
NEUTROPHILS RELATIVE PERCENT: 87 % (ref 43–80)
NEUTROPHILS RELATIVE PERCENT: 92.2 % (ref 43–80)
NEUTROPHILS RELATIVE PERCENT: 99.1 % (ref 43–80)
NUCLEATED RED BLOOD CELLS: 0.9 /100 WBC
O2 CONTENT: 13 ML/DL
O2 SATURATION: 96 % (ref 92–98.5)
O2HB: 95.5 % (ref 94–97)
OPERATOR ID: 1926
ORGANISM: ABNORMAL
OVALOCYTES: ABNORMAL
OVALOCYTES: ABNORMAL
PATIENT TEMP: 37 C
PCO2: 29.9 MMHG (ref 35–45)
PDW BLD-RTO: 14.6 FL (ref 11.5–15)
PDW BLD-RTO: 14.6 FL (ref 11.5–15)
PDW BLD-RTO: 14.7 FL (ref 11.5–15)
PDW BLD-RTO: 14.7 FL (ref 11.5–15)
PDW BLD-RTO: 14.8 FL (ref 11.5–15)
PDW BLD-RTO: 14.9 FL (ref 11.5–15)
PDW BLD-RTO: 15 FL (ref 11.5–15)
PDW BLD-RTO: 15.1 FL (ref 11.5–15)
PDW BLD-RTO: 15.1 FL (ref 11.5–15)
PDW BLD-RTO: 15.2 FL (ref 11.5–15)
PDW BLD-RTO: 15.2 FL (ref 11.5–15)
PDW BLD-RTO: 15.3 FL (ref 11.5–15)
PDW BLD-RTO: 15.7 FL (ref 11.5–15)
PDW BLD-RTO: 15.9 FL (ref 11.5–15)
PDW BLD-RTO: 16.5 FL (ref 11.5–15)
PH BLOOD GAS: 7.47 (ref 7.35–7.45)
PHOSPHORUS: 2 MG/DL (ref 2.5–4.5)
PHOSPHORUS: 2 MG/DL (ref 2.5–4.5)
PHOSPHORUS: 2.4 MG/DL (ref 2.5–4.5)
PHOSPHORUS: 2.4 MG/DL (ref 2.5–4.5)
PHOSPHORUS: 2.5 MG/DL (ref 2.5–4.5)
PHOSPHORUS: 2.8 MG/DL (ref 2.5–4.5)
PHOSPHORUS: 2.9 MG/DL (ref 2.5–4.5)
PLATELET # BLD: 253 E9/L (ref 130–450)
PLATELET # BLD: 255 E9/L (ref 130–450)
PLATELET # BLD: 261 E9/L (ref 130–450)
PLATELET # BLD: 266 E9/L (ref 130–450)
PLATELET # BLD: 272 E9/L (ref 130–450)
PLATELET # BLD: 276 E9/L (ref 130–450)
PLATELET # BLD: 287 E9/L (ref 130–450)
PLATELET # BLD: 288 E9/L (ref 130–450)
PLATELET # BLD: 289 E9/L (ref 130–450)
PLATELET # BLD: 294 E9/L (ref 130–450)
PLATELET # BLD: 300 E9/L (ref 130–450)
PLATELET # BLD: 305 E9/L (ref 130–450)
PLATELET # BLD: 309 E9/L (ref 130–450)
PLATELET # BLD: 311 E9/L (ref 130–450)
PLATELET # BLD: 317 E9/L (ref 130–450)
PLATELET # BLD: 319 E9/L (ref 130–450)
PLATELET # BLD: 322 E9/L (ref 130–450)
PLATELET # BLD: 328 E9/L (ref 130–450)
PLATELET # BLD: 330 E9/L (ref 130–450)
PLATELET # BLD: 353 E9/L (ref 130–450)
PLATELET # BLD: 396 E9/L (ref 130–450)
PMV BLD AUTO: 10 FL (ref 7–12)
PMV BLD AUTO: 10.3 FL (ref 7–12)
PMV BLD AUTO: 8.9 FL (ref 7–12)
PMV BLD AUTO: 9 FL (ref 7–12)
PMV BLD AUTO: 9.1 FL (ref 7–12)
PMV BLD AUTO: 9.1 FL (ref 7–12)
PMV BLD AUTO: 9.2 FL (ref 7–12)
PMV BLD AUTO: 9.3 FL (ref 7–12)
PMV BLD AUTO: 9.4 FL (ref 7–12)
PMV BLD AUTO: 9.4 FL (ref 7–12)
PMV BLD AUTO: 9.5 FL (ref 7–12)
PMV BLD AUTO: 9.6 FL (ref 7–12)
PMV BLD AUTO: 9.7 FL (ref 7–12)
PMV BLD AUTO: 9.8 FL (ref 7–12)
PO2: 81.5 MMHG (ref 60–100)
POIKILOCYTES: ABNORMAL
POLYCHROMASIA: ABNORMAL
POTASSIUM REFLEX MAGNESIUM: 4.5 MMOL/L (ref 3.5–5)
POTASSIUM SERPL-SCNC: 2.6 MMOL/L (ref 3.5–5)
POTASSIUM SERPL-SCNC: 3.2 MMOL/L (ref 3.5–5)
POTASSIUM SERPL-SCNC: 3.4 MMOL/L (ref 3.5–5)
POTASSIUM SERPL-SCNC: 3.4 MMOL/L (ref 3.5–5)
POTASSIUM SERPL-SCNC: 3.6 MMOL/L (ref 3.5–5)
POTASSIUM SERPL-SCNC: 3.6 MMOL/L (ref 3.5–5)
POTASSIUM SERPL-SCNC: 3.8 MMOL/L (ref 3.5–5)
POTASSIUM SERPL-SCNC: 4.2 MMOL/L (ref 3.5–5)
POTASSIUM SERPL-SCNC: 4.2 MMOL/L (ref 3.5–5)
POTASSIUM SERPL-SCNC: 4.4 MMOL/L (ref 3.5–5)
POTASSIUM SERPL-SCNC: 4.6 MMOL/L (ref 3.5–5)
POTASSIUM SERPL-SCNC: 4.9 MMOL/L (ref 3.5–5)
PROTHROMBIN TIME: 16.8 SEC (ref 9.3–12.4)
PROTHROMBIN TIME: 22.1 SEC (ref 9.3–12.4)
R (REACTION TIME): 4.2 MIN (ref 5–10)
R (REACTION TIME): 5.4 MIN (ref 5–10)
RBC # BLD: 1.85 E12/L (ref 3.8–5.8)
RBC # BLD: 2.3 E12/L (ref 3.8–5.8)
RBC # BLD: 2.4 E12/L (ref 3.8–5.8)
RBC # BLD: 2.58 E12/L (ref 3.8–5.8)
RBC # BLD: 2.59 E12/L (ref 3.8–5.8)
RBC # BLD: 2.65 E12/L (ref 3.8–5.8)
RBC # BLD: 2.68 E12/L (ref 3.8–5.8)
RBC # BLD: 2.71 E12/L (ref 3.8–5.8)
RBC # BLD: 2.73 E12/L (ref 3.8–5.8)
RBC # BLD: 2.77 E12/L (ref 3.8–5.8)
RBC # BLD: 2.77 E12/L (ref 3.8–5.8)
RBC # BLD: 2.78 E12/L (ref 3.8–5.8)
RBC # BLD: 2.81 E12/L (ref 3.8–5.8)
RBC # BLD: 2.81 E12/L (ref 3.8–5.8)
RBC # BLD: 2.82 E12/L (ref 3.8–5.8)
RBC # BLD: 2.84 E12/L (ref 3.8–5.8)
RBC # BLD: 2.9 E12/L (ref 3.8–5.8)
RBC # BLD: 2.97 E12/L (ref 3.8–5.8)
RBC # BLD: 3.02 E12/L (ref 3.8–5.8)
RBC # BLD: 3.1 E12/L (ref 3.8–5.8)
RBC # BLD: 3.1 E12/L (ref 3.8–5.8)
REASON FOR REJECTION: NORMAL
REJECTED TEST: NORMAL
SCHISTOCYTES: ABNORMAL
SCHISTOCYTES: ABNORMAL
SODIUM BLD-SCNC: 136 MMOL/L (ref 132–146)
SODIUM BLD-SCNC: 137 MMOL/L (ref 132–146)
SODIUM BLD-SCNC: 138 MMOL/L (ref 132–146)
SODIUM BLD-SCNC: 138 MMOL/L (ref 132–146)
SODIUM BLD-SCNC: 139 MMOL/L (ref 132–146)
SODIUM BLD-SCNC: 139 MMOL/L (ref 132–146)
SODIUM BLD-SCNC: 140 MMOL/L (ref 132–146)
SODIUM BLD-SCNC: 140 MMOL/L (ref 132–146)
SODIUM BLD-SCNC: 141 MMOL/L (ref 132–146)
SOURCE, BLOOD GAS: ABNORMAL
TARGET CELLS: ABNORMAL
TEAR DROP CELLS: ABNORMAL
THB: 9.6 G/DL (ref 11.5–16.5)
TIME ANALYZED: 1510
TOTAL PROTEIN: 3.7 G/DL (ref 6.4–8.3)
TOTAL PROTEIN: 4.9 G/DL (ref 6.4–8.3)
TOTAL PROTEIN: 5 G/DL (ref 6.4–8.3)
TOTAL PROTEIN: 5 G/DL (ref 6.4–8.3)
TOTAL PROTEIN: 5.1 G/DL (ref 6.4–8.3)
TOTAL PROTEIN: 5.2 G/DL (ref 6.4–8.3)
TOTAL PROTEIN: 5.3 G/DL (ref 6.4–8.3)
TOTAL PROTEIN: 5.5 G/DL (ref 6.4–8.3)
TOTAL PROTEIN: 6 G/DL (ref 6.4–8.3)
TOTAL PROTEIN: 6 G/DL (ref 6.4–8.3)
TRIGL SERPL-MCNC: 36 MG/DL (ref 0–149)
TROPONIN: 0.1 NG/ML (ref 0–0.03)
TROPONIN: 0.1 NG/ML (ref 0–0.03)
TROPONIN: 0.11 NG/ML (ref 0–0.03)
TROPONIN: 0.11 NG/ML (ref 0–0.03)
WBC # BLD: 10.3 E9/L (ref 4.5–11.5)
WBC # BLD: 10.5 E9/L (ref 4.5–11.5)
WBC # BLD: 11.1 E9/L (ref 4.5–11.5)
WBC # BLD: 11.3 E9/L (ref 4.5–11.5)
WBC # BLD: 11.4 E9/L (ref 4.5–11.5)
WBC # BLD: 11.5 E9/L (ref 4.5–11.5)
WBC # BLD: 12.5 E9/L (ref 4.5–11.5)
WBC # BLD: 14.5 E9/L (ref 4.5–11.5)
WBC # BLD: 16.3 E9/L (ref 4.5–11.5)
WBC # BLD: 4.8 E9/L (ref 4.5–11.5)
WBC # BLD: 5.1 E9/L (ref 4.5–11.5)
WBC # BLD: 7.3 E9/L (ref 4.5–11.5)
WBC # BLD: 7.5 E9/L (ref 4.5–11.5)
WBC # BLD: 7.6 E9/L (ref 4.5–11.5)
WBC # BLD: 7.8 E9/L (ref 4.5–11.5)
WBC # BLD: 8.4 E9/L (ref 4.5–11.5)
WBC # BLD: 8.7 E9/L (ref 4.5–11.5)
WBC # BLD: 9 E9/L (ref 4.5–11.5)
WBC # BLD: 9.3 E9/L (ref 4.5–11.5)
WBC # BLD: 9.3 E9/L (ref 4.5–11.5)
WBC # BLD: 9.7 E9/L (ref 4.5–11.5)

## 2020-01-01 PROCEDURE — 6370000000 HC RX 637 (ALT 250 FOR IP): Performed by: SURGERY

## 2020-01-01 PROCEDURE — 85384 FIBRINOGEN ACTIVITY: CPT

## 2020-01-01 PROCEDURE — A0425 GROUND MILEAGE: HCPCS

## 2020-01-01 PROCEDURE — 82330 ASSAY OF CALCIUM: CPT

## 2020-01-01 PROCEDURE — 2580000003 HC RX 258: Performed by: STUDENT IN AN ORGANIZED HEALTH CARE EDUCATION/TRAINING PROGRAM

## 2020-01-01 PROCEDURE — 36415 COLL VENOUS BLD VENIPUNCTURE: CPT

## 2020-01-01 PROCEDURE — 2000000000 HC ICU R&B

## 2020-01-01 PROCEDURE — P9016 RBC LEUKOCYTES REDUCED: HCPCS

## 2020-01-01 PROCEDURE — 80053 COMPREHEN METABOLIC PANEL: CPT

## 2020-01-01 PROCEDURE — 7100000001 HC PACU RECOVERY - ADDTL 15 MIN: Performed by: SURGERY

## 2020-01-01 PROCEDURE — 6370000000 HC RX 637 (ALT 250 FOR IP): Performed by: HOSPITALIST

## 2020-01-01 PROCEDURE — P9059 PLASMA, FRZ BETWEEN 8-24HOUR: HCPCS

## 2020-01-01 PROCEDURE — 6370000000 HC RX 637 (ALT 250 FOR IP): Performed by: INTERNAL MEDICINE

## 2020-01-01 PROCEDURE — C9113 INJ PANTOPRAZOLE SODIUM, VIA: HCPCS | Performed by: HOSPITALIST

## 2020-01-01 PROCEDURE — 97535 SELF CARE MNGMENT TRAINING: CPT

## 2020-01-01 PROCEDURE — 2580000003 HC RX 258: Performed by: RADIOLOGY

## 2020-01-01 PROCEDURE — 6360000002 HC RX W HCPCS: Performed by: INTERNAL MEDICINE

## 2020-01-01 PROCEDURE — 94640 AIRWAY INHALATION TREATMENT: CPT

## 2020-01-01 PROCEDURE — 85027 COMPLETE CBC AUTOMATED: CPT

## 2020-01-01 PROCEDURE — 2580000003 HC RX 258

## 2020-01-01 PROCEDURE — 7100000000 HC PACU RECOVERY - FIRST 15 MIN: Performed by: SURGERY

## 2020-01-01 PROCEDURE — 51702 INSERT TEMP BLADDER CATH: CPT

## 2020-01-01 PROCEDURE — 0D1N4Z4 BYPASS SIGMOID COLON TO CUTANEOUS, PERCUTANEOUS ENDOSCOPIC APPROACH: ICD-10-PCS | Performed by: FAMILY MEDICINE

## 2020-01-01 PROCEDURE — 97166 OT EVAL MOD COMPLEX 45 MIN: CPT

## 2020-01-01 PROCEDURE — 6360000002 HC RX W HCPCS: Performed by: SURGERY

## 2020-01-01 PROCEDURE — C9113 INJ PANTOPRAZOLE SODIUM, VIA: HCPCS | Performed by: SURGERY

## 2020-01-01 PROCEDURE — 2500000003 HC RX 250 WO HCPCS: Performed by: INTERNAL MEDICINE

## 2020-01-01 PROCEDURE — 85025 COMPLETE CBC W/AUTO DIFF WBC: CPT

## 2020-01-01 PROCEDURE — C9113 INJ PANTOPRAZOLE SODIUM, VIA: HCPCS | Performed by: PREVENTIVE MEDICINE

## 2020-01-01 PROCEDURE — 36430 TRANSFUSION BLD/BLD COMPNT: CPT

## 2020-01-01 PROCEDURE — 94664 DEMO&/EVAL PT USE INHALER: CPT

## 2020-01-01 PROCEDURE — 2580000003 HC RX 258: Performed by: PREVENTIVE MEDICINE

## 2020-01-01 PROCEDURE — 2140000000 HC CCU INTERMEDIATE R&B

## 2020-01-01 PROCEDURE — 2500000003 HC RX 250 WO HCPCS

## 2020-01-01 PROCEDURE — 2580000003 HC RX 258: Performed by: SURGERY

## 2020-01-01 PROCEDURE — 87081 CULTURE SCREEN ONLY: CPT

## 2020-01-01 PROCEDURE — 0DH68UZ INSERTION OF FEEDING DEVICE INTO STOMACH, VIA NATURAL OR ARTIFICIAL OPENING ENDOSCOPIC: ICD-10-PCS | Performed by: SURGERY

## 2020-01-01 PROCEDURE — 2580000003 HC RX 258: Performed by: HOSPITALIST

## 2020-01-01 PROCEDURE — 99291 CRITICAL CARE FIRST HOUR: CPT | Performed by: SURGERY

## 2020-01-01 PROCEDURE — 2500000003 HC RX 250 WO HCPCS: Performed by: SURGERY

## 2020-01-01 PROCEDURE — 1200000000 HC SEMI PRIVATE

## 2020-01-01 PROCEDURE — 6370000000 HC RX 637 (ALT 250 FOR IP): Performed by: FAMILY MEDICINE

## 2020-01-01 PROCEDURE — 0HB8XZZ EXCISION OF BUTTOCK SKIN, EXTERNAL APPROACH: ICD-10-PCS | Performed by: SURGERY

## 2020-01-01 PROCEDURE — 83735 ASSAY OF MAGNESIUM: CPT

## 2020-01-01 PROCEDURE — 84100 ASSAY OF PHOSPHORUS: CPT

## 2020-01-01 PROCEDURE — 2709999900 HC NON-CHARGEABLE SUPPLY: Performed by: SURGERY

## 2020-01-01 PROCEDURE — 84484 ASSAY OF TROPONIN QUANT: CPT

## 2020-01-01 PROCEDURE — 83605 ASSAY OF LACTIC ACID: CPT

## 2020-01-01 PROCEDURE — 96374 THER/PROPH/DIAG INJ IV PUSH: CPT

## 2020-01-01 PROCEDURE — 6370000000 HC RX 637 (ALT 250 FOR IP): Performed by: STUDENT IN AN ORGANIZED HEALTH CARE EDUCATION/TRAINING PROGRAM

## 2020-01-01 PROCEDURE — 86920 COMPATIBILITY TEST SPIN: CPT

## 2020-01-01 PROCEDURE — 3600000012 HC SURGERY LEVEL 2 ADDTL 15MIN: Performed by: SURGERY

## 2020-01-01 PROCEDURE — 6360000002 HC RX W HCPCS: Performed by: HOSPITALIST

## 2020-01-01 PROCEDURE — 3700000000 HC ANESTHESIA ATTENDED CARE: Performed by: SURGERY

## 2020-01-01 PROCEDURE — 99233 SBSQ HOSP IP/OBS HIGH 50: CPT | Performed by: SURGERY

## 2020-01-01 PROCEDURE — 6360000002 HC RX W HCPCS: Performed by: PREVENTIVE MEDICINE

## 2020-01-01 PROCEDURE — 85576 BLOOD PLATELET AGGREGATION: CPT

## 2020-01-01 PROCEDURE — 3600007511: Performed by: SURGERY

## 2020-01-01 PROCEDURE — 97161 PT EVAL LOW COMPLEX 20 MIN: CPT

## 2020-01-01 PROCEDURE — 97168 OT RE-EVAL EST PLAN CARE: CPT

## 2020-01-01 PROCEDURE — 86901 BLOOD TYPING SEROLOGIC RH(D): CPT

## 2020-01-01 PROCEDURE — 85018 HEMOGLOBIN: CPT

## 2020-01-01 PROCEDURE — 36592 COLLECT BLOOD FROM PICC: CPT

## 2020-01-01 PROCEDURE — 99231 SBSQ HOSP IP/OBS SF/LOW 25: CPT | Performed by: CLINICAL NURSE SPECIALIST

## 2020-01-01 PROCEDURE — P9047 ALBUMIN (HUMAN), 25%, 50ML: HCPCS | Performed by: INTERNAL MEDICINE

## 2020-01-01 PROCEDURE — 86923 COMPATIBILITY TEST ELECTRIC: CPT

## 2020-01-01 PROCEDURE — 86900 BLOOD TYPING SEROLOGIC ABO: CPT

## 2020-01-01 PROCEDURE — 85014 HEMATOCRIT: CPT

## 2020-01-01 PROCEDURE — 88305 TISSUE EXAM BY PATHOLOGIST: CPT

## 2020-01-01 PROCEDURE — 3600000002 HC SURGERY LEVEL 2 BASE: Performed by: SURGERY

## 2020-01-01 PROCEDURE — 74174 CTA ABD&PLVS W/CONTRAST: CPT

## 2020-01-01 PROCEDURE — 3700000001 HC ADD 15 MINUTES (ANESTHESIA): Performed by: SURGERY

## 2020-01-01 PROCEDURE — 7100000001 HC PACU RECOVERY - ADDTL 15 MIN

## 2020-01-01 PROCEDURE — 93010 ELECTROCARDIOGRAM REPORT: CPT | Performed by: INTERNAL MEDICINE

## 2020-01-01 PROCEDURE — 02HV33Z INSERTION OF INFUSION DEVICE INTO SUPERIOR VENA CAVA, PERCUTANEOUS APPROACH: ICD-10-PCS | Performed by: FAMILY MEDICINE

## 2020-01-01 PROCEDURE — 85347 COAGULATION TIME ACTIVATED: CPT

## 2020-01-01 PROCEDURE — 7100000000 HC PACU RECOVERY - FIRST 15 MIN

## 2020-01-01 PROCEDURE — 92526 ORAL FUNCTION THERAPY: CPT

## 2020-01-01 PROCEDURE — 2580000003 HC RX 258: Performed by: ANESTHESIOLOGIST ASSISTANT

## 2020-01-01 PROCEDURE — 85730 THROMBOPLASTIN TIME PARTIAL: CPT

## 2020-01-01 PROCEDURE — 82962 GLUCOSE BLOOD TEST: CPT

## 2020-01-01 PROCEDURE — 97164 PT RE-EVAL EST PLAN CARE: CPT

## 2020-01-01 PROCEDURE — 2580000003 HC RX 258: Performed by: INTERNAL MEDICINE

## 2020-01-01 PROCEDURE — 85610 PROTHROMBIN TIME: CPT

## 2020-01-01 PROCEDURE — 97530 THERAPEUTIC ACTIVITIES: CPT

## 2020-01-01 PROCEDURE — 94761 N-INVAS EAR/PLS OXIMETRY MLT: CPT

## 2020-01-01 PROCEDURE — 43246 EGD PLACE GASTROSTOMY TUBE: CPT | Performed by: SURGERY

## 2020-01-01 PROCEDURE — 36600 WITHDRAWAL OF ARTERIAL BLOOD: CPT

## 2020-01-01 PROCEDURE — 86850 RBC ANTIBODY SCREEN: CPT

## 2020-01-01 PROCEDURE — 99223 1ST HOSP IP/OBS HIGH 75: CPT | Performed by: NURSE PRACTITIONER

## 2020-01-01 PROCEDURE — 80048 BASIC METABOLIC PNL TOTAL CA: CPT

## 2020-01-01 PROCEDURE — 6360000004 HC RX CONTRAST MEDICATION: Performed by: RADIOLOGY

## 2020-01-01 PROCEDURE — 93005 ELECTROCARDIOGRAM TRACING: CPT | Performed by: STUDENT IN AN ORGANIZED HEALTH CARE EDUCATION/TRAINING PROGRAM

## 2020-01-01 PROCEDURE — 3609013300 HC EGD TUBE PLACEMENT: Performed by: SURGERY

## 2020-01-01 PROCEDURE — 97112 NEUROMUSCULAR REEDUCATION: CPT

## 2020-01-01 PROCEDURE — 6360000002 HC RX W HCPCS

## 2020-01-01 PROCEDURE — 84478 ASSAY OF TRIGLYCERIDES: CPT

## 2020-01-01 PROCEDURE — 3600007501: Performed by: SURGERY

## 2020-01-01 PROCEDURE — 83690 ASSAY OF LIPASE: CPT

## 2020-01-01 PROCEDURE — 94760 N-INVAS EAR/PLS OXIMETRY 1: CPT

## 2020-01-01 PROCEDURE — 82805 BLOOD GASES W/O2 SATURATION: CPT

## 2020-01-01 PROCEDURE — 99284 EMERGENCY DEPT VISIT MOD MDM: CPT

## 2020-01-01 PROCEDURE — 97110 THERAPEUTIC EXERCISES: CPT

## 2020-01-01 PROCEDURE — 6360000002 HC RX W HCPCS: Performed by: ANESTHESIOLOGIST ASSISTANT

## 2020-01-01 PROCEDURE — A0427 ALS1-EMERGENCY: HCPCS

## 2020-01-01 PROCEDURE — 0DJ08ZZ INSPECTION OF UPPER INTESTINAL TRACT, VIA NATURAL OR ARTIFICIAL OPENING ENDOSCOPIC: ICD-10-PCS | Performed by: SURGERY

## 2020-01-01 RX ORDER — PANTOPRAZOLE SODIUM 40 MG/1
40 TABLET, DELAYED RELEASE ORAL
Status: DISCONTINUED | OUTPATIENT
Start: 2020-01-01 | End: 2020-01-01 | Stop reason: SDUPTHER

## 2020-01-01 RX ORDER — 0.9 % SODIUM CHLORIDE 0.9 %
250 INTRAVENOUS SOLUTION INTRAVENOUS ONCE
Status: DISCONTINUED | OUTPATIENT
Start: 2020-01-01 | End: 2020-01-01 | Stop reason: HOSPADM

## 2020-01-01 RX ORDER — MIDAZOLAM HYDROCHLORIDE 1 MG/ML
6 INJECTION INTRAMUSCULAR; INTRAVENOUS
Status: DISCONTINUED | OUTPATIENT
Start: 2020-01-01 | End: 2020-01-01

## 2020-01-01 RX ORDER — ALBUMIN (HUMAN) 12.5 G/50ML
25 SOLUTION INTRAVENOUS EVERY 8 HOURS
Status: DISCONTINUED | OUTPATIENT
Start: 2020-01-01 | End: 2020-01-01 | Stop reason: HOSPADM

## 2020-01-01 RX ORDER — SODIUM CHLORIDE 9 MG/ML
500 INJECTION, SOLUTION INTRAVENOUS ONCE
Status: COMPLETED | OUTPATIENT
Start: 2020-01-01 | End: 2020-01-01

## 2020-01-01 RX ORDER — DONEPEZIL HYDROCHLORIDE 5 MG/1
10 TABLET, FILM COATED ORAL NIGHTLY
Status: DISCONTINUED | OUTPATIENT
Start: 2020-01-01 | End: 2020-01-01 | Stop reason: HOSPADM

## 2020-01-01 RX ORDER — MAGNESIUM SULFATE IN WATER 40 MG/ML
2 INJECTION, SOLUTION INTRAVENOUS ONCE
Status: COMPLETED | OUTPATIENT
Start: 2020-01-01 | End: 2020-01-01

## 2020-01-01 RX ORDER — PETROLATUM 42 G/100G
OINTMENT TOPICAL 2 TIMES DAILY
Status: DISCONTINUED | OUTPATIENT
Start: 2020-01-01 | End: 2020-01-01 | Stop reason: HOSPADM

## 2020-01-01 RX ORDER — PETROLATUM 42 G/100G
OINTMENT TOPICAL 3 TIMES DAILY PRN
Status: DISCONTINUED | OUTPATIENT
Start: 2020-01-01 | End: 2020-01-01 | Stop reason: HOSPADM

## 2020-01-01 RX ORDER — POTASSIUM CHLORIDE 29.8 MG/ML
20 INJECTION INTRAVENOUS
Status: DISCONTINUED | OUTPATIENT
Start: 2020-01-01 | End: 2020-01-01 | Stop reason: CLARIF

## 2020-01-01 RX ORDER — PROPOFOL 10 MG/ML
INJECTION, EMULSION INTRAVENOUS PRN
Status: DISCONTINUED | OUTPATIENT
Start: 2020-01-01 | End: 2020-01-01 | Stop reason: SDUPTHER

## 2020-01-01 RX ORDER — ACETAMINOPHEN 160 MG/5ML
650 SOLUTION ORAL EVERY 4 HOURS PRN
Status: DISCONTINUED | OUTPATIENT
Start: 2020-01-01 | End: 2020-01-01 | Stop reason: HOSPADM

## 2020-01-01 RX ORDER — IPRATROPIUM BROMIDE AND ALBUTEROL SULFATE 2.5; .5 MG/3ML; MG/3ML
3 SOLUTION RESPIRATORY (INHALATION)
Qty: 360 ML | DISCHARGE
Start: 2020-01-01

## 2020-01-01 RX ORDER — PANTOPRAZOLE SODIUM 40 MG/10ML
40 INJECTION, POWDER, LYOPHILIZED, FOR SOLUTION INTRAVENOUS 2 TIMES DAILY
Status: DISCONTINUED | OUTPATIENT
Start: 2020-01-01 | End: 2020-01-01

## 2020-01-01 RX ORDER — TROSPIUM CHLORIDE 20 MG/1
20 TABLET, FILM COATED ORAL NIGHTLY
Status: DISCONTINUED | OUTPATIENT
Start: 2020-01-01 | End: 2020-01-01 | Stop reason: HOSPADM

## 2020-01-01 RX ORDER — 0.9 % SODIUM CHLORIDE 0.9 %
250 INTRAVENOUS SOLUTION INTRAVENOUS ONCE
Status: COMPLETED | OUTPATIENT
Start: 2020-01-01 | End: 2020-01-01

## 2020-01-01 RX ORDER — SODIUM CHLORIDE 9 MG/ML
10 INJECTION INTRAVENOUS DAILY
Status: DISCONTINUED | OUTPATIENT
Start: 2020-01-01 | End: 2020-01-01

## 2020-01-01 RX ORDER — IPRATROPIUM BROMIDE AND ALBUTEROL SULFATE 2.5; .5 MG/3ML; MG/3ML
3 SOLUTION RESPIRATORY (INHALATION)
Status: DISCONTINUED | OUTPATIENT
Start: 2020-01-01 | End: 2020-01-01 | Stop reason: HOSPADM

## 2020-01-01 RX ORDER — ASPIRIN 81 MG/1
81 TABLET, CHEWABLE ORAL DAILY
Status: DISCONTINUED | OUTPATIENT
Start: 2020-01-01 | End: 2020-01-01 | Stop reason: HOSPADM

## 2020-01-01 RX ORDER — 0.9 % SODIUM CHLORIDE 0.9 %
20 INTRAVENOUS SOLUTION INTRAVENOUS ONCE
Status: COMPLETED | OUTPATIENT
Start: 2020-01-01 | End: 2020-01-01

## 2020-01-01 RX ORDER — ASPIRIN 81 MG/1
81 TABLET ORAL DAILY
Status: DISCONTINUED | OUTPATIENT
Start: 2020-01-01 | End: 2020-01-01 | Stop reason: ALTCHOICE

## 2020-01-01 RX ORDER — PHENYLEPHRINE HYDROCHLORIDE 10 MG/ML
INJECTION INTRAVENOUS PRN
Status: DISCONTINUED | OUTPATIENT
Start: 2020-01-01 | End: 2020-01-01 | Stop reason: SDUPTHER

## 2020-01-01 RX ORDER — SODIUM CHLORIDE 9 MG/ML
INJECTION, SOLUTION INTRAVENOUS CONTINUOUS PRN
Status: DISCONTINUED | OUTPATIENT
Start: 2020-01-01 | End: 2020-01-01 | Stop reason: SDUPTHER

## 2020-01-01 RX ORDER — SODIUM CHLORIDE 9 MG/ML
INJECTION, SOLUTION INTRAVENOUS CONTINUOUS
Status: DISCONTINUED | OUTPATIENT
Start: 2020-01-01 | End: 2020-01-01

## 2020-01-01 RX ORDER — DEXTROSE, SODIUM CHLORIDE, AND POTASSIUM CHLORIDE 5; .45; .15 G/100ML; G/100ML; G/100ML
INJECTION INTRAVENOUS CONTINUOUS
Status: DISCONTINUED | OUTPATIENT
Start: 2020-01-01 | End: 2020-01-01

## 2020-01-01 RX ORDER — ROCURONIUM BROMIDE 10 MG/ML
INJECTION, SOLUTION INTRAVENOUS PRN
Status: DISCONTINUED | OUTPATIENT
Start: 2020-01-01 | End: 2020-01-01 | Stop reason: SDUPTHER

## 2020-01-01 RX ORDER — PANTOPRAZOLE SODIUM 40 MG/10ML
80 INJECTION, POWDER, LYOPHILIZED, FOR SOLUTION INTRAVENOUS ONCE
Status: COMPLETED | OUTPATIENT
Start: 2020-01-01 | End: 2020-01-01

## 2020-01-01 RX ORDER — LIDOCAINE HYDROCHLORIDE AND EPINEPHRINE 10; 10 MG/ML; UG/ML
INJECTION, SOLUTION INFILTRATION; PERINEURAL PRN
Status: DISCONTINUED | OUTPATIENT
Start: 2020-01-01 | End: 2020-01-01 | Stop reason: ALTCHOICE

## 2020-01-01 RX ORDER — NEOSTIGMINE METHYLSULFATE 1 MG/ML
INJECTION, SOLUTION INTRAVENOUS PRN
Status: DISCONTINUED | OUTPATIENT
Start: 2020-01-01 | End: 2020-01-01 | Stop reason: SDUPTHER

## 2020-01-01 RX ORDER — 0.9 % SODIUM CHLORIDE 0.9 %
1000 INTRAVENOUS SOLUTION INTRAVENOUS ONCE
Status: COMPLETED | OUTPATIENT
Start: 2020-01-01 | End: 2020-01-01

## 2020-01-01 RX ORDER — PETROLATUM 42 G/100G
OINTMENT TOPICAL 3 TIMES DAILY
Status: DISCONTINUED | OUTPATIENT
Start: 2020-01-01 | End: 2020-01-01 | Stop reason: HOSPADM

## 2020-01-01 RX ORDER — SODIUM CHLORIDE 0.9 % (FLUSH) 0.9 %
10 SYRINGE (ML) INJECTION PRN
Status: COMPLETED | OUTPATIENT
Start: 2020-01-01 | End: 2020-01-01

## 2020-01-01 RX ORDER — POTASSIUM CHLORIDE 7.45 MG/ML
10 INJECTION INTRAVENOUS
Status: COMPLETED | OUTPATIENT
Start: 2020-01-01 | End: 2020-01-01

## 2020-01-01 RX ORDER — SODIUM CHLORIDE 0.9 % (FLUSH) 0.9 %
SYRINGE (ML) INJECTION
Status: DISCONTINUED
Start: 2020-01-01 | End: 2020-01-01 | Stop reason: HOSPADM

## 2020-01-01 RX ORDER — LIDOCAINE HYDROCHLORIDE 20 MG/ML
INJECTION, SOLUTION INTRAVENOUS PRN
Status: DISCONTINUED | OUTPATIENT
Start: 2020-01-01 | End: 2020-01-01 | Stop reason: SDUPTHER

## 2020-01-01 RX ORDER — PANTOPRAZOLE SODIUM 40 MG/1
40 TABLET, DELAYED RELEASE ORAL
Status: DISCONTINUED | OUTPATIENT
Start: 2020-01-01 | End: 2020-01-01 | Stop reason: HOSPADM

## 2020-01-01 RX ORDER — GLYCOPYRROLATE 1 MG/5 ML
SYRINGE (ML) INTRAVENOUS PRN
Status: DISCONTINUED | OUTPATIENT
Start: 2020-01-01 | End: 2020-01-01 | Stop reason: SDUPTHER

## 2020-01-01 RX ORDER — ONDANSETRON 2 MG/ML
4 INJECTION INTRAMUSCULAR; INTRAVENOUS EVERY 6 HOURS PRN
Status: DISCONTINUED | OUTPATIENT
Start: 2020-01-01 | End: 2020-01-01 | Stop reason: HOSPADM

## 2020-01-01 RX ORDER — FENTANYL CITRATE 50 UG/ML
INJECTION, SOLUTION INTRAMUSCULAR; INTRAVENOUS PRN
Status: DISCONTINUED | OUTPATIENT
Start: 2020-01-01 | End: 2020-01-01 | Stop reason: SDUPTHER

## 2020-01-01 RX ORDER — DEXTROSE MONOHYDRATE 25 G/50ML
INJECTION, SOLUTION INTRAVENOUS PRN
Status: DISCONTINUED | OUTPATIENT
Start: 2020-01-01 | End: 2020-01-01 | Stop reason: SDUPTHER

## 2020-01-01 RX ORDER — FENTANYL CITRATE 50 UG/ML
200 INJECTION, SOLUTION INTRAMUSCULAR; INTRAVENOUS
Status: DISCONTINUED | OUTPATIENT
Start: 2020-01-01 | End: 2020-01-01

## 2020-01-01 RX ORDER — SODIUM CHLORIDE 0.9 % (FLUSH) 0.9 %
10 SYRINGE (ML) INJECTION EVERY 12 HOURS SCHEDULED
Status: DISCONTINUED | OUTPATIENT
Start: 2020-01-01 | End: 2020-01-01 | Stop reason: HOSPADM

## 2020-01-01 RX ORDER — PROPOFOL 10 MG/ML
INJECTION, EMULSION INTRAVENOUS CONTINUOUS PRN
Status: DISCONTINUED | OUTPATIENT
Start: 2020-01-01 | End: 2020-01-01 | Stop reason: SDUPTHER

## 2020-01-01 RX ORDER — SODIUM CHLORIDE 0.9 % (FLUSH) 0.9 %
10 SYRINGE (ML) INJECTION PRN
Status: DISCONTINUED | OUTPATIENT
Start: 2020-01-01 | End: 2020-01-01 | Stop reason: HOSPADM

## 2020-01-01 RX ORDER — MEMANTINE HYDROCHLORIDE 10 MG/1
10 TABLET ORAL 2 TIMES DAILY
Status: DISCONTINUED | OUTPATIENT
Start: 2020-01-01 | End: 2020-01-01 | Stop reason: HOSPADM

## 2020-01-01 RX ORDER — ACETAMINOPHEN 325 MG/1
650 TABLET ORAL EVERY 4 HOURS PRN
Status: DISCONTINUED | OUTPATIENT
Start: 2020-01-01 | End: 2020-01-01

## 2020-01-01 RX ADMIN — HEPARIN SODIUM 9 UNITS/KG/HR: 10000 INJECTION, SOLUTION INTRAVENOUS at 08:01

## 2020-01-01 RX ADMIN — ALBUMIN (HUMAN) 25 G: 0.25 INJECTION, SOLUTION INTRAVENOUS at 23:05

## 2020-01-01 RX ADMIN — SKIN PROTECTANT: 44 OINTMENT TOPICAL at 23:08

## 2020-01-01 RX ADMIN — POTASSIUM & SODIUM PHOSPHATES POWDER PACK 280-160-250 MG 500 MG: 280-160-250 PACK at 10:05

## 2020-01-01 RX ADMIN — MEMANTINE HYDROCHLORIDE 10 MG: 10 TABLET, FILM COATED ORAL at 21:31

## 2020-01-01 RX ADMIN — METRONIDAZOLE 500 MG: 500 INJECTION, SOLUTION INTRAVENOUS at 08:25

## 2020-01-01 RX ADMIN — SKIN PROTECTANT: 44 OINTMENT TOPICAL at 10:39

## 2020-01-01 RX ADMIN — IPRATROPIUM BROMIDE AND ALBUTEROL SULFATE 1 AMPULE: 2.5; .5 SOLUTION RESPIRATORY (INHALATION) at 11:12

## 2020-01-01 RX ADMIN — IPRATROPIUM BROMIDE AND ALBUTEROL SULFATE 3 ML: 2.5; .5 SOLUTION RESPIRATORY (INHALATION) at 06:18

## 2020-01-01 RX ADMIN — SODIUM CHLORIDE, PRESERVATIVE FREE 10 ML: 5 INJECTION INTRAVENOUS at 01:10

## 2020-01-01 RX ADMIN — METRONIDAZOLE 500 MG: 500 INJECTION, SOLUTION INTRAVENOUS at 01:10

## 2020-01-01 RX ADMIN — PETROLATUM: 42 OINTMENT TOPICAL at 08:05

## 2020-01-01 RX ADMIN — DEXTROSE, SODIUM CHLORIDE, AND POTASSIUM CHLORIDE: 5; .9; .15 INJECTION INTRAVENOUS at 17:57

## 2020-01-01 RX ADMIN — SODIUM PHOSPHATE, MONOBASIC, MONOHYDRATE 15 MMOL: 276; 142 INJECTION, SOLUTION INTRAVENOUS at 15:11

## 2020-01-01 RX ADMIN — POTASSIUM & SODIUM PHOSPHATES POWDER PACK 280-160-250 MG 500 MG: 280-160-250 PACK at 15:16

## 2020-01-01 RX ADMIN — FENTANYL CITRATE 50 MCG: 50 INJECTION, SOLUTION INTRAMUSCULAR; INTRAVENOUS at 12:11

## 2020-01-01 RX ADMIN — Medication 10 ML: at 08:40

## 2020-01-01 RX ADMIN — TROSPIUM CHLORIDE 20 MG: 20 TABLET, FILM COATED ORAL at 20:28

## 2020-01-01 RX ADMIN — MEMANTINE HYDROCHLORIDE 10 MG: 10 TABLET, FILM COATED ORAL at 23:56

## 2020-01-01 RX ADMIN — PROPOFOL 170 MG: 10 INJECTION, EMULSION INTRAVENOUS at 08:00

## 2020-01-01 RX ADMIN — SODIUM CHLORIDE 1000 ML: 9 INJECTION, SOLUTION INTRAVENOUS at 09:55

## 2020-01-01 RX ADMIN — CEFTRIAXONE 2 G: 2 INJECTION, POWDER, FOR SOLUTION INTRAMUSCULAR; INTRAVENOUS at 12:48

## 2020-01-01 RX ADMIN — PANTOPRAZOLE SODIUM 40 MG: 40 INJECTION, POWDER, FOR SOLUTION INTRAVENOUS at 20:34

## 2020-01-01 RX ADMIN — IPRATROPIUM BROMIDE AND ALBUTEROL SULFATE 1 AMPULE: 2.5; .5 SOLUTION RESPIRATORY (INHALATION) at 16:35

## 2020-01-01 RX ADMIN — MEMANTINE HYDROCHLORIDE 10 MG: 10 TABLET, FILM COATED ORAL at 08:57

## 2020-01-01 RX ADMIN — SODIUM CHLORIDE, PRESERVATIVE FREE 10 ML: 5 INJECTION INTRAVENOUS at 08:46

## 2020-01-01 RX ADMIN — DEXTROSE MONOHYDRATE, SODIUM CHLORIDE, AND POTASSIUM CHLORIDE: 50; 4.5; 1.49 INJECTION, SOLUTION INTRAVENOUS at 11:21

## 2020-01-01 RX ADMIN — PHENYLEPHRINE HYDROCHLORIDE 100 MCG: 10 INJECTION INTRAVENOUS at 12:49

## 2020-01-01 RX ADMIN — PANTOPRAZOLE SODIUM 40 MG: 40 INJECTION, POWDER, FOR SOLUTION INTRAVENOUS at 09:17

## 2020-01-01 RX ADMIN — PETROLATUM: 42 OINTMENT TOPICAL at 23:08

## 2020-01-01 RX ADMIN — ANTI-INHIBITOR COAGULANT COMPLEX 4265 UNITS: KIT at 17:10

## 2020-01-01 RX ADMIN — Medication 10 ML: at 09:17

## 2020-01-01 RX ADMIN — CEFTRIAXONE 2 G: 2 INJECTION, POWDER, FOR SOLUTION INTRAMUSCULAR; INTRAVENOUS at 16:03

## 2020-01-01 RX ADMIN — APIXABAN 5 MG: 5 TABLET, FILM COATED ORAL at 10:39

## 2020-01-01 RX ADMIN — SODIUM CHLORIDE 20 ML: 9 INJECTION, SOLUTION INTRAVENOUS at 08:58

## 2020-01-01 RX ADMIN — METRONIDAZOLE 500 MG: 500 INJECTION, SOLUTION INTRAVENOUS at 16:03

## 2020-01-01 RX ADMIN — PANTOPRAZOLE SODIUM 40 MG: 40 INJECTION, POWDER, FOR SOLUTION INTRAVENOUS at 08:52

## 2020-01-01 RX ADMIN — METRONIDAZOLE 500 MG: 500 INJECTION, SOLUTION INTRAVENOUS at 00:03

## 2020-01-01 RX ADMIN — IPRATROPIUM BROMIDE AND ALBUTEROL SULFATE 3 ML: 2.5; .5 SOLUTION RESPIRATORY (INHALATION) at 11:36

## 2020-01-01 RX ADMIN — DEXTROSE MONOHYDRATE 12.5 G: 25 INJECTION, SOLUTION INTRAVENOUS at 07:59

## 2020-01-01 RX ADMIN — CEFTRIAXONE SODIUM 1 G: 1 INJECTION, POWDER, FOR SOLUTION INTRAMUSCULAR; INTRAVENOUS at 17:00

## 2020-01-01 RX ADMIN — DEXTROSE MONOHYDRATE, SODIUM CHLORIDE, AND POTASSIUM CHLORIDE: 50; 4.5; 1.49 INJECTION, SOLUTION INTRAVENOUS at 20:22

## 2020-01-01 RX ADMIN — MEMANTINE HYDROCHLORIDE 10 MG: 10 TABLET, FILM COATED ORAL at 09:17

## 2020-01-01 RX ADMIN — PANTOPRAZOLE SODIUM 40 MG: 40 INJECTION, POWDER, FOR SOLUTION INTRAVENOUS at 20:54

## 2020-01-01 RX ADMIN — SKIN PROTECTANT: 44 OINTMENT TOPICAL at 18:37

## 2020-01-01 RX ADMIN — SODIUM CHLORIDE: 9 INJECTION, SOLUTION INTRAVENOUS at 05:45

## 2020-01-01 RX ADMIN — Medication 10 ML: at 20:54

## 2020-01-01 RX ADMIN — SODIUM CHLORIDE, PRESERVATIVE FREE 10 ML: 5 INJECTION INTRAVENOUS at 09:03

## 2020-01-01 RX ADMIN — SODIUM CHLORIDE, PRESERVATIVE FREE 10 ML: 5 INJECTION INTRAVENOUS at 17:02

## 2020-01-01 RX ADMIN — POTASSIUM & SODIUM PHOSPHATES POWDER PACK 280-160-250 MG 250 MG: 280-160-250 PACK at 20:47

## 2020-01-01 RX ADMIN — IPRATROPIUM BROMIDE AND ALBUTEROL SULFATE 3 ML: 2.5; .5 SOLUTION RESPIRATORY (INHALATION) at 15:57

## 2020-01-01 RX ADMIN — DEXTROSE MONOHYDRATE, SODIUM CHLORIDE, AND POTASSIUM CHLORIDE: 50; 4.5; 1.49 INJECTION, SOLUTION INTRAVENOUS at 08:52

## 2020-01-01 RX ADMIN — APIXABAN 5 MG: 5 TABLET, FILM COATED ORAL at 23:05

## 2020-01-01 RX ADMIN — Medication 10 ML: at 09:22

## 2020-01-01 RX ADMIN — MEMANTINE HYDROCHLORIDE 10 MG: 10 TABLET, FILM COATED ORAL at 21:33

## 2020-01-01 RX ADMIN — Medication 10 ML: at 21:45

## 2020-01-01 RX ADMIN — DONEPEZIL HYDROCHLORIDE 10 MG: 5 TABLET, FILM COATED ORAL at 20:34

## 2020-01-01 RX ADMIN — ROCURONIUM BROMIDE 30 MG: 10 INJECTION, SOLUTION INTRAVENOUS at 12:11

## 2020-01-01 RX ADMIN — HYDROMORPHONE HYDROCHLORIDE 0.2 MG: 1 INJECTION, SOLUTION INTRAMUSCULAR; INTRAVENOUS; SUBCUTANEOUS at 08:46

## 2020-01-01 RX ADMIN — PROPOFOL 50 MG: 10 INJECTION, EMULSION INTRAVENOUS at 09:03

## 2020-01-01 RX ADMIN — DEXTROSE MONOHYDRATE, SODIUM CHLORIDE, AND POTASSIUM CHLORIDE: 50; 4.5; 1.49 INJECTION, SOLUTION INTRAVENOUS at 23:39

## 2020-01-01 RX ADMIN — Medication 10 ML: at 20:34

## 2020-01-01 RX ADMIN — METRONIDAZOLE 500 MG: 500 INJECTION, SOLUTION INTRAVENOUS at 00:33

## 2020-01-01 RX ADMIN — Medication 20 MG: at 09:17

## 2020-01-01 RX ADMIN — Medication 10 ML: at 23:56

## 2020-01-01 RX ADMIN — POTASSIUM & SODIUM PHOSPHATES POWDER PACK 280-160-250 MG 500 MG: 280-160-250 PACK at 16:40

## 2020-01-01 RX ADMIN — MEMANTINE HYDROCHLORIDE 10 MG: 10 TABLET, FILM COATED ORAL at 08:27

## 2020-01-01 RX ADMIN — SODIUM CHLORIDE, PRESERVATIVE FREE 10 ML: 5 INJECTION INTRAVENOUS at 15:11

## 2020-01-01 RX ADMIN — SODIUM CHLORIDE 500 ML: 9 INJECTION, SOLUTION INTRAVENOUS at 21:53

## 2020-01-01 RX ADMIN — PETROLATUM: 42 OINTMENT TOPICAL at 09:20

## 2020-01-01 RX ADMIN — Medication 0.6 MG: at 12:52

## 2020-01-01 RX ADMIN — DEXTROSE MONOHYDRATE, SODIUM CHLORIDE, AND POTASSIUM CHLORIDE: 50; 4.5; 1.49 INJECTION, SOLUTION INTRAVENOUS at 05:01

## 2020-01-01 RX ADMIN — METRONIDAZOLE 500 MG: 500 INJECTION, SOLUTION INTRAVENOUS at 09:16

## 2020-01-01 RX ADMIN — IPRATROPIUM BROMIDE AND ALBUTEROL SULFATE 3 ML: 2.5; .5 SOLUTION RESPIRATORY (INHALATION) at 16:56

## 2020-01-01 RX ADMIN — IPRATROPIUM BROMIDE AND ALBUTEROL SULFATE 1 AMPULE: 2.5; .5 SOLUTION RESPIRATORY (INHALATION) at 09:22

## 2020-01-01 RX ADMIN — PANTOPRAZOLE SODIUM 40 MG: 40 INJECTION, POWDER, FOR SOLUTION INTRAVENOUS at 08:46

## 2020-01-01 RX ADMIN — DONEPEZIL HYDROCHLORIDE 10 MG: 5 TABLET, FILM COATED ORAL at 20:53

## 2020-01-01 RX ADMIN — POTASSIUM & SODIUM PHOSPHATES POWDER PACK 280-160-250 MG 500 MG: 280-160-250 PACK at 21:30

## 2020-01-01 RX ADMIN — METRONIDAZOLE 500 MG: 500 INJECTION, SOLUTION INTRAVENOUS at 09:18

## 2020-01-01 RX ADMIN — POTASSIUM BICARBONATE 20 MEQ: 782 TABLET, EFFERVESCENT ORAL at 15:10

## 2020-01-01 RX ADMIN — CEFTRIAXONE 2 G: 2 INJECTION, POWDER, FOR SOLUTION INTRAMUSCULAR; INTRAVENOUS at 11:22

## 2020-01-01 RX ADMIN — IPRATROPIUM BROMIDE AND ALBUTEROL SULFATE 3 ML: 2.5; .5 SOLUTION RESPIRATORY (INHALATION) at 20:01

## 2020-01-01 RX ADMIN — SODIUM CHLORIDE 250 ML: 9 INJECTION, SOLUTION INTRAVENOUS at 02:45

## 2020-01-01 RX ADMIN — Medication 10 ML: at 08:27

## 2020-01-01 RX ADMIN — POTASSIUM BICARBONATE 20 MEQ: 782 TABLET, EFFERVESCENT ORAL at 10:06

## 2020-01-01 RX ADMIN — METRONIDAZOLE 500 MG: 500 INJECTION, SOLUTION INTRAVENOUS at 08:07

## 2020-01-01 RX ADMIN — DONEPEZIL HYDROCHLORIDE 10 MG: 5 TABLET, FILM COATED ORAL at 21:34

## 2020-01-01 RX ADMIN — METRONIDAZOLE 500 MG: 500 INJECTION, SOLUTION INTRAVENOUS at 08:05

## 2020-01-01 RX ADMIN — DONEPEZIL HYDROCHLORIDE 10 MG: 5 TABLET, FILM COATED ORAL at 22:15

## 2020-01-01 RX ADMIN — PETROLATUM: 42 OINTMENT TOPICAL at 22:06

## 2020-01-01 RX ADMIN — IPRATROPIUM BROMIDE AND ALBUTEROL SULFATE 3 ML: 2.5; .5 SOLUTION RESPIRATORY (INHALATION) at 10:44

## 2020-01-01 RX ADMIN — PANTOPRAZOLE SODIUM 40 MG: 40 INJECTION, POWDER, FOR SOLUTION INTRAVENOUS at 22:00

## 2020-01-01 RX ADMIN — MEMANTINE HYDROCHLORIDE 10 MG: 10 TABLET, FILM COATED ORAL at 08:04

## 2020-01-01 RX ADMIN — MEMANTINE HYDROCHLORIDE 10 MG: 10 TABLET, FILM COATED ORAL at 20:46

## 2020-01-01 RX ADMIN — TROSPIUM CHLORIDE 20 MG: 20 TABLET, FILM COATED ORAL at 23:56

## 2020-01-01 RX ADMIN — POTASSIUM BICARBONATE 20 MEQ: 782 TABLET, EFFERVESCENT ORAL at 21:30

## 2020-01-01 RX ADMIN — IPRATROPIUM BROMIDE AND ALBUTEROL SULFATE 3 ML: 2.5; .5 SOLUTION RESPIRATORY (INHALATION) at 19:15

## 2020-01-01 RX ADMIN — ACETAMINOPHEN ORAL SOLUTION 650 MG: 650 SOLUTION ORAL at 00:02

## 2020-01-01 RX ADMIN — Medication 10 ML: at 09:18

## 2020-01-01 RX ADMIN — TROSPIUM CHLORIDE 20 MG: 20 TABLET, FILM COATED ORAL at 20:53

## 2020-01-01 RX ADMIN — IPRATROPIUM BROMIDE AND ALBUTEROL SULFATE 3 ML: 2.5; .5 SOLUTION RESPIRATORY (INHALATION) at 11:15

## 2020-01-01 RX ADMIN — IPRATROPIUM BROMIDE AND ALBUTEROL SULFATE 3 ML: 2.5; .5 SOLUTION RESPIRATORY (INHALATION) at 15:58

## 2020-01-01 RX ADMIN — SODIUM CHLORIDE 250 ML: 9 INJECTION, SOLUTION INTRAVENOUS at 03:45

## 2020-01-01 RX ADMIN — POTASSIUM CHLORIDE 10 MEQ: 10 INJECTION, SOLUTION INTRAVENOUS at 09:04

## 2020-01-01 RX ADMIN — MEMANTINE HYDROCHLORIDE 10 MG: 10 TABLET, FILM COATED ORAL at 09:18

## 2020-01-01 RX ADMIN — DEXTROSE, SODIUM CHLORIDE, AND POTASSIUM CHLORIDE: 5; .9; .15 INJECTION INTRAVENOUS at 03:45

## 2020-01-01 RX ADMIN — PANTOPRAZOLE SODIUM 40 MG: 40 INJECTION, POWDER, FOR SOLUTION INTRAVENOUS at 09:46

## 2020-01-01 RX ADMIN — Medication 3 MG: at 12:52

## 2020-01-01 RX ADMIN — IPRATROPIUM BROMIDE AND ALBUTEROL SULFATE 1 AMPULE: 2.5; .5 SOLUTION RESPIRATORY (INHALATION) at 17:31

## 2020-01-01 RX ADMIN — METRONIDAZOLE 500 MG: 500 INJECTION, SOLUTION INTRAVENOUS at 23:41

## 2020-01-01 RX ADMIN — POTASSIUM CHLORIDE 10 MEQ: 10 INJECTION, SOLUTION INTRAVENOUS at 07:06

## 2020-01-01 RX ADMIN — SODIUM CHLORIDE: 9 INJECTION, SOLUTION INTRAVENOUS at 07:51

## 2020-01-01 RX ADMIN — CEFTRIAXONE 2 G: 2 INJECTION, POWDER, FOR SOLUTION INTRAMUSCULAR; INTRAVENOUS at 15:36

## 2020-01-01 RX ADMIN — IPRATROPIUM BROMIDE AND ALBUTEROL SULFATE 3 ML: 2.5; .5 SOLUTION RESPIRATORY (INHALATION) at 15:47

## 2020-01-01 RX ADMIN — POTASSIUM CHLORIDE 10 MEQ: 10 INJECTION, SOLUTION INTRAVENOUS at 08:00

## 2020-01-01 RX ADMIN — PANTOPRAZOLE SODIUM 40 MG: 40 INJECTION, POWDER, FOR SOLUTION INTRAVENOUS at 08:39

## 2020-01-01 RX ADMIN — PETROLATUM: 42 OINTMENT TOPICAL at 21:00

## 2020-01-01 RX ADMIN — DONEPEZIL HYDROCHLORIDE 10 MG: 5 TABLET, FILM COATED ORAL at 23:56

## 2020-01-01 RX ADMIN — APIXABAN 5 MG: 5 TABLET, FILM COATED ORAL at 14:00

## 2020-01-01 RX ADMIN — METRONIDAZOLE 500 MG: 500 INJECTION, SOLUTION INTRAVENOUS at 10:06

## 2020-01-01 RX ADMIN — IPRATROPIUM BROMIDE AND ALBUTEROL SULFATE 1 AMPULE: 2.5; .5 SOLUTION RESPIRATORY (INHALATION) at 08:01

## 2020-01-01 RX ADMIN — POTASSIUM PHOSPHATE, MONOBASIC AND POTASSIUM PHOSPHATE, DIBASIC 15 MMOL: 224; 236 INJECTION, SOLUTION, CONCENTRATE INTRAVENOUS at 13:58

## 2020-01-01 RX ADMIN — PETROLATUM: 42 OINTMENT TOPICAL at 21:46

## 2020-01-01 RX ADMIN — PETROLATUM: 42 OINTMENT TOPICAL at 18:38

## 2020-01-01 RX ADMIN — POTASSIUM BICARBONATE 20 MEQ: 782 TABLET, EFFERVESCENT ORAL at 20:47

## 2020-01-01 RX ADMIN — PANTOPRAZOLE SODIUM 40 MG: 40 INJECTION, POWDER, FOR SOLUTION INTRAVENOUS at 08:27

## 2020-01-01 RX ADMIN — Medication 20 MG: at 10:34

## 2020-01-01 RX ADMIN — SODIUM CHLORIDE 1000 ML: 9 INJECTION, SOLUTION INTRAVENOUS at 14:52

## 2020-01-01 RX ADMIN — CEFTRIAXONE 2 G: 2 INJECTION, POWDER, FOR SOLUTION INTRAMUSCULAR; INTRAVENOUS at 12:18

## 2020-01-01 RX ADMIN — MAGNESIUM SULFATE HEPTAHYDRATE 2 G: 40 INJECTION, SOLUTION INTRAVENOUS at 05:45

## 2020-01-01 RX ADMIN — PROPOFOL 100 MCG/KG/MIN: 10 INJECTION, EMULSION INTRAVENOUS at 09:06

## 2020-01-01 RX ADMIN — Medication 10 ML: at 08:39

## 2020-01-01 RX ADMIN — TROSPIUM CHLORIDE 20 MG: 20 TABLET, FILM COATED ORAL at 21:00

## 2020-01-01 RX ADMIN — PETROLATUM: 42 OINTMENT TOPICAL at 14:00

## 2020-01-01 RX ADMIN — Medication 10 ML: at 16:22

## 2020-01-01 RX ADMIN — SODIUM CHLORIDE, PRESERVATIVE FREE 10 ML: 5 INJECTION INTRAVENOUS at 10:39

## 2020-01-01 RX ADMIN — CEFTRIAXONE 2 G: 2 INJECTION, POWDER, FOR SOLUTION INTRAMUSCULAR; INTRAVENOUS at 13:36

## 2020-01-01 RX ADMIN — LIDOCAINE HYDROCHLORIDE 80 MG: 20 INJECTION, SOLUTION INTRAVENOUS at 12:11

## 2020-01-01 RX ADMIN — Medication 10 ML: at 20:50

## 2020-01-01 RX ADMIN — PETROLATUM: 42 OINTMENT TOPICAL at 08:39

## 2020-01-01 RX ADMIN — IPRATROPIUM BROMIDE AND ALBUTEROL SULFATE 3 ML: 2.5; .5 SOLUTION RESPIRATORY (INHALATION) at 12:26

## 2020-01-01 RX ADMIN — IPRATROPIUM BROMIDE AND ALBUTEROL SULFATE 3 ML: 2.5; .5 SOLUTION RESPIRATORY (INHALATION) at 09:02

## 2020-01-01 RX ADMIN — CEFTRIAXONE 2 G: 2 INJECTION, POWDER, FOR SOLUTION INTRAMUSCULAR; INTRAVENOUS at 12:56

## 2020-01-01 RX ADMIN — PETROLATUM: 42 OINTMENT TOPICAL at 23:57

## 2020-01-01 RX ADMIN — Medication 10 ML: at 22:06

## 2020-01-01 RX ADMIN — METRONIDAZOLE 500 MG: 500 INJECTION, SOLUTION INTRAVENOUS at 16:01

## 2020-01-01 RX ADMIN — Medication 20 MG: at 21:45

## 2020-01-01 RX ADMIN — ASPIRIN 81 MG 81 MG: 81 TABLET ORAL at 14:00

## 2020-01-01 RX ADMIN — METRONIDAZOLE 500 MG: 500 INJECTION, SOLUTION INTRAVENOUS at 16:58

## 2020-01-01 RX ADMIN — APIXABAN 5 MG: 5 TABLET, FILM COATED ORAL at 10:05

## 2020-01-01 RX ADMIN — PANTOPRAZOLE SODIUM 40 MG: 40 INJECTION, POWDER, FOR SOLUTION INTRAVENOUS at 20:49

## 2020-01-01 RX ADMIN — TROSPIUM CHLORIDE 20 MG: 20 TABLET, FILM COATED ORAL at 21:33

## 2020-01-01 RX ADMIN — POTASSIUM BICARBONATE 20 MEQ: 782 TABLET, EFFERVESCENT ORAL at 09:17

## 2020-01-01 RX ADMIN — METRONIDAZOLE 500 MG: 500 INJECTION, SOLUTION INTRAVENOUS at 16:00

## 2020-01-01 RX ADMIN — IPRATROPIUM BROMIDE AND ALBUTEROL SULFATE 3 ML: 2.5; .5 SOLUTION RESPIRATORY (INHALATION) at 18:30

## 2020-01-01 RX ADMIN — METRONIDAZOLE 500 MG: 500 INJECTION, SOLUTION INTRAVENOUS at 02:10

## 2020-01-01 RX ADMIN — SODIUM CHLORIDE, PRESERVATIVE FREE 10 ML: 5 INJECTION INTRAVENOUS at 16:57

## 2020-01-01 RX ADMIN — SODIUM CHLORIDE: 9 INJECTION, SOLUTION INTRAVENOUS at 08:57

## 2020-01-01 RX ADMIN — IPRATROPIUM BROMIDE AND ALBUTEROL SULFATE 1 AMPULE: 2.5; .5 SOLUTION RESPIRATORY (INHALATION) at 14:06

## 2020-01-01 RX ADMIN — PANTOPRAZOLE SODIUM 80 MG: 40 INJECTION, POWDER, FOR SOLUTION INTRAVENOUS at 14:47

## 2020-01-01 RX ADMIN — Medication 10 ML: at 08:53

## 2020-01-01 RX ADMIN — DEXTROSE MONOHYDRATE, SODIUM CHLORIDE, AND POTASSIUM CHLORIDE: 50; 4.5; 1.49 INJECTION, SOLUTION INTRAVENOUS at 13:47

## 2020-01-01 RX ADMIN — SODIUM CHLORIDE, PRESERVATIVE FREE 10 ML: 5 INJECTION INTRAVENOUS at 16:56

## 2020-01-01 RX ADMIN — PETROLATUM: 42 OINTMENT TOPICAL at 09:19

## 2020-01-01 RX ADMIN — DONEPEZIL HYDROCHLORIDE 10 MG: 5 TABLET, FILM COATED ORAL at 20:46

## 2020-01-01 RX ADMIN — IPRATROPIUM BROMIDE AND ALBUTEROL SULFATE 1 AMPULE: 2.5; .5 SOLUTION RESPIRATORY (INHALATION) at 13:12

## 2020-01-01 RX ADMIN — PANTOPRAZOLE SODIUM 40 MG: 40 INJECTION, POWDER, FOR SOLUTION INTRAVENOUS at 22:06

## 2020-01-01 RX ADMIN — MEMANTINE HYDROCHLORIDE 10 MG: 10 TABLET, FILM COATED ORAL at 20:53

## 2020-01-01 RX ADMIN — PETROLATUM: 42 OINTMENT TOPICAL at 20:54

## 2020-01-01 RX ADMIN — PETROLATUM: 42 OINTMENT TOPICAL at 09:27

## 2020-01-01 RX ADMIN — PANTOPRAZOLE SODIUM 40 MG: 40 INJECTION, POWDER, FOR SOLUTION INTRAVENOUS at 23:56

## 2020-01-01 RX ADMIN — Medication 10 ML: at 08:05

## 2020-01-01 RX ADMIN — DEXTROSE MONOHYDRATE, SODIUM CHLORIDE, AND POTASSIUM CHLORIDE: 50; 4.5; 1.49 INJECTION, SOLUTION INTRAVENOUS at 19:07

## 2020-01-01 RX ADMIN — ALBUMIN (HUMAN) 25 G: 0.25 INJECTION, SOLUTION INTRAVENOUS at 05:21

## 2020-01-01 RX ADMIN — PANTOPRAZOLE SODIUM 40 MG: 40 INJECTION, POWDER, FOR SOLUTION INTRAVENOUS at 09:22

## 2020-01-01 RX ADMIN — SODIUM CHLORIDE: 9 INJECTION, SOLUTION INTRAVENOUS at 12:01

## 2020-01-01 RX ADMIN — METRONIDAZOLE 500 MG: 500 INJECTION, SOLUTION INTRAVENOUS at 16:40

## 2020-01-01 RX ADMIN — SODIUM CHLORIDE 250 ML: 9 INJECTION, SOLUTION INTRAVENOUS at 01:33

## 2020-01-01 RX ADMIN — Medication 10 ML: at 21:31

## 2020-01-01 RX ADMIN — ALBUMIN (HUMAN) 25 G: 0.25 INJECTION, SOLUTION INTRAVENOUS at 12:56

## 2020-01-01 RX ADMIN — IPRATROPIUM BROMIDE AND ALBUTEROL SULFATE 3 ML: 2.5; .5 SOLUTION RESPIRATORY (INHALATION) at 08:05

## 2020-01-01 RX ADMIN — PROPOFOL 100 MG: 10 INJECTION, EMULSION INTRAVENOUS at 12:11

## 2020-01-01 RX ADMIN — POTASSIUM & SODIUM PHOSPHATES POWDER PACK 280-160-250 MG 500 MG: 280-160-250 PACK at 14:15

## 2020-01-01 RX ADMIN — IPRATROPIUM BROMIDE AND ALBUTEROL SULFATE 1 AMPULE: 2.5; .5 SOLUTION RESPIRATORY (INHALATION) at 20:41

## 2020-01-01 RX ADMIN — POTASSIUM & SODIUM PHOSPHATES POWDER PACK 280-160-250 MG 500 MG: 280-160-250 PACK at 09:18

## 2020-01-01 RX ADMIN — IPRATROPIUM BROMIDE AND ALBUTEROL SULFATE 3 ML: 2.5; .5 SOLUTION RESPIRATORY (INHALATION) at 07:43

## 2020-01-01 RX ADMIN — IPRATROPIUM BROMIDE AND ALBUTEROL SULFATE 3 ML: 2.5; .5 SOLUTION RESPIRATORY (INHALATION) at 08:56

## 2020-01-01 RX ADMIN — PETROLATUM: 42 OINTMENT TOPICAL at 08:52

## 2020-01-01 RX ADMIN — MEMANTINE HYDROCHLORIDE 10 MG: 10 TABLET, FILM COATED ORAL at 10:06

## 2020-01-01 RX ADMIN — ASPIRIN 81 MG 81 MG: 81 TABLET ORAL at 10:39

## 2020-01-01 RX ADMIN — PETROLATUM: 42 OINTMENT TOPICAL at 20:35

## 2020-01-01 RX ADMIN — METRONIDAZOLE 500 MG: 500 INJECTION, SOLUTION INTRAVENOUS at 23:30

## 2020-01-01 RX ADMIN — PETROLATUM: 42 OINTMENT TOPICAL at 08:58

## 2020-01-01 RX ADMIN — IPRATROPIUM BROMIDE AND ALBUTEROL SULFATE 3 ML: 2.5; .5 SOLUTION RESPIRATORY (INHALATION) at 20:48

## 2020-01-01 RX ADMIN — POTASSIUM & SODIUM PHOSPHATES POWDER PACK 280-160-250 MG 250 MG: 280-160-250 PACK at 15:10

## 2020-01-01 RX ADMIN — ROCURONIUM BROMIDE 10 MG: 10 INJECTION, SOLUTION INTRAVENOUS at 12:38

## 2020-01-01 RX ADMIN — SODIUM CHLORIDE, PRESERVATIVE FREE 10 ML: 5 INJECTION INTRAVENOUS at 16:45

## 2020-01-01 RX ADMIN — PHENYLEPHRINE HYDROCHLORIDE 100 MCG: 10 INJECTION INTRAVENOUS at 12:37

## 2020-01-01 RX ADMIN — ASPIRIN 300 MG: 300 SUPPOSITORY RECTAL at 08:46

## 2020-01-01 RX ADMIN — MEMANTINE HYDROCHLORIDE 10 MG: 10 TABLET, FILM COATED ORAL at 20:34

## 2020-01-01 RX ADMIN — Medication 10 ML: at 10:06

## 2020-01-01 RX ADMIN — METRONIDAZOLE 500 MG: 500 INJECTION, SOLUTION INTRAVENOUS at 00:02

## 2020-01-01 RX ADMIN — TROSPIUM CHLORIDE 20 MG: 20 TABLET, FILM COATED ORAL at 20:47

## 2020-01-01 RX ADMIN — POTASSIUM CHLORIDE 10 MEQ: 10 INJECTION, SOLUTION INTRAVENOUS at 10:14

## 2020-01-01 RX ADMIN — DEXTROSE MONOHYDRATE, SODIUM CHLORIDE, AND POTASSIUM CHLORIDE: 50; 4.5; 1.49 INJECTION, SOLUTION INTRAVENOUS at 05:34

## 2020-01-01 RX ADMIN — SODIUM CHLORIDE, PRESERVATIVE FREE 10 ML: 5 INJECTION INTRAVENOUS at 16:40

## 2020-01-01 RX ADMIN — IPRATROPIUM BROMIDE AND ALBUTEROL SULFATE 1 AMPULE: 2.5; .5 SOLUTION RESPIRATORY (INHALATION) at 21:43

## 2020-01-01 RX ADMIN — IOPAMIDOL 90 ML: 755 INJECTION, SOLUTION INTRAVENOUS at 16:23

## 2020-01-01 RX ADMIN — PANTOPRAZOLE SODIUM 40 MG: 40 INJECTION, POWDER, FOR SOLUTION INTRAVENOUS at 08:04

## 2020-01-01 RX ADMIN — METRONIDAZOLE 500 MG: 500 INJECTION, SOLUTION INTRAVENOUS at 15:54

## 2020-01-01 RX ADMIN — PETROLATUM: 42 OINTMENT TOPICAL at 10:07

## 2020-01-01 RX ADMIN — METRONIDAZOLE 500 MG: 500 INJECTION, SOLUTION INTRAVENOUS at 07:54

## 2020-01-01 RX ADMIN — IPRATROPIUM BROMIDE AND ALBUTEROL SULFATE 3 ML: 2.5; .5 SOLUTION RESPIRATORY (INHALATION) at 17:18

## 2020-01-01 RX ADMIN — Medication 10 ML: at 08:52

## 2020-01-01 RX ADMIN — ALBUMIN (HUMAN) 25 G: 0.25 INJECTION, SOLUTION INTRAVENOUS at 12:39

## 2020-01-01 RX ADMIN — METRONIDAZOLE 500 MG: 500 INJECTION, SOLUTION INTRAVENOUS at 16:52

## 2020-01-01 RX ADMIN — PETROLATUM: 42 OINTMENT TOPICAL at 20:50

## 2020-01-01 RX ADMIN — Medication 10 ML: at 09:21

## 2020-01-01 RX ADMIN — APIXABAN 5 MG: 5 TABLET, FILM COATED ORAL at 21:33

## 2020-01-01 RX ADMIN — PANTOPRAZOLE SODIUM 40 MG: 40 TABLET, DELAYED RELEASE ORAL at 07:06

## 2020-01-01 RX ADMIN — SODIUM CHLORIDE, PRESERVATIVE FREE 10 ML: 5 INJECTION INTRAVENOUS at 13:00

## 2020-01-01 RX ADMIN — CALCIUM GLUCONATE 1 G: 98 INJECTION, SOLUTION INTRAVENOUS at 06:49

## 2020-01-01 RX ADMIN — SODIUM CHLORIDE, PRESERVATIVE FREE 10 ML: 5 INJECTION INTRAVENOUS at 09:47

## 2020-01-01 RX ADMIN — SODIUM CHLORIDE 250 ML: 9 INJECTION, SOLUTION INTRAVENOUS at 02:15

## 2020-01-01 ASSESSMENT — PAIN SCALES - PAIN ASSESSMENT IN ADVANCED DEMENTIA (PAINAD)
CONSOLABILITY: 0
BODYLANGUAGE: 0
FACIALEXPRESSION: 0
FACIALEXPRESSION: 0
NEGVOCALIZATION: 0
BREATHING: 0
FACIALEXPRESSION: 0
BODYLANGUAGE: 0
NEGVOCALIZATION: 0
CONSOLABILITY: 0
BODYLANGUAGE: 0
NEGVOCALIZATION: 0
CONSOLABILITY: 0
FACIALEXPRESSION: 0
NEGVOCALIZATION: 0
BREATHING: 0
BREATHING: 0
CONSOLABILITY: 0
FACIALEXPRESSION: 0
TOTALSCORE: 0
BODYLANGUAGE: 0
TOTALSCORE: 0
CONSOLABILITY: 0
FACIALEXPRESSION: 0
FACIALEXPRESSION: 0
TOTALSCORE: 0
NEGVOCALIZATION: 0
BODYLANGUAGE: 0
CONSOLABILITY: 0
BODYLANGUAGE: 0
FACIALEXPRESSION: 0
TOTALSCORE: 0
BODYLANGUAGE: 0
FACIALEXPRESSION: 0
TOTALSCORE: 0
TOTALSCORE: 0
NEGVOCALIZATION: 0
NEGVOCALIZATION: 0
TOTALSCORE: 0
NEGVOCALIZATION: 0
BODYLANGUAGE: 0
BREATHING: 0
BODYLANGUAGE: 0
BREATHING: 0
TOTALSCORE: 0
TOTALSCORE: 0
NEGVOCALIZATION: 0
CONSOLABILITY: 0

## 2020-01-01 ASSESSMENT — PULMONARY FUNCTION TESTS
PIF_VALUE: 23
PIF_VALUE: 0
PIF_VALUE: 16
PIF_VALUE: 0
PIF_VALUE: 15
PIF_VALUE: 21
PIF_VALUE: 1
PIF_VALUE: 22
PIF_VALUE: 18
PIF_VALUE: 21
PIF_VALUE: 2
PIF_VALUE: 3
PIF_VALUE: 0
PIF_VALUE: 20
PIF_VALUE: 31
PIF_VALUE: 18
PIF_VALUE: 0
PIF_VALUE: 19
PIF_VALUE: 28
PIF_VALUE: 36
PIF_VALUE: 17
PIF_VALUE: 17
PIF_VALUE: 0
PIF_VALUE: 0
PIF_VALUE: 19
PIF_VALUE: 0
PIF_VALUE: 26
PIF_VALUE: 21
PIF_VALUE: 0
PIF_VALUE: 18
PIF_VALUE: 5
PIF_VALUE: 19
PIF_VALUE: 0
PIF_VALUE: 1
PIF_VALUE: 0
PIF_VALUE: 17
PIF_VALUE: 8
PIF_VALUE: 0
PIF_VALUE: 3
PIF_VALUE: 0
PIF_VALUE: 37
PIF_VALUE: 18
PIF_VALUE: 29
PIF_VALUE: 0
PIF_VALUE: 19
PIF_VALUE: 21
PIF_VALUE: 0
PIF_VALUE: 1
PIF_VALUE: 24
PIF_VALUE: 19
PIF_VALUE: 0
PIF_VALUE: 0
PIF_VALUE: 30
PIF_VALUE: 0
PIF_VALUE: 22
PIF_VALUE: 17
PIF_VALUE: 0
PIF_VALUE: 0
PIF_VALUE: 18
PIF_VALUE: 20
PIF_VALUE: 23
PIF_VALUE: 20
PIF_VALUE: 0
PIF_VALUE: 0
PIF_VALUE: 16
PIF_VALUE: 18
PIF_VALUE: 0
PIF_VALUE: 0
PIF_VALUE: 31
PIF_VALUE: 31
PIF_VALUE: 0
PIF_VALUE: 16
PIF_VALUE: 2
PIF_VALUE: 1
PIF_VALUE: 6
PIF_VALUE: 29
PIF_VALUE: 0
PIF_VALUE: 21
PIF_VALUE: 20
PIF_VALUE: 0
PIF_VALUE: 26
PIF_VALUE: 17
PIF_VALUE: 4
PIF_VALUE: 21
PIF_VALUE: 0

## 2020-01-01 ASSESSMENT — PAIN SCALES - GENERAL
PAINLEVEL_OUTOF10: 0
PAINLEVEL_OUTOF10: 4
PAINLEVEL_OUTOF10: 0
PAINLEVEL_OUTOF10: 8
PAINLEVEL_OUTOF10: 0
PAINLEVEL_OUTOF10: 8
PAINLEVEL_OUTOF10: 0
PAINLEVEL_OUTOF10: 8
PAINLEVEL_OUTOF10: 0

## 2020-01-01 ASSESSMENT — PAIN DESCRIPTION - DESCRIPTORS
DESCRIPTORS: ACHING;CONSTANT
DESCRIPTORS: ACHING;CONSTANT

## 2020-01-01 ASSESSMENT — PAIN DESCRIPTION - FREQUENCY
FREQUENCY: CONTINUOUS
FREQUENCY: CONTINUOUS

## 2020-01-01 ASSESSMENT — ENCOUNTER SYMPTOMS
SHORTNESS OF BREATH: 0
CHEST TIGHTNESS: 0
DIARRHEA: 1
ALLERGIC/IMMUNOLOGIC NEGATIVE: 1
BLOOD IN STOOL: 1
NAUSEA: 0
CONSTIPATION: 0
VOMITING: 0
ABDOMINAL PAIN: 1
COUGH: 0

## 2020-01-01 ASSESSMENT — PAIN DESCRIPTION - ONSET
ONSET: ON-GOING
ONSET: ON-GOING

## 2020-01-01 ASSESSMENT — PAIN DESCRIPTION - LOCATION
LOCATION: KNEE
LOCATION: BACK

## 2020-01-01 ASSESSMENT — PAIN DESCRIPTION - PAIN TYPE
TYPE: ACUTE PAIN
TYPE: CHRONIC PAIN

## 2020-01-01 ASSESSMENT — PAIN DESCRIPTION - ORIENTATION
ORIENTATION: LEFT;RIGHT
ORIENTATION: MID

## 2020-01-01 ASSESSMENT — PAIN DESCRIPTION - PROGRESSION: CLINICAL_PROGRESSION: NOT CHANGED

## 2020-01-01 NOTE — PROGRESS NOTES
Department of Internal Medicine  Nephrology Attending Consult Note      SUBJECTIVE: We are following Mr. Sands Crew for hypernatremia and DINA. Reports no complaints.        PHYSICAL EXAM:      Vitals:    VITALS:  /68   Pulse 71   Temp 98.6 °F (37 °C) (Temporal)   Resp 16   Ht 6' 3\" (1.905 m)   Wt 190 lb 8 oz (86.4 kg)   SpO2 100%   BMI 23.81 kg/m²   24HR INTAKE/OUTPUT:      Intake/Output Summary (Last 24 hours) at 1/1/2020 1123  Last data filed at 1/1/2020 0899  Gross per 24 hour   Intake 2199 ml   Output 1350 ml   Net 849 ml       Constitutional: Patient is lethargic, follow simple commands  HEENT: Pupils are equal reactive, mucous membranes are dry  Respiratory: Lungs are clear  Cardiovascular/Edema: Heart sounds are regular rate  Gastrointestinal: Abdomen soft nontender  Neurologic: Patient is lethargic  Skin: No lesions  Other: No edema    Scheduled Meds:   ceFAZolin  2 g Intravenous See Admin Instructions    pantoprazole  40 mg Intravenous Daily    And    sodium chloride (PF)  10 mL Intravenous Daily    aspirin  300 mg Rectal Daily    ipratropium-albuterol  1 ampule Inhalation Q4H WA     Continuous Infusions:   dextrose 5% and 0.9% NaCl with KCl 20 mEq 75 mL/hr at 01/01/20 0345    heparin (porcine) 9 Units/kg/hr (01/01/20 0801)     PRN Meds:.sodium chloride flush, acetaminophen, HYDROmorphone, heparin (porcine), heparin (porcine)    DATA:    CBC:   Lab Results   Component Value Date    WBC 5.1 01/01/2020    RBC 2.77 01/01/2020    HGB 7.9 01/01/2020    HCT 24.7 01/01/2020    MCV 89.2 01/01/2020    MCH 28.5 01/01/2020    MCHC 32.0 01/01/2020    RDW 14.6 01/01/2020     01/01/2020    MPV 10.3 01/01/2020     CMP:    Lab Results   Component Value Date     01/01/2020    K 4.2 01/01/2020    K 4.1 08/30/2019     01/01/2020    CO2 22 01/01/2020    BUN 3 01/01/2020    CREATININE 0.8 01/01/2020    GFRAA >60 01/01/2020    LABGLOM >60 01/01/2020    GLUCOSE 104 01/01/2020    GLUCOSE 84 04/25/2012    PROT 5.0 01/01/2020    LABALBU 2.4 01/01/2020    LABALBU 4.1 04/25/2012    CALCIUM 8.2 01/01/2020    BILITOT 0.4 01/01/2020    ALKPHOS 69 01/01/2020    AST 21 01/01/2020    ALT 13 01/01/2020     Magnesium:    Lab Results   Component Value Date    MG 1.8 01/01/2020     Phosphorus:    Lab Results   Component Value Date    PHOS 2.4 01/01/2020     Vitamin D 25: 31  Folate: 13.6  10 B12: 4079      Radiology Review:      CT abdomen and pelvis without contrast December 25, 2019   1. Generalized anasarca which makes difficult to evaluate the omental   mesenteric fat planes in the abdomen may in the perirectal spaces are.       2. Findings for fecal rectal retention/impaction.       4. No obstructive uropathy.       5. No dilatation of the biliary tree or biliary ductal system       5. Confluent infiltrate with some consolidations of patch distribution   in the right lower lobe. Pneumonia or aspiration considered. Chest x-ray December 25, 2019   No acute cardiac pulmonary process. BRIEF SUMMARY OF INITIAL CONSULT:    Briefly Mr. 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 His medications prior to admission included Lasix 20 mg p.o. twice daily. Problems resolved:    · DINA stage I, volume responsive prerenal DINA, resolved  · Hypokalemia, multifactorial due to diuretics, poor oral intake. · Fecal impaction per CT   · Hypernatremia, multifactorial, secondary to diarrhea and decreased oral intake along with diuretics. Resolved. IMPRESSION/RECOMMENDATIONS:      1. Hypophosphatemia, due to poor oral intake   2. GPC bacteremia 2/4, on linezolid  3.  History of DVT and PE, status post IVC filter placement, on apixaban  4. Normocytic anemia  5.  Nutrition, n.p.o.    Plan:    · Continue IV fluids to D5NS + KCL 20 meq L at 60cc/hour while npo   · Replace phosphorus  · For PEG

## 2020-01-01 NOTE — PROGRESS NOTES
Obtained telephone consent from [facility] for PEG insetion scheduled for 1/2/20. Jose R Ivey RN double verification.

## 2020-01-01 NOTE — CONSULTS
GENERAL SURGERY  CONSULT NOTE  12/31/2019    Physician Consulted: Dr. Jhony Laughlin   Reason for Consult: PEG  Referring Physician: Dr. Marshall CAMPOS  Cliff Lu is a 80 y.o. male who presents for evaluation of PEG. Patient is confused and as such hx obtained from EMR. Patient was admitted on 12/25/2019 for diarrhea and dehydration. Patient has a history of alzheimer's and had not been taking much PO. His hypernatremia has been improving however he failed a swallow evaluation today. In discissions with his son, he has had some swallowing issues but never this severe. He has a history of left inguinal hernia repair and an IVCF for history of DVT/PE. He is on a heparin gtt currently. No history of foregut surgery and CT A/P on 12/25/2019 did not demonstrate any appreciable ascites. He failed an MBS on 12/31. Past Medical History:   Diagnosis Date    Alzheimer disease ([address]     DVT (deep venous thrombosis) (HCC)     History of pulmonary embolism     WITH RIGHT VENTRICULAR INFARCTION    Macular degeneration     Metabolic syndrome     Presence of IVC filter     Pulmonary emboli [facility])        Past Surgical History:   Procedure Laterality Date    COLONOSCOPY  07/15/2003    HERNIA REPAIR      OTHER SURGICAL HISTORY  02/19/2019    Dr. Shelly Mckeon- IVC Filter    POLYPECTOMY      X 2       Medications Prior to Admission:    Prior to Admission medications    Medication Sig Start Date End Date Taking?  Authorizing Provider   traMADol (ULTRAM) 50 MG tablet TAKE 1 TABLET BY MOUTH EVERY 6 HOURS AS NEEDED FOR PAIN 9/27/19   Historical Provider, MD   apixaban (ELIQUIS) 5 MG TABS tablet Take 1 tablet by mouth 2 times daily 11/4/19   Brando Tamez,    donepezil (ARICEPT) 10 MG tablet Take 1 tablet by mouth nightly 11/4/19   Brando Quintanilla,    pantoprazole (PROTONIX) 40 MG tablet Take 1 tablet by mouth every morning (before breakfast) 11/4/19   Brando Quintanilla,    trospium (SANCTURA) 20 MG tablet Take 20 mg by mouth nightly    Historical Provider, MD   Cholecalciferol (VITAMIN D3) 2000 units CAPS Take 1 capsule by mouth daily    Historical Provider, MD   trospium (SANCTURA) 20 MG tablet Take 20 mg by mouth 2 times daily    Historical Provider, MD   docusate sodium (COLACE) 100 MG capsule Take 100 mg by mouth 2 times daily    Historical Provider, MD   potassium chloride (KLOR-CON M) 20 MEQ extended release tablet Take 20 mEq by mouth 2 times daily    Historical Provider, MD   furosemide (LASIX) 20 MG tablet Take 20 mg by mouth 2 times daily    Historical Provider, MD   memantine (NAMENDA) 10 MG tablet Take 1 tablet by mouth 2 times daily 11/30/18   Brando Quintanilla DO   aspirin 81 MG tablet Take 81 mg by mouth daily    Historical Provider, MD       No Known Allergies    History reviewed. No pertinent family history. Social History     Tobacco Use    Smoking status: Never Smoker    Smokeless tobacco: Never Used   Substance Use Topics    Alcohol use: Not Currently     Comment: occasional    Drug use: Never         Review of Systems   General ROS: negative  Hematological and Lymphatic ROS: negative  Respiratory ROS: no cough, shortness of breath, or wheezing  Cardiovascular ROS: no chest pain or dyspnea on exertion  Gastrointestinal ROS: no abdominal pain, change in bowel habits, or black or bloody stools  Genito-Urinary ROS: no dysuria, trouble voiding, or hematuria  Musculoskeletal ROS: negative      PHYSICAL EXAM:    Vitals:    12/31/19 1500   BP: (!) 143/75   Pulse: 83   Resp: 16   Temp: 97.8 °F (36.6 °C)   SpO2: 100%       General Appearance:  awake, alert in no acute distress  Skin:  Skin color, texture, turgor normal. No rashes or lesions. Head/face:  NCAT  Eyes:  No gross abnormalities. Lungs:  No increased work of breathing on RA  Heart:  RR  Abdomen:  Soft, NTND  Extremities: Extremities warm to touch, pink, with no edema.     LABS:    CBC  Recent Labs     12/31/19  0510   WBC 4.8   HGB 7.7*   HCT 24.4*      BMP  Recent Labs     19  0510      K 3.7      CO2 22   BUN 5*   CREATININE 0.8   CALCIUM 8.1*     Liver Function  Recent Labs     19  0510   BILITOT 0.3   AST 20   ALT 14   ALKPHOS 63   PROT 5.2*   LABALBU 2.4*     No results for input(s): LACTATE in the last 72 hours. No results for input(s): INR, PTT in the last 72 hours. Invalid input(s): PT    RADIOLOGY  Ct Abdomen Pelvis Wo Contrast Additional Contrast? None    Result Date: 2019  Patient MRN:  [id number] : 1932 Age: 80 years Gender: Male Order Date:  2019 6:30 PM TECHNIQUE/NUMBER OF IMAGES/COMPARISON/CLINICAL HISTORY: CT abdomen pelvis no contrast Axial images were obtained sagittal and coronal reconstructions. 56 images 69year-old male patient with a history of abdominal pain. Patient previous hernia repair. Comparison is made with study of 2018. FINDINGS: There are artifacts as the patient could not move away the upper extends from the areas of interest particular upper abdomen. There are normal size and this for the liver and spleen. Gallbladder is nondistended. The biliary tree is not dilated. The pancreas demonstrates atrophy. There is no indication for dilatation of the pancreatic ductal system. Adrenals not enlarged. There is a simple cyst in the left kidney which was seen previously. No obstructive uropathy is seen. Calcified atheroma changes are seen in the abdominal aorta with fusiform dilatation up to 2.4 cm there are. The patient is an IVC filter. There are signs for fecal rectal retention/impaction. Some thickening of the wall of the rectal is seen which can represent a component of mild proctitis to be correlated clinically. There is generalized anasarca particularly towards the lower pelvic region and upper thighs are. There is also edema in the perirectal space which infarct can be to the anasarca. There is a Valdovinos catheter in the bladder. The bladder is not distended. There is some thickening of the bladder wall. The prostate gland appears enlarged, impresses in the left base the base of the bladder. The prostate measures 5.4 x 3.1 x 5.2 cm. Seminal vesicles appear symmetrical size. There are also evidence for edema or anasarca in the omental mesenteric fat planes which makes more difficult to evaluate the small localized inflammatory process. Cannot see conspicuously the appendix on this study. There is difficult to evaluate for pericolonic spaces but no specific pericolonic inflammatory reaction is observed the. There is no free intraperitoneal air. There are also air distention is of other segments of the colon is seen for the right-sided colon and part of the transverse colon. There is also air and some fluid distention of the stomach. Lower lung bases demonstrated the a patchy infiltrate in the right lower lobe. This can represent pneumonia with a component of consolidation or manifestation of aspiration. Bone structures demonstrate osteopenia and degenerative changes throughout most of the disc spaces of the lumbar spine. There are long-standing deformity and reshaping of articular surface of the left hip joint with some degree of protrusion acetabulum. 1. Generalized anasarca which makes difficult to evaluate the omental mesenteric fat planes in the abdomen may in the perirectal spaces are. 2. Findings for fecal rectal retention/impaction. 4. No obstructive uropathy. 5. No dilatation of the biliary tree or biliary ductal system 5. Confluent infiltrate with some consolidations of patch distribution in the right lower lobe. Pneumonia or aspiration considered. Xr Chest Portable    Result Date: 2019  Patient MRN:  [id number] : 1932 Age: 80 years Gender: Male Order Date:  2019 5:15 PM TECHNIQUE/NUMBER OF IMAGES/COMPARISON/CLINICAL HISTORY: Chest AP 1 image one view Comparison . Clinical history difficult breathing.  FINDINGS: Lungs are normally expanded. No infiltrates, consolidation or pleural perfusion seen. Heart has normal size, mediastinum unremarkable. There is no perihilar vascular congestion. No acute cardiac pulmonary process. Fluoroscopy Modified Barium Swallow With Video    Result Date: 2019  Patient MRN: [id number] : 1932 Age:  80 years Gender: Male Order Date: 2019 1:00 PM Exam: [id number] 75 Number of Images: 3 Indication:   aspiration Comparison: None. Fluoroscopy time: 2.9 minutes Findings: Textures given, thin, nectar, honey, pudding Aspiration, thin, nectar, honey, pudding Laryngeal penetration, none Cough, None Oral delay, Yes Retention in vallecula, Yes Retention in piriform sinuses, Yes Pharyngeal delay, Yes Laryngeal elevation, reduced     Study is remarkable for aspiration with all consistencies offered. There is oral and pharyngeal delay, and retention in the vallecula and piriform sinuses. This procedure was performed and dictated by Johanna Tamayo PA-C with indirect supervision, and Tanya Rabago. Nick WALLIS reviewed and concurred with the findings. ASSESSMENT:  80 y.o. male with dysphagia and failure to thrive with need for PEG    PLAN:  Discussed PEG with OCHOACELESTE Christian Castro ([phone]) and he is agreeable to a PEG for his father. R/B/A were discussed. Plan for PEG 2020, time TBD. Will need heparin gtt held 4-6 hours prior to procedure.    D/w Dr. Kyle Hutchinson     Electronically signed by Saranya Siddiqi MD on 19 at 8:05 PM

## 2020-01-02 PROBLEM — E43 SEVERE PROTEIN-CALORIE MALNUTRITION (HCC): Status: ACTIVE | Noted: 2018-06-15

## 2020-01-02 NOTE — PROGRESS NOTES
Department of Internal Medicine  Nephrology Attending Consult Note    Events reviewed. Patient had PEG earlier today. SUBJECTIVE: We are following Mr. Ciara Olivo for hypernatremia and DINA. Reports no complaints.        PHYSICAL EXAM:      Vitals:    VITALS:  BP (!) 121/58   Pulse 64   Temp 97.3 °F (36.3 °C) (Temporal)   Resp 16   Ht 6' 3\" (1.905 m)   Wt 190 lb 8 oz (86.4 kg)   SpO2 99%   BMI 23.81 kg/m²   24HR INTAKE/OUTPUT:      Intake/Output Summary (Last 24 hours) at 1/2/2020 1032  Last data filed at 1/2/2020 0830  Gross per 24 hour   Intake 1909 ml   Output 1375 ml   Net 534 ml       Constitutional: Patient is lethargic, follow simple commands  HEENT: Pupils are equal reactive, mucous membranes are dry  Respiratory: Lungs are clear  Cardiovascular/Edema: Heart sounds are regular rate  Gastrointestinal: Abdomen soft nontender, PEG tube in place  Neurologic: Patient is lethargic  Skin: No lesions  Other: No edema    Scheduled Meds:   ceFAZolin  2 g Intravenous See Admin Instructions    pantoprazole  40 mg Intravenous Daily    And    sodium chloride (PF)  10 mL Intravenous Daily    aspirin  300 mg Rectal Daily    ipratropium-albuterol  1 ampule Inhalation Q4H WA     Continuous Infusions:   dextrose 5% and 0.9% NaCl with KCl 20 mEq Stopped (01/02/20 0707)    heparin (porcine) Stopped (01/02/20 0256)     PRN Meds:.sodium chloride flush, acetaminophen, HYDROmorphone, heparin (porcine), heparin (porcine)    DATA:    CBC:   Lab Results   Component Value Date    WBC 4.8 01/02/2020    RBC 2.73 01/02/2020    HGB 7.8 01/02/2020    HCT 24.3 01/02/2020    MCV 89.0 01/02/2020    MCH 28.6 01/02/2020    MCHC 32.1 01/02/2020    RDW 14.9 01/02/2020     01/02/2020    MPV 9.7 01/02/2020     CMP:    Lab Results   Component Value Date     01/02/2020    K 2.6 01/02/2020    K 4.1 08/30/2019     01/02/2020    CO2 16 01/02/2020    BUN 3 01/02/2020    CREATININE 0.5 01/02/2020    GFRAA >60 01/02/2020 LABGLOM >60 01/02/2020    GLUCOSE 67 01/02/2020    GLUCOSE 84 04/25/2012    PROT 3.7 01/02/2020    LABALBU 1.8 01/02/2020    LABALBU 4.1 04/25/2012    CALCIUM 5.6 01/02/2020    BILITOT 0.3 01/02/2020    ALKPHOS 46 01/02/2020    AST 14 01/02/2020    ALT 8 01/02/2020     Magnesium:    Lab Results   Component Value Date    MG 1.2 01/02/2020     Phosphorus:    Lab Results   Component Value Date    PHOS 2.0 01/02/2020     Vitamin D 25: 31  Folate: 13.6  10 B12: 7745      Radiology Review:      CT abdomen and pelvis without contrast December 25, 2019   1. Generalized anasarca which makes difficult to evaluate the omental   mesenteric fat planes in the abdomen may in the perirectal spaces are.       2. Findings for fecal rectal retention/impaction.       4. No obstructive uropathy.       5. No dilatation of the biliary tree or biliary ductal system       5. Confluent infiltrate with some consolidations of patch distribution   in the right lower lobe. Pneumonia or aspiration considered. Chest x-ray December 25, 2019   No acute cardiac pulmonary process. BRIEF SUMMARY OF INITIAL CONSULT:    Briefly Mr. Kirsten Velasco is a 42-year-old man with history of Alzheimer's disease, DVT and PE status post IVC filter placement, EF 55-60% with stage I DD, macular degeneration, who was admitted on December 25, 2019 after he was brought to the ER with multiple episode of diarrhea and poor oral intake, after evaluation he was found to have a sodium 151 mEq/L reason for this consultation. His medications prior to admission included Lasix 20 mg p.o. twice daily. Problems resolved:    · DINA stage I, volume responsive prerenal DINA, resolved  · Hypokalemia, multifactorial due to diuretics, poor oral intake. · Fecal impaction per CT   · Hypernatremia, multifactorial, secondary to diarrhea and decreased oral intake along with diuretics. Resolved. IMPRESSION/RECOMMENDATIONS:      1.  Hypokalemia, secondary to no oral intake  2. Hypophosphatemia, due to poor oral intake   3. Hypomagnesemia secondary to poor oral intake  4. Low bicarbonate levels with hyperchloremia, consistent with hyperchloremic metabolic acidosis versus respiratory alkalosis. 5. GPC bacteremia 2/4, on linezolid  6. History of DVT and PE, status post IVC filter placement, on apixaban  7. Normocytic anemia  8. Severe hypoalbuminemia  9.  Nutrition, n.p.o., s/p PEG     Plan:    · Continue IV fluids to D5NS + KCL 20 meq L at 60cc/hour while npo   · Albumin 25 g IV every 8 hours x6  · Replace potassium  · Replace magnesium  · Replace phosphorus  · Obtain ABGs

## 2020-01-02 NOTE — CARE COORDINATION
1/2/2020  Discharge plan remains for patient to transfer to [place] [id number] in [facility] once stable. Insurance precert will be needed. SW will follow and assist with transiiton of care.

## 2020-01-02 NOTE — PROGRESS NOTES
Nutrition Assessment    Type and Reason for Visit: Reassess    Nutrition Recommendations:Initiate TF w/ close monitoring of electrolytes     Pt now at high risk for refeeding syndrome, if plan for EN, recommend initial goal of 1/2 rate w/ slow progression to end goal and close monitoring of electrolytes - pt already w/ hypokalemia/hypophosphatemia     Recommend initial goal of 1.5 Calorie w/ Fiber @ 30 ml/hr to provide 720 ml tv, 1080 kcal, 46 gm pro, 547 ml free water     End goal of 1.5 Calorie w/ Fiber @ 55 ml/hr + 1 protein modular daily to provide 1320 ml tv, 1980 kcal, 84 gm pro, 1003 ml free water (2084 kcal, 110 gm pro w/ daily pro mod)  When no IVF, 175 ml H2O flush q4 hr to provide 2053 ml total water    Nutrition Assessment: Pt continues to decline from a nutritional standpoint, now s/p PEG placement w/ no nutrition x >1 week now meeting criteria for severe malnutrition at high risk for refeeding syndrome now w/ electrolyte imbalances. Pt w/ multiple open areas. Will provide updated recs and monitor    Malnutrition Assessment:  · Malnutrition Status: Meets the criteria for severe malnutrition  · Context: Acute illness or injury  · Findings of the 6 clinical characteristics of malnutrition (Minimum of 2 out of 6 clinical characteristics is required to make the diagnosis of moderate or severe Protein Calorie Malnutrition based on AND/ASPEN Guidelines):  1. Energy Intake-Less than or equal to 50% of estimated energy requirement, Greater than or equal to 7 days    2. Weight Loss-10% loss or greater, (in 4 months)  3. Fat Loss-Moderate subcutaneous fat loss, Orbital, Triceps  4. Muscle Loss-Moderate muscle mass loss, Temples (temporalis muscle), Clavicles (pectoralis and deltoids), Thigh (quadriceps), Calf (gastrocnemius)  5. Fluid Accumulation-Severe fluid accumulation, Genitals, Extremities  6.   Strength-Not measured    Nutrition Risk Level: High    Nutrition Needs:  · Estimated Daily Total Kcal: (MSJ 1562 x 1.3 SF)  · Estimated Daily Protein (g): 105-115(1.3-1.5 gm/kg CBW)  · Estimated Daily Fluid (ml/day):     Nutrition Diagnosis:   · Problem: Severe malnutrition, In context of acute illness or injury  · Etiology: related to Difficulty swallowing     Signs and symptoms:  as evidenced by NPO status due to medical condition, Diet history of poor intake, Weight loss greater than or equal to 10% in 6 months, Moderate loss of subcutaneous fat, Moderate muscle loss    Objective Information:  · Nutrition-Focused Physical Findings: pt disoriented w/ family at bedside, soft abd, hypoactive BS, +4 pitting edema, +I/Os (>10L), hypokalemia (2.6), hypophosphatemia (2.0)  · Wound Type: Multiple, Open Wounds, Wound Consult Pending  · Current Nutrition Therapies:  · Oral Diet Orders: NPO   · Tube Feeding (TF) Orders:   · Feeding Route: Gastrostomy  · Formula: Standard w/Fiber(not yet started)  · Rate (ml/hr):45 ml/hr    · Volume (ml/day): 1080 ml tv  · Duration: Continuous  · Goal TF & Flush Orders Provides: 1296 kcal, 60 gm pro, 872 ml free water  · Anthropometric Measures:  · Ht: 6' 3\" (190.5 cm)   · Current Body Wt: 188 lb (85.3 kg)(bed scale 1/2)  · Admission Body Wt: 175 lb (79.4 kg)(bed scale 12/27)  · Usual Body Wt: 206 lb (93.4 kg)(actual per EMR 08/2019)  · Weight Change: CBW remains elevated w/ ongoing +fluid balance/+4 pitting edema   · Ideal Body Wt: 196 lb (88.9 kg), % Ideal Body 89%(using  adm wt)  · BMI Classification: BMI 18.5 - 24.9 Normal Weight    Nutrition Interventions:   Continued Inpatient Monitoring, Education Initiated, Coordination of Care(Reviewed TF w/ family)    Nutrition Evaluation:   · Evaluation: Progressing toward goals   · Goals: Nutrition progression   · Monitoring: Nutrition Progression, TF Intake, TF Tolerance, Skin Integrity, Wound Healing, I&O, Mental Status/Confusion, Weight, Chewing/Swallowing, Pertinent Labs, Monitor Bowel Function      Electronically signed by Sara Ureña, MS, RD, LD on 1/2/20 at 11:51 AM    Contact Number: [phone]

## 2020-01-02 NOTE — PROGRESS NOTES
Wound care: patient off unit for testing,consult unable to be completed at this time.  [facility]

## 2020-01-02 NOTE — OP NOTE
skin into the stomach. This was performed under direct visualization. Next the wire was placed through the needle using the seldinger technique. The snare was used to grasp the wire. The  EGDscope with wire was pulled out through the patient's mouth. The peg tube was then attached to the wire and pulled back into the stomach under direct visualization. The endocscope confirmed good placement of the peg tube. The peg tube was placed at 2.5 cm at the skin . The air was sucked out of the stomach. An abdominal binder was ordered and the peg tube was placed to gravity drainage. THE PATIENT TOLERATED THE PROCEDURE.     Findings:  PEG tube at 2.5cm at the skin    Plan:  Toan Winnluannedaquan to use PEG tube for meds  Start Tube feeds today, 1/2/2020      Juvenal Rodas MD, MSc, FACS  1/2/2020  8:14 AM

## 2020-01-02 NOTE — PLAN OF CARE
Problem: Malnutrition  (NI-5.2)  Goal: Food and/or Nutrient Delivery  Description: Initial goal of 1.5 Calorie w/ Fiber @ 30 ml/hr  Individualized approach for food/nutrient provision.   Outcome: Met This Shift

## 2020-01-02 NOTE — FLOWSHEET NOTE
0.6 cm^3   Wound Healing % 50   Wound Assessment Red   Drainage Amount Scant   Drainage Description Serosanguinous   Odor None   Madison-wound Assessment Other (Comment)  (pink epithealized areas)   Red%Wound Bed 100   Wound 12/25/19 Hip Left   Date First Assessed/Time First Assessed: 12/25/19 2045   Present on Hospital Admission: Yes  Location: Hip  Wound Location Orientation: Left   Wound Image    Wound Pressure Unstageable   Dressing/Treatment Pharmaceutical agent (see MAR)   Wound Length (cm) 0.5 cm   Wound Width (cm) 1 cm   Wound Depth (cm)   (undetermined)   Wound Surface Area (cm^2) 0.5 cm^2   Change in Wound Size % (l*w) 95.83   Wound Assessment Black; Red   Madison-wound Assessment Other (Comment)  (epithealized)   Red%Wound Bed 10   Black%Wound Bed 90   Wound 12/25/19 Heel Left;Posterior   Date First Assessed/Time First Assessed: 12/25/19 2045   Present on Hospital Admission: Yes  Location: Heel  Wound Location Orientation: Left;Posterior   Wound Image   (left heel)   Wound Deep tissue/Injury   Dressing/Treatment Open to air   Wound Length (cm) 4 cm   Wound Width (cm) 5 cm   Wound Depth (cm)   (undetermined)   Wound Surface Area (cm^2) 20 cm^2   Wound Assessment Purple   Drainage Amount None   Madison-wound Assessment Other (Comment)  (dry skin)     **Informed Consent**     photos taken  and inserted into their chart as part of their permanent medical record for purposes of documentation, treatment management and/or medical review. All Images taken on 1/2/20 of patient name: Luh Raphael were transmitted and stored on secured [employer] located within [facility] by a registered [employer] Mobile Application Device. Impression:    Coccyx and left hip unstageable  Right buttock stage 2  Left heel DTI  Blister to right plantar foot etiology not known      Plan:  Heelmedix boots  Comfort glide system  Seat cushion to separate knees.   Aquaphor to buttocks and coccyx and hips(mepilex not recommended due to stool incontinence. Aquaphor to BLE for dry skin. Continued preventive care.  Prieto Cohen 1/2/2020 2:10 PM

## 2020-01-02 NOTE — DISCHARGE INSTR - COC
([facility] [address] I82.409    Altered mental status R41.82    [facility]) [facility] N17.9    A-fib ([facility]) I48.91    Friction blisters of sole of right foot S90.821A    Alzheimer's dementia without behavioral disturbance ([facility]) G30.9, F02.80    Bilateral lower extremity edema R60.0    OAB (overactive bladder) N32.81    Other pulmonary embolism without acute cor pulmonale ([facility]) I26.99    Hypernatremia E87.0    Dehydration E86.0       Isolation/Infection:   Isolation          No Isolation        Patient Infection Status     Infection Onset Added Last Indicated Last Indicated By Review Planned Expiration Resolved Resolved By    C-diff Rule Out  12/26/19 12/26/19 C. difficile toxin Molecular (Ordered)              Nurse Assessment:  Last Vital Signs: BP (!) 121/58   Pulse 64   Temp 97.3 °F (36.3 °C) (Temporal)   Resp 16   Ht 6' 3\" (1.905 m)   Wt 190 lb 8 oz (86.4 kg)   SpO2 99%   BMI 23.81 kg/m²     Last documented pain score (0-10 scale): Pain Level: 0  Last Weight:   Wt Readings from Last 1 Encounters:   12/31/19 190 lb 8 oz (86.4 kg)     Mental Status:  disoriented and alert    IV Access:  - None    Nursing Mobility/ADLs:  Walking   Dependent  Transfer  Dependent  Bathing  Dependent  Dressing  Dependent  Toileting  Dependent  Feeding  Dependent  Med Admin  Dependent  Med Delivery   crushed    Wound Care Documentation and Therapy:  Wound 08/30/19 Sacrum (Active)   Dressing Status Clean;Dry; Intact 1/2/2020 12:30 AM   Dressing Changed Changed/New 12/28/2019  8:20 AM   Dressing/Treatment Open to air 12/31/2019  4:00 PM   Wound Cleansed Rinsed/Irrigated with saline 12/25/2019  8:45 PM   Wound Length (cm) 6 cm 12/25/2019  8:45 PM   Wound Width (cm) 2 cm 12/25/2019  8:45 PM   Wound Depth (cm) 0.1 cm 12/25/2019  8:45 PM   Wound Surface Area (cm^2) 12 cm^2 12/25/2019  8:45 PM   Change in Wound Size % (l*w) 25 12/25/2019  8:45 PM   Wound Volume (cm^3) 1.2 cm^3 12/25/2019  8:45 PM   Wound Healing % -650 12/25/2019 8:45 PM   Wound Assessment Other (Comment) 12/31/2019  4:00 PM   Number of days: 124       Wound 08/30/19 Toe (Comment  which one) Anterior;Right R GREAT TOE (Active)   Number of days: 124       Wound 08/30/19 Foot Anterior;Right BALL OF FOOT (Active)   Dressing/Treatment Open to air 1/2/2020 12:30 AM   Drainage Amount None 12/28/2019  8:20 AM   Number of days: 124       Wound 12/25/19 Buttocks Right (Active)   Dressing Status Clean;Dry; Intact 1/2/2020 12:30 AM   Dressing/Treatment Other (comment) 12/28/2019  8:20 AM   Wound Cleansed Rinsed/Irrigated with saline 12/25/2019  8:45 PM   Wound Length (cm) 6 cm 12/25/2019  8:45 PM   Wound Width (cm) 2 cm 12/25/2019  8:45 PM   Wound Depth (cm) 0.1 cm 12/25/2019  8:45 PM   Wound Surface Area (cm^2) 12 cm^2 12/25/2019  8:45 PM   Wound Volume (cm^3) 1.2 cm^3 12/25/2019  8:45 PM   Drainage Amount None 1/2/2020 12:30 AM   Number of days: 7       Wound 12/25/19 Hip Left (Active)   Dressing Status Clean;Dry; Intact 1/2/2020 12:30 AM   Dressing Changed Dressing reinforced 12/31/2019  4:00 PM   Dressing/Treatment Other (comment) 12/28/2019  8:20 AM   Wound Cleansed Rinsed/Irrigated with saline 12/25/2019  8:45 PM   Wound Length (cm) 4 cm 12/25/2019  8:45 PM   Wound Width (cm) 3 cm 12/25/2019  8:45 PM   Wound Depth (cm) 0.1 cm 12/25/2019  8:45 PM   Wound Surface Area (cm^2) 12 cm^2 12/25/2019  8:45 PM   Wound Volume (cm^3) 1.2 cm^3 12/25/2019  8:45 PM   Drainage Amount None 1/2/2020 12:30 AM   Number of days: 7       Wound 12/25/19 Heel Left;Posterior (Active)   Dressing/Treatment Open to air 1/2/2020 12:30 AM   Drainage Amount None 1/2/2020 12:30 AM   Number of days: 7       Wound 01/01/20 Hip Right (Active)   Dressing Status Clean;Dry; Intact 1/2/2020 12:30 AM   Dressing Change Due 01/02/20 1/2/2020 12:30 AM   Wound Length (cm) 0.8 cm 1/1/2020 12:32 AM   Wound Width (cm) 1.3 cm 1/1/2020 12:32 AM   Wound Depth (cm) 0.1 cm 1/1/2020 12:32 AM   Wound Surface Area (cm^2) 1.04 cm^2 1/1/2020 12:32 AM   Wound Volume (cm^3) 0.1 cm^3 1/1/2020 12:32 AM   Wound Assessment Pink 1/2/2020 12:30 AM   Drainage Amount None 1/2/2020 12:30 AM   Madison-wound Assessment Dry; Intact 1/1/2020 12:32 AM   Number of days: 1        Elimination:  Continence:   · Bowel: No  · Bladder: No  Urinary Catheter: Removal Date 1/3/20   Colostomy/Ileostomy/Ileal Conduit: No       Date of Last BM: 1/3/20    Intake/Output Summary (Last 24 hours) at 1/2/2020 1044  Last data filed at 1/2/2020 0830  Gross per 24 hour   Intake 1909 ml   Output 1375 ml   Net 534 ml     I/O last 3 completed shifts: In: 1849 [I.V.:1799; IV Piggyback:50]  Out: 5430 [Urine:1375]    Safety Concerns:     History of Falls (last 30 days) and At Risk for Falls    Impairments/Disabilities:      Vision    Nutrition Therapy:  Current Nutrition Therapy:   - Tube Feedings:  Standard with fiber @ 55ml/hr; 175ml H2O flush q4hr    Routes of Feeding: Gastrostomy Tube  Liquids: No Liquids  Daily Fluid Restriction: no  Last Modified Barium Swallow with Video (Video Swallowing Test): not done    Treatments at the Time of Hospital Discharge:   Respiratory Treatments: duonebs  Oxygen Therapy:  is not on home oxygen therapy.   Ventilator:    - No ventilator support    Rehab Therapies: Physical Therapy and Occupational Therapy  Weight Bearing Status/Restrictions: No weight bearing restirctions  Other Medical Equipment (for information only, NOT a DME order):  walker, bedside commode and hospital bed  Other Treatments: Abdominal binder to cover PEG at all times    Patient's personal belongings (please select all that are sent with patient):  Dentures partials    RN SIGNATURE:  Electronically signed by Jayesh White RN on 1/3/20 at 11:51 AM    CASE MANAGEMENT/SOCIAL WORK SECTION    Inpatient Status Date: ***    Readmission Risk Assessment Score:  Readmission Risk              Risk of Unplanned Readmission:        25           Discharging to Facility/ Agency   · Name:

## 2020-01-02 NOTE — ANESTHESIA PRE PROCEDURE
chloride 10 mEq/100 mL IVPB (Peripheral Line)  10 mEq Intravenous Q1H Sharmin Ren  mL/hr at 01/02/20 0706 10 mEq at 01/02/20 0706    ceFAZolin (ANCEF) 2 g in dextrose 3 % 50 mL IVPB (duplex)  2 g Intravenous See Admin Instructions Sophie Moise MD        dextrose 5 % and 0.9 % NaCl with KCl 20 mEq infusion   Intravenous Continuous Clifford Taylor MD   Stopped at 01/02/20 0707    sodium chloride flush 0.9 % injection 10 mL  10 mL Intravenous PRN Zelia Mcburney II, MD   10 mL at 01/01/20 1511    acetaminophen (TYLENOL) suppository 650 mg  650 mg Rectal Q6H PRN Primo Holloway MD        HYDROmorphone (DILAUDID) injection 0.2 mg  0.2 mg Intravenous Q4H PRN Primo Holloway MD   0.2 mg at 01/01/20 0846    pantoprazole (PROTONIX) injection 40 mg  40 mg Intravenous Daily Primo Holloway MD   40 mg at 01/01/20 0846    And    sodium chloride (PF) 0.9 % injection 10 mL  10 mL Intravenous Daily Primo Holloway MD   10 mL at 01/01/20 0846    aspirin suppository 300 mg  300 mg Rectal Daily Primo Holloway MD   300 mg at 01/01/20 0846    heparin (porcine) injection 7,470 Units  80 Units/kg Intravenous PRN Primo Holloway MD        heparin (porcine) injection 3,740 Units  40 Units/kg Intravenous PRN Primo Holloway MD   3,740 Units at 12/28/19 1439    heparin 25,000 units in dextrose 5% 250 mL infusion  18 Units/kg/hr Intravenous Continuous Primo Holloway MD   Stopped at 01/02/20 0256    ipratropium-albuterol (DUONEB) nebulizer solution 1 ampule  1 ampule Inhalation Q4H PIERRE Kingston, DO   1 ampule at 01/01/20 2041       Allergies:  No Known Allergies    Problem List:    Patient Active Problem List   Diagnosis Code    Macular degeneration H35.30    Moderate protein-calorie malnutrition ([address] E44.0    DVT (deep venous thrombosis) ([facility] I82.409    Altered mental status R41.82    [facility] N17.9    A-fib ([facility] I48.91    Friction blisters of sole of right foot E12.411N    2020     2020       CMP:   Lab Results   Component Value Date     2020    K 2.6 2020    K 4.1 2019     2020    CO2 16 2020    BUN 3 2020    CREATININE 0.5 2020    GFRAA >60 2020    LABGLOM >60 2020    GLUCOSE 67 2020    GLUCOSE 84 2012    PROT 3.7 2020    CALCIUM 5.6 2020    BILITOT 0.3 2020    ALKPHOS 46 2020    AST 14 2020    ALT 8 2020       POC Tests: No results for input(s): POCGLU, POCNA, POCK, POCCL, POCBUN, POCHEMO, POCHCT in the last 72 hours.     Coags:   Lab Results   Component Value Date    PROTIME 14.5 2019    INR 1.3 2019    APTT 115.8 2020       HCG (If Applicable): No results found for: PREGTESTUR, PREGSERUM, HCG, HCGQUANT     ABGs: No results found for: PHART, PO2ART, EZQ1EJB, DGF7JWX, BEART, M9IEKWEL     Type & Screen (If Applicable):  No results found for: LABABO, LABRH    EK19  Ventricular Rate 84  BPM Final 2019  4:55 PM [facility]   Atrial Rate 93  BPM Final 2019  4:55 PM [facility]   QRS Duration 112  ms Final 2019  4:55 PM [facility]   Q-T Interval 398  ms Final 2019  4:55 PM [facility]   QTc Calculation (Bazett) 470  ms Final 2019  4:55 PM [facility]   R [place] -57  degrees Final 2019  4:55 PM [facility]   T [place] 67  degrees Final 2019  4:55 PM [facility]   Testing Performed By     Lab - [address] Name Director Address Valid Date Range   360-[facility] MUSE Unknown Unknown 16 0721-Present   Narrative & Impression     Atrial fibrillation with premature ventricular or aberrantly conducted complexes  Left anterior fascicular block  Nonspecific ST abnormality  Abnormal ECG  When compared with ECG of 30-AUG-2019 13:52,  Significant changes have occurred  Confirmed by Nathen Ye ([id number]) on 2019 12:49:27 PM     ECHO: 19  Procedure     Type of Study      TTE procedure:Echo Complete W/Doppler & Color Flow. Procedure Date  Date: 08/21/2019 Start: 08:14 AM     Study Location: Echo Lab  Technical Quality: Poor visualization due to poor acoustical window.     Indications:Congestive heart failure.     Patient Status: Routine     Contrast Medium: Definity.     Height: 76 inches Weight: 227 pounds BSA: 2.34 m^2 BMI: 27.63 kg/m^2     BP: 129/68 mmHg      Findings      Left Ventricle   Micro-bubble contrast injected to enhance left ventricular visualization. No evidence of left ventricular mass or thrombus noted. Normal left ventricle size. Mild concentric left ventricular hypertrophy. proximal septal thickening/hypertrophy. Stage I diastolic dysfunction. Ejection fraction is visually estimated at 55-60%. Right Ventricle   Normal right ventricle structure and function. Left Atrium   Normal left atrium. Interatrial septum not well visualized. Right Atrium   Normal right atrium. Mitral Valve   Physiologic and/or trace mitral regurgitation is present. Tricuspid Valve   Normal tricuspid valve structure and function. Aortic Valve   Normal aortic valve structure and function. Pulmonic Valve   Normal pulmonic valve structure and function. Pericardial Effusion   No evidence for hemodynamically significant pericardial effusion. Aorta   Normal aortic root and   Miscellaneous   The inferior vena cava diameter is normal with normal respiratory   variation. Conclusions      Summary   No significant valvular abnormalities. Micro-bubble contrast injected to enhance left ventricular visualization. No evidence of left ventricular mass or thrombus noted. Normal left ventricle size. Mild concentric left ventricular hypertrophy. proximal septal thickening/hypertrophy. Stage I diastolic dysfunction. Ejection fraction is visually estimated at 55-60%.     Anesthesia Evaluation  Patient summary reviewed and Nursing notes reviewed  Airway: Mallampati: III  TM distance: >3 FB   Neck ROM: full  Mouth opening: > = 3 FB Dental: normal exam         Pulmonary:                              Cardiovascular:          ECG reviewed  Rhythm: regular  Rate: normal  Echocardiogram reviewed                  Neuro/Psych:   (+) psychiatric history (Alzheimer disease ):            GI/Hepatic/Renal:   (+) renal disease: ARF,          ROS comment: Moderate protein-calorie malnutrition   OAB (overactive bladder). Endo/Other:    (+) blood dyscrasia (on Eliquis and ASA): anemia and anticoagulation therapy:., electrolyte abnormalities, . ROS comment: Metabolic syndrome  Macular degeneration  Dysphagia  Dehydration Abdominal:           Vascular:   + DVT, PE (IVC Filter). Anesthesia Plan      MAC     ASA 3       Induction: intravenous. Anesthetic plan and risks discussed with patient. Plan discussed with attending.                   Arthuro Awe   1/2/2020

## 2020-01-02 NOTE — PROGRESS NOTES
Hospitalist Progress Note      PCP: Radhames Julio DO    Date of Admission: 12/25/2019    Chief Complaint: *hypernatremia    Hospital Course: *This is a 15-year-old male with history of pulmonary embolism and dementia, who was not eating well at home was not drinking and became dehydrated.  He also had diarrhea and fecal impaction.  IVAB discontinued, blood cultures were probably contaminant*  Per ID. He failed the swallow eval, and speech recommended that he [facility] a repeat swallow eval in 5 days, will order for 12/31.  His sodium has now returned to normal.  Failed again, will need PEG  , for PEG  Inserted, to start precert for GABRIELLA    Subjective: *confused      Medications:  Reviewed    Infusion Medications   Scheduled Medications    aspirin  81 mg PEG Tube Daily    [START ON 1/3/2020] pantoprazole  40 mg Oral QAM AC    potassium phosphate IVPB  15 mmol Intravenous Once    albumin human  25 g Intravenous Q8H    apixaban  5 mg Oral BID    mineral oil-hydrophilic petrolatum   Topical TID    mineral oil-hydrophilic petrolatum   Topical TID    ipratropium-albuterol  1 ampule Inhalation Q4H WA     PRN Meds: mineral oil-hydrophilic petrolatum **AND** mineral oil-hydrophilic petrolatum, mineral oil-hydrophilic petrolatum **AND** mineral oil-hydrophilic petrolatum, sodium chloride flush, HYDROmorphone      Intake/Output Summary (Last 24 hours) at 1/2/2020 1437  Last data filed at 1/2/2020 0830  Gross per 24 hour   Intake 1909 ml   Output 1375 ml   Net 534 ml       Exam:    BP (!) 121/58   Pulse 64   Temp 97.3 °F (36.3 °C) (Temporal)   Resp 16   Ht 6' 3\" (1.905 m)   Wt 188 lb 11.2 oz (85.6 kg)   SpO2 99%   BMI 23.59 kg/m²       Gen:  Well developed  HEENT: NC/AT, moist mucous membranes,   Neck: supple, trachea midline, no anterior cervical or SC LAD  Heart:  Normal s1/s2, RRR, no murmurs, gallops, or rubs.    Lungs:  cta * bilaterally,   Abd: bowel sounds present, soft, nontender, nondistended, no masses  Extrem:  No clubbing, cyanosis,  pos** edema  Skin: no rashes or lesions  Psych: Not oriented to date, Not oriented to person or Not oriented to place    Capillary Refill: Brisk,< 3 seconds   Peripheral Pulses: +2 palpable, equal bilaterally              Labs:   Recent Labs     12/31/19  0510 01/01/20  0535 01/02/20  0309   WBC 4.8 5.1 4.8   HGB 7.7* 7.9* 7.8*   HCT 24.4* 24.7* 24.3*    261 253     Recent Labs     12/31/19  0510 01/01/20  0535 01/02/20  0309    136 141   K 3.7 4.2 2.6*    104 115*   CO2 22 22 16*   BUN 5* 3* 3*   CREATININE 0.8 0.8 0.5*   CALCIUM 8.1* 8.2* 5.6*   PHOS 2.4* 2.4* 2.0*     Recent Labs     12/31/19  0510 01/01/20  0535 01/02/20  0309   AST 20 21 14   ALT 14 13 8   BILITOT 0.3 0.4 0.3   ALKPHOS 63 69 46     No results for input(s): INR in the last 72 hours. No results for input(s): Alferd Juli in the last 72 hours. Recent Labs     12/31/19  0510 01/01/20  0535 01/02/20  0309   AST 20 21 14   ALT 14 13 8   BILITOT 0.3 0.4 0.3   ALKPHOS 63 69 46     No results for input(s): LACTA in the last 72 hours.   No results found for: Sulema Punch  No results found for: AMMONIA    Assessment:    Active Hospital Problems    Diagnosis Date Noted    Hypernatremia [E87.0] 12/25/2019    Dehydration [E86.0] 12/25/2019    Severe protein-calorie malnutrition ([facility] [E43] 06/15/2018   dysphagia  Hypernatremia resolved  Dementia  PAF   hypokalemia  hypomagnesemia     Plan:  PEG  Cont aerosols   cont heparin drip  Cont zyvox   replace k and mag     DVT Prophylaxis: heparin  Diet: Diet NPO Effective Now  Code Status: Full Code     PT/OT Eval Status: *done     Dispo - *GABRIELLA       Electronically signed by Elex Felty, DO on 1/2/2020 at 2:37 PM [facility]

## 2020-01-03 NOTE — PROGRESS NOTES
[facility]  PROGRESS NOTE  ATTENDING NOTE    PEG in good position  Tolerating tube feeds    Surgery will sign-off, please call if questions    Valentina Steele MD, MSc, FACS  1/3/2020  9:05 AM

## 2020-01-03 NOTE — PLAN OF CARE
Problem: Falls - Risk of:  Goal: Will remain free from falls  Description  Will remain free from falls  Outcome: Met This Shift     Problem: Falls - Risk of:  Goal: Absence of physical injury  Description  Absence of physical injury  Outcome: Met This Shift     Problem: Risk for Impaired Skin Integrity  Goal: Tissue integrity - skin and mucous membranes  Description  Structural intactness and normal physiological function of skin and  mucous membranes. Outcome: Met This Shift     Problem: Malnutrition  (NI-5.2)  Goal: Food and/or Nutrient Delivery  Description  Individualized approach for food/nutrient provision.   1/2/2020 1150 by Gilma Yan, MS, RD, LD  Outcome: Met This Shift

## 2020-01-03 NOTE — CARE COORDINATION
1/3/2020  Patient is ok for skilled admission to [facility] [place] [address] in [facility]. Arranged for [facility]  at 2pm.  Notified patient's son Cheryle Branch of discharge arrangements.

## 2020-01-03 NOTE — PROGRESS NOTES
Hospitalist Progress Note      PCP: Tahmina Chaudhry DO    Date of Admission: 12/25/2019    Chief Complaint: *hypernatremia     Hospital Course: *This is a 40-year-old male with history of pulmonary embolism and dementia, who was not eating well at home was not drinking and became dehydrated.  He also had diarrhea and fecal impaction.  IVAB discontinued, blood cultures were probably contaminant*  Per ID. He failed the swallow eval, and speech recommended that he [facility] a repeat swallow eval in 5 days, will order for 12/31.  His sodium has now returned to normal.  Failed again, will need PEG  , for PEG  Inserted, to start precert for GABRIELLA**  Subjective: ** no complaints      Medications:  Reviewed    Infusion Medications   Scheduled Medications    aspirin  81 mg PEG Tube Daily    pantoprazole  40 mg Oral QAM AC    albumin human  25 g Intravenous Q8H    apixaban  5 mg Oral BID    mineral oil-hydrophilic petrolatum   Topical TID    mineral oil-hydrophilic petrolatum   Topical TID    ipratropium-albuterol  1 ampule Inhalation Q4H WA     PRN Meds: mineral oil-hydrophilic petrolatum **AND** mineral oil-hydrophilic petrolatum, mineral oil-hydrophilic petrolatum **AND** mineral oil-hydrophilic petrolatum, sodium chloride flush, HYDROmorphone      Intake/Output Summary (Last 24 hours) at 1/3/2020 1441  Last data filed at 1/3/2020 0856  Gross per 24 hour   Intake 664 ml   Output 950 ml   Net -286 ml       Exam:    BP (!) 128/59   Pulse 100   Temp 98 °F (36.7 °C) (Temporal)   Resp 18   Ht 6' 3\" (1.905 m)   Wt 188 lb 11.2 oz (85.6 kg)   SpO2 98%   BMI 23.59 kg/m²       Gen:  Well developed  HEENT: NC/AT, moist mucous membranes,   Neck: supple, trachea midline, no anterior cervical or SC LAD  Heart:  Normal s1/s2, RRR, no murmurs, gallops, or rubs.    Lungs:  cta * bilaterally,   Abd: bowel sounds present, soft, nontender, nondistended, no masses  Extrem:  No clubbing, cyanosis,  pos** edema  Skin: no rashes or

## 2020-01-03 NOTE — PROGRESS NOTES
>60 01/03/2020    LABGLOM >60 01/03/2020    GLUCOSE 95 01/03/2020    GLUCOSE 84 04/25/2012    PROT 6.0 01/03/2020    LABALBU 3.4 01/03/2020    LABALBU 4.1 04/25/2012    CALCIUM 8.7 01/03/2020    BILITOT 0.5 01/03/2020    ALKPHOS 71 01/03/2020    AST 24 01/03/2020    ALT 14 01/03/2020     Magnesium:    Lab Results   Component Value Date    MG 2.1 01/03/2020     Phosphorus:    Lab Results   Component Value Date    PHOS 2.0 01/02/2020     Recent Labs     01/02/20  1510   PH 7.470*   PO2 81.5   PCO2 29.9*   HCO3 21.3*   BE -1.8   O2SAT 96.0         Vitamin D 25: 31  Folate: 13.6  10 B12: 3863      Radiology Review:      CT abdomen and pelvis without contrast December 25, 2019   1. Generalized anasarca which makes difficult to evaluate the omental   mesenteric fat planes in the abdomen may in the perirectal spaces are.       2. Findings for fecal rectal retention/impaction.       4. No obstructive uropathy.       5. No dilatation of the biliary tree or biliary ductal system       5. Confluent infiltrate with some consolidations of patch distribution   in the right lower lobe. Pneumonia or aspiration considered. Chest x-ray December 25, 2019   No acute cardiac pulmonary process. BRIEF SUMMARY OF INITIAL CONSULT:    Briefly Mr. 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 His medications prior to admission included Lasix 20 mg p.o. twice daily. Problems resolved:    · DINA stage I, volume responsive prerenal DINA, resolved  · Hypokalemia, multifactorial due to diuretics, poor oral intake.   · Fecal impaction per CT   · Hypernatremia, multifactorial, secondary to diarrhea and decreased oral intake along with diuretics. Resolved. IMPRESSION/RECOMMENDATIONS:      1. Hypokalemia, secondary to no oral intake, K levels improved  2. Hypophosphatemia, due to poor oral intake, improved   3. Hypomagnesemia secondary to poor oral intake, improved   4. GPC bacteremia 2/4, on linezolid  5. History of DVT and PE, status post IVC filter placement, on apixaban  6. Normocytic anemia  7. Severe hypoalbuminemia  8. s/p PEG, TF at 30 cc/hour    Plan:    · Stop iv fluids  · Discharge planning  · We will follow peripherally.

## 2020-01-04 NOTE — DISCHARGE SUMMARY
palpable, equal bilaterally    Consults:     IP CONSULT TO NEPHROLOGY  IP CONSULT TO GENERAL SURGERY  IP CONSULT TO INFECTIOUS DISEASES  IP CONSULT TO PALLIATIVE CARE  IP CONSULT TO GENERAL SURGERY  IP CONSULT TO DIETITIAN  IP CONSULT TO DIETITIAN    Labs: For convenience and continuity at follow-up the following most recent labs are provided:    Lab Results   Component Value Date    WBC 7.5 01/03/2020    HGB 7.7 01/03/2020    HCT 23.4 01/03/2020    MCV 87.3 01/03/2020     01/03/2020     01/03/2020    K 3.8 01/03/2020    K 4.1 08/30/2019     01/03/2020    CO2 20 01/03/2020    BUN 4 01/03/2020    CREATININE 0.8 01/03/2020    CALCIUM 8.7 01/03/2020    PHOS 2.0 01/02/2020    ALKPHOS 71 01/03/2020    ALT 14 01/03/2020    AST 24 01/03/2020    BILITOT 0.5 01/03/2020    LABALBU 3.4 01/03/2020    LABALBU 4.1 04/25/2012    LDLCALC 88 09/19/2017    TRIG 47 09/19/2017     Lab Results   Component Value Date    INR 1.3 05/25/2019    INR 1.3 02/17/2019    INR 1.2 09/01/2018       Radiology:  FL MODIFIED BARIUM SWALLOW W VIDEO   Final Result   Study is remarkable for aspiration with all consistencies   offered. There is retention in the vallecula and piriform sinuses. This represents no change in swallowing function from 12/26/2019   study. This procedure was performed and dictated by Lyssa Ewing PA-C with   indirect supervision, and Cuauhtemoc Andrews Hubbard MD reviewed and concurred with   the findings. CT CHEST W CONTRAST   Final Result      Right lower lobe infiltrate is likely infectious pneumonia. Follow-up   recommended to exclude malignancy. XR CHEST PORTABLE   Final Result   No pneumothorax is evident. No active process. Fluoroscopy modified barium swallow with video   Final Result   Study is remarkable for aspiration with all consistencies   offered. There is oral and pharyngeal delay, and retention in the   vallecula and piriform sinuses.        This procedure was performed and dictated by Maria Luz Lomax PA-C with   indirect supervision, and Andrew Michele. Nick WALLIS reviewed and concurred with   the findings. CT ABDOMEN PELVIS WO CONTRAST Additional Contrast? None   Final Result   1. Generalized anasarca which makes difficult to evaluate the omental   mesenteric fat planes in the abdomen may in the perirectal spaces are.      2. Findings for fecal rectal retention/impaction. 4. No obstructive uropathy. 5. No dilatation of the biliary tree or biliary ductal system      5. Confluent infiltrate with some consolidations of patch distribution   in the right lower lobe. Pneumonia or aspiration considered. XR CHEST PORTABLE   Final Result   No acute cardiac pulmonary process.           Discharge Medications:   Discharge Medication List as of 1/3/2020 11:51 AM      START taking these medications    Details   ipratropium-albuterol (DUONEB) 0.5-2.5 (3) MG/3ML SOLN nebulizer solution Inhale 3 mLs into the lungs every 4 hours (while awake), Disp-360 mLDC to [facility]           Discharge Medication List as of 1/3/2020 11:51 AM        Discharge Medication List as of 1/3/2020 11:51 AM      CONTINUE these medications which have NOT CHANGED    Details   apixaban (ELIQUIS) 5 MG TABS tablet Take 1 tablet by mouth 2 times daily, Disp-60 tablet, R-2Normal      donepezil (ARICEPT) 10 MG tablet Take 1 tablet by mouth nightly, Disp-30 tablet, R-5Normal      pantoprazole (PROTONIX) 40 MG tablet Take 1 tablet by mouth every morning (before breakfast), Disp-30 tablet, R-2Normal      trospium (SANCTURA) 20 MG tablet Take 20 mg by mouth nightlyHistorical Med      Cholecalciferol (VITAMIN D3) 2000 units CAPS Take 1 capsule by mouth dailyHistorical Med      docusate sodium (COLACE) 100 MG capsule Take 100 mg by mouth 2 times dailyHistorical Med      memantine (NAMENDA) 10 MG tablet Take 1 tablet by mouth 2 times daily, Disp-60 tablet, R-5Normal      aspirin 81 MG tablet Take 81 mg by mouth dailyHistorical Med

## 2020-01-13 PROBLEM — K92.2 GI BLEED: Status: ACTIVE | Noted: 2020-01-01

## 2020-01-13 NOTE — ED NOTES
Bed: 07  Expected date: 1/13/20  Expected time: 1:52 PM  Means of arrival:   Comments:  EMS     Vandana Anglin  01/13/20 1272

## 2020-01-13 NOTE — ED PROVIDER NOTES
Conjunctivae normal.      Pupils: Pupils are equal, round, and reactive to light. Neck:      Musculoskeletal: Normal range of motion and neck supple. Thyroid: No thyromegaly. Vascular: No JVD. Trachea: No tracheal deviation. Cardiovascular:      Rate and Rhythm: Normal rate and regular rhythm. Heart sounds: Normal heart sounds. No murmur. No friction rub. No gallop. Pulmonary:      Effort: Pulmonary effort is normal. No respiratory distress. Breath sounds: Normal breath sounds. No wheezing or rales. Abdominal:      General: Bowel sounds are normal. There is no distension. Palpations: Abdomen is soft. Tenderness: There is no tenderness. There is no guarding or rebound. Genitourinary:     Comments: Rectal examination demonstrated a large amount of bright red blood which was briskly positive on guaiac testing. Musculoskeletal: Normal range of motion. Lymphadenopathy:      Cervical: No cervical adenopathy. Skin:     General: Skin is warm and dry. Neurological:      Mental Status: He is alert and oriented to person, place, and time. Psychiatric:         Behavior: Behavior normal.         Thought Content: Thought content normal.         Judgment: Judgment normal.          Procedures   Central Line Placement Procedure Note    Indication: vascular access, central admin meds    Consent: The family members were counseled regarding the procedure, its indications, risks, potential complications and alternatives, and any questions were answered. Consent was obtained to proceed. Procedure: The patient was positioned appropriately and the skin over the right femoral vein was prepped with betadine and draped in a sterile fashion. Local anesthesia was obtained by infiltration using 1% Lidocaine without epinephrine. A large bore needle was used to identify the vein. A guide wire was then inserted into the vein through the needle.  A triple lumen catheter was then inserted answered at this time. --------------------------------------------- PAST HISTORY ---------------------------------------------  Past Medical History:  has a past medical history of Alzheimer disease ([facility] DVT (deep venous thrombosis) ([facility] History of pulmonary embolism, Macular degeneration, Metabolic syndrome, Presence of IVC filter, and Pulmonary emboli ([facility] Past Surgical History:  has a past surgical history that includes hernia repair; Colonoscopy (07/15/2003); polypectomy; other surgical history (02/19/2019); and Gastrostomy tube placement (N/A, 1/2/2020). Social History:  reports that he has never smoked. He has never used smokeless tobacco. He reports previous alcohol use. He reports that he does not use drugs. Family History: family history is not on file. The patients home medications have been reviewed. Allergies: Patient has no known allergies.     -------------------------------------------------- RESULTS -------------------------------------------------    Lab  Results for orders placed or performed during the hospital encounter of 01/13/20   CBC auto differential   Result Value Ref Range    WBC 9.0 4.5 - 11.5 E9/L    RBC 2.30 (L) 3.80 - 5.80 E12/L    Hemoglobin 6.7 (LL) 12.5 - 16.5 g/dL    Hematocrit 21.2 (L) 37.0 - 54.0 %    MCV 92.2 80.0 - 99.9 fL    MCH 29.1 26.0 - 35.0 pg    MCHC 31.6 (L) 32.0 - 34.5 %    RDW 16.5 (H) 11.5 - 15.0 fL    Platelets 068 963 - 448 E9/L    MPV 9.8 7.0 - 12.0 fL    Neutrophils % 79.8 43.0 - 80.0 %    Immature Granulocytes % 0.7 0.0 - 5.0 %    Lymphocytes % 11.9 (L) 20.0 - 42.0 %    Monocytes % 6.2 2.0 - 12.0 %    Eosinophils % 1.0 0.0 - 6.0 %    Basophils % 0.4 0.0 - 2.0 %    Neutrophils Absolute 7.15 1.80 - 7.30 E9/L    Immature Granulocytes # 0.06 E9/L    Lymphocytes Absolute 1.07 (L) 1.50 - 4.00 E9/L    Monocytes Absolute 0.56 0.10 - 0.95 E9/L    Eosinophils Absolute 0.09 0.05 - 0.50 E9/L    Basophils Absolute 0.04 0.00 - 0.20 E9/L Anisocytosis 1+     Polychromasia 1+     Poikilocytes 1+     Acanthocytes 1+     Arely Cells 1+    Comprehensive Metabolic Panel   Result Value Ref Range    Sodium 136 132 - 146 mmol/L    Potassium 4.9 3.5 - 5.0 mmol/L    Chloride 98 98 - 107 mmol/L    CO2 28 22 - 29 mmol/L    Anion Gap 10 7 - 16 mmol/L    Glucose 116 (H) 74 - 99 mg/dL    BUN 22 8 - 23 mg/dL    CREATININE 0.8 0.7 - 1.2 mg/dL    GFR Non-African American >60 >=60 mL/min/1.73    GFR African American >60     Calcium 8.3 (L) 8.6 - 10.2 mg/dL    Total Protein 6.0 (L) 6.4 - 8.3 g/dL    Alb 2.6 (L) 3.5 - 5.2 g/dL    Total Bilirubin 0.3 0.0 - 1.2 mg/dL    Alkaline Phosphatase 80 40 - 129 U/L    ALT 47 (H) 0 - 40 U/L    AST 93 (H) 0 - 39 U/L   Lipase   Result Value Ref Range    Lipase 31 13 - 60 U/L   Lactic Acid, Plasma   Result Value Ref Range    Lactic Acid 2.1 0.5 - 2.2 mmol/L   Protime-INR   Result Value Ref Range    Protime 22.1 (H) 9.3 - 12.4 sec    INR 1.9    Troponin   Result Value Ref Range    Troponin 0.10 (H) 0.00 - 0.03 ng/mL   TYPE AND SCREEN   Result Value Ref Range    ABO/Rh B POS     Antibody Screen NEG    PREPARE RBC (CROSSMATCH)   Result Value Ref Range    Product Code Blood Bank [id number]     Description Blood Bank Red Blood Cells, Leuko-reduced     Unit Number [id number]     Dispense Status Blood Bank issued     Product Code Blood Bank [id number]     Description Blood Bank Red Blood Cells, Leuko-reduced     Unit Number [id number]     Dispense Status Blood Bank issued     Product Code Blood Bank [id number]     Description Blood Bank Red Blood Cells, Leuko-reduced     Unit Number [id number]     Dispense Status Blood Bank released     Product Code Blood Bank [id number]     Description Blood Bank Red Blood Cells, Leuko-reduced     Unit Number [id number]     Dispense Status Blood Bank released     Product Code Blood Bank [id number]     Description Blood Bank Red Blood Cells, Leuko-reduced     Unit Number [id number]     Dispense Status Karyna (Surgery). Discussed case. They will provide consultation. Spoke with Dr. Cherylene Hem (Medicine). Discussed case. They will admit this patient. Reason for transfer: GI bleed. This patient's ED course included: a personal history and physicial examination, re-evaluation prior to disposition, multiple bedside re-evaluations, IV medications, cardiac monitoring, continuous pulse oximetry and complex medical decision making and emergency management    This patient has been closely monitored and initially deteriorated, but then stabilized during their ED course. Please note that the withdrawal or failure to initiate urgent interventions for this patient would likely result in a life threatening deterioration or permanent disability. Accordingly this patient received 60 minutes of critical care time, excluding separately billable procedures. Clinical Impression  1. Gastrointestinal hemorrhage, unspecified gastrointestinal hemorrhage type    2. Acute anemia    3. Altered mental status, unspecified altered mental status type          Disposition  Patient's disposition: Transfer to [facility]. Transferred by: EMS.   Patient's condition is critical.                   Gerardo Escobar DO  Resident  01/13/20 2030

## 2020-01-13 NOTE — TELEPHONE ENCOUNTER
Writer contacted  Garret Staples DO ,ED provider to inform of 30 day readmission risk. ED provider informed writer of readmission.

## 2020-01-13 NOTE — ED NOTES
Lab called with critical Hemoglobin results of 6.7 for this pt.  has been notified.      Newton Alejandra RN  01/13/20 1795

## 2020-01-13 NOTE — ED NOTES
Answered phone call from lab, blue top not full enough, needs re-ordered and re-drawn.       Jeovanny Knowles, RN  01/13/20 9694

## 2020-01-13 NOTE — CONSULTS
GENERAL SURGERY  CONSULT NOTE  1/13/2020    Physician Consulted: Dr. Sumit Dobbins  Reason for Consult: GI bleed  Referring Physician: Dr. Barbara Bhardwaj    HPI  Mihir Bright is a 80 y.o. male who presents for evaluation of GI bleed. PMH Afib on Eliquis, dementia, hx of DVT/PE s/p IVC filter. Patient began having bright red blood per rectum at nursing home for 1 day and was transferred here for evaluation. Much of history is obtained from medical record as patient is a poor historian. He is awake and alert to self. He denies abdominal pain, nausea, vomiting. Presently he is hypotensive, Hgb 6.7. Is being given Feiba for reversal of his Eliquis and ED has ordered 4U PRBCs. Known to Dr. Cristy Griffith for hernia repair 5/2018. S/p PEG by Dr. Rigoberto Mendez on 1/2 during recent hospitalization for failure to thrive and dysphagia. Unsure when his last colonoscopy was    CTA a/p reviewed, no clear blush or obvious source of bleeding. Lavage of PEG performed by ED staff and also by Surgical resident at bedside which yielded lavage + tube feeds. Non-bilious, no blood. Past Medical History:   Diagnosis Date    Alzheimer disease ([facility]     DVT (deep venous thrombosis) ([facility])     History of pulmonary embolism     WITH RIGHT VENTRICULAR INFARCTION    Macular degeneration     Metabolic syndrome     Presence of IVC filter     Pulmonary emboli ([facility])        Past Surgical History:   Procedure Laterality Date    COLONOSCOPY  07/15/2003    GASTROSTOMY TUBE PLACEMENT N/A 1/2/2020    EGD PEG TUBE PLACEMENT performed by Liz Anderson MD at [facility] HISTORY  02/19/2019    Dr. Krunal Justin- IVC Filter    POLYPECTOMY      X 2       Medications Prior to Admission    Prior to Admission medications    Medication Sig Start Date End Date Taking?  Authorizing Provider   ipratropium-albuterol (DUONEB) 0.5-2.5 (3) MG/3ML SOLN nebulizer solution Inhale 3 mLs into the lungs every 4 hours (while awake)

## 2020-01-14 PROBLEM — E43 SEVERE PROTEIN-CALORIE MALNUTRITION (HCC): Chronic | Status: ACTIVE | Noted: 2018-06-15

## 2020-01-14 NOTE — CARE COORDINATION
Pt is oriented to person only. Called son Villa Dillard to discuss transition of care. Pt was recently admitted to hospital ( 12/25/19) for dehydration and malnutrition. Peg was placed at that time. He discharged to [address] [place] [address] on 1/3/20. He returned to ED from SNF on 1/13 for evaluation of rectal bleed. Per Villa Dillard, plan is to return to [facility] when stable. Will need PT/Ot evals when stable and ins precert to return to [facility]. Addendum:  Informed by SOKIERSTEN engson that Villa Dillard is pt's Jesus Hernadez, not his son.

## 2020-01-14 NOTE — CONSULTS
Hypotensive likely secondary to bleeding. Transfuse 1u RBC now.  Troponin 0.1 at [facility], trend q6h  Endocrine: No acute issues    DVT Prophylaxis: PCDs,  Ulcer Prophylaxis: PPI BID  Tubes and Lines: R IJ central line 1/13   Ancillary consults:  IM (admitting), General Surgery, Critical Care, Palliative  Family Update: Son and granddaughter updated at bedside  CODE Status:       Full Code    Dispo: SICU    Electronically signed by Karly Franco MD on 1/14/2020 at 12:33 AM

## 2020-01-14 NOTE — PROGRESS NOTES
Hospitalist Progress Note      PCP: Heidi Hernandez DO    Date of Admission: 1/13/2020    Subjective:     dw RN  Still rectal bleed  Got 3 u rbc, 2u ffp overnite  Pt sleeping, no distress      Medications:  Reviewed    Infusion Medications    sodium chloride 100 mL/hr at 01/14/20 0545     Scheduled Medications    donepezil  10 mg Oral Nightly    memantine  10 mg Oral BID    sodium chloride flush  10 mL Intravenous 2 times per day    ipratropium-albuterol  3 mL Inhalation Q4H WA    trospium  20 mg Oral Nightly    pantoprazole  40 mg Intravenous BID    And    sodium chloride (PF)  10 mL Intravenous Daily     PRN Meds: sodium chloride flush, magnesium hydroxide, ondansetron      Intake/Output Summary (Last 24 hours) at 1/14/2020 0734  Last data filed at 1/14/2020 0700  Gross per 24 hour   Intake 2025 ml   Output 425 ml   Net 1600 ml       Physical Exam Performed:    BP (!) 102/49   Pulse 72   Temp 97.3 °F (36.3 °C) (Temporal)   Resp 17   Wt 196 lb 3.2 oz (89 kg)   SpO2 100%   BMI 24.52 kg/m²     GENERAL:  NAD. A&Ox3. On RA  HEAD:  Normocephalic. Atraumatic. EYES:   No scleral icterus. PERRL. LUNGS:  No increased work of breathing. CARDIOVASCULAR: RRR  ABDOMEN:  Soft, non-distended, non-tender. No guarding, rigidity, rebound.  PEG in LUQ is in good position.    EXTREMITIES:   MAEx4. Atraumatic. No LE edema.   SKIN:  Warm and dry  NEUROLOGIC:  GCS   Psych normal mood/affect      Labs:   Recent Labs     01/13/20  1444 01/14/20  0046 01/14/20  0455   WBC 9.0 16.3* 14.5*   HGB 6.7* 5.1* 7.3*  7.5*   HCT 21.2* 16.4* 22.9*  23.1*    287 266     Recent Labs     01/13/20  1444 01/14/20  0545    139   K 4.9 4.5   CL 98 103   CO2 28 24   BUN 22 28*   CREATININE 0.8 0.9   CALCIUM 8.3* 8.1*     Recent Labs     01/13/20  1444 01/14/20  0545   AST 93* 66*   ALT 47* 39   BILITOT 0.3 0.5   ALKPHOS 80 60     Recent Labs     01/13/20  1549 01/14/20  0046   INR 1.9 1.5     Recent Labs 01/13/20  1444 01/14/20  0015 01/14/20  0545   TROPONINI 0.10* 0.11* 0.10*       Urinalysis:      Lab Results   Component Value Date    NITRU Negative 12/25/2019    WBCUA 5-10 12/25/2019    BACTERIA MANY 12/25/2019    RBCUA >20 12/25/2019    RBCUA NONE 11/08/2012    BLOODU LARGE 12/25/2019    SPECGRAV 1.020 12/25/2019    GLUCOSEU Negative 12/25/2019       Radiology:  No orders to display           Assessment/Plan:    Active Hospital Problems    Diagnosis    GI bleed [K92.2]        79 yo male from NH hx dementia, dvt/pe/ivc , afib on eliquis, peg placement on 1-2-20 for FTT/dyshphagia came to ER for bright rectal bleeding, pt denies any gi c/os of n/v/abd pain, gen sx saw in ER at [facility] ctap no clear bleeding, lavage in ER no bleeding, being given feiba for reversal of eliquis, ordered 4 u blood , hypotension , sent to ICU, pt demented, poor historian on RA, no family at bedside, HB 6.7 bun normal     RECTAL LGIB/ANEMIA  DEMENTIA  PEG TF  AFIB  DVT/PE /IVC /ELIGUIS  BPH  COPD        PLAN  Monitor h/h, tx hb>8  gen sx c/sed  Iv ppi  Holding AC  Peg tube in gravity  Pall c/sed  Tentatively plan for EGD although most likely is lower GI.     DVT Prophylaxis: scd  Diet: Diet NPO Effective Now  Code Status: Full Code    PT/OT Eval Status: na    Dispo - ip    Sandra eSn MD

## 2020-01-14 NOTE — ED NOTES
IV infiltrated in pt left upper arm during blood administration. Blood administration was moved to pt triple lumen  in pt right groin area.  Approximately 20 ml infiltrated into pt left  upper arm     Kyler Shoulder, RN  01/13/20 2123       Kyler Shoulder, RN  01/13/20 2125

## 2020-01-14 NOTE — H&P
Hospital Medicine History & Physical      PCP: Sal De La Garza DO    Date of Admission: 1/13/2020    Date of Service:  1-13-20    Chief Complaint:  RECTAL BLEED      History Of Present Illness:      81 yo male from NH hx dementia, dvt/pe/ivc , afib on eliquis, peg placement on 1-2-20 for FTT/dyshphagia came to ER for bright rectal bleeding, pt denies any gi c/os of n/v/abd pain, gen sx saw in ER at [place] [address] ctap no clear bleeding, lavage in ER no bleeding, being given feiba for reversal of eliquis, ordered 4 u blood , hypotension , sent to ICU, pt demented, poor historian on RA, no family at bedside, HB 6.7 bun normal    Past Medical History:          Diagnosis Date    [facility]     DVT [facility]deep venous thrombosis) ([facility]     History of pulmonary embolism     WITH RIGHT VENTRICULAR INFARCTION    Macular degeneration     Metabolic syndrome     Presence of IVC filter     Pulmonary emboli ([facility]        Past Surgical History:          Procedure Laterality Date    COLONOSCOPY  07/15/2003    GASTROSTOMY TUBE PLACEMENT N/A 1/2/2020    EGD PEG TUBE PLACEMENT performed by Molly Rodgers MD at [facility] 157 HISTORY  02/19/2019    Dr. Luanne Abdullahi- IVC Filter    POLYPECTOMY      X 2       Medications Prior to Admission:      Prior to Admission medications    Medication Sig Start Date End Date Taking?  Authorizing Provider   ipratropium-albuterol (DUONEB) 0.5-2.5 (3) MG/3ML SOLN nebulizer solution Inhale 3 mLs into the lungs every 4 hours (while awake) 1/3/20   Juanito Robertson, DO   apixaban (ELIQUIS) 5 MG TABS tablet Take 1 tablet by mouth 2 times daily 11/4/19   Brando Quintanilla, DO   donepezil (ARICEPT) 10 MG tablet Take 1 tablet by mouth nightly 11/4/19   Brando Quintanilla, DO   pantoprazole (PROTONIX) 40 MG tablet Take 1 tablet by mouth every morning (before breakfast) 11/4/19   Brando Quintanilla, DO   trospium (SANCTURA) 20 MG tablet Take 20 mg by mouth nightly Historical Provider, MD   Cholecalciferol (VITAMIN D3) 2000 units CAPS Take 1 capsule by mouth daily    Historical Provider, MD   docusate sodium (COLACE) 100 MG capsule Take 100 mg by mouth 2 times daily    Historical Provider, MD   memantine (NAMENDA) 10 MG tablet Take 1 tablet by mouth 2 times daily 11/30/18   Brando Quintanilla,    aspirin 81 MG tablet Take 81 mg by mouth daily    Historical Provider, MD       Allergies:  Patient has no known allergies. Social History:      The patient currently lives at [address] [place] [address] [place]:   reports that he has never smoked. He has never used smokeless tobacco.  ETOH:   reports previous alcohol use. E-Cigarettes Vaping or Juuling     Questions Responses    E-Cigarette Use Never User    Start Date     Does device contain nicotine? Never    Quit Date     E-Cigarette Type             Family History:       Reviewed in detail and negative for DM, CAD, Cancer, CVA. Positive as follows:    No family history on file. REVIEW OF SYSTEMS:   Pertinent positives as noted in the HPI. All other systems reviewed and negative. PHYSICAL EXAM PERFORMED:    BP (!) 96/48   Pulse 75   Temp 98.3 °F (36.8 °C) (Temporal)   Resp 18   Wt 196 lb 3.2 oz (89 kg)   SpO2 100%   BMI 24.52 kg/m²     GENERAL:  NAD. A&Ox3. HEAD:  Normocephalic. Atraumatic. EYES:   No scleral icterus. PERRL. LUNGS:  No increased work of breathing. CARDIOVASCULAR: RRR  ABDOMEN:  Soft, non-distended, non-tender. No guarding, rigidity, rebound. PEG in LUQ is in good position. EXTREMITIES:   MAEx4. Atraumatic. No LE edema. SKIN:  Warm and dry  NEUROLOGIC:  GCS   RECTAL: No hemorrhoids. FOBT mucus mixed with blood.   Chux pad saturated with blood      Labs:     Recent Labs     01/13/20  1444   WBC 9.0   HGB 6.7*   HCT 21.2*        Recent Labs     01/13/20  1444      K 4.9   CL 98   CO2 28   BUN 22   CREATININE 0.8   CALCIUM 8.3*     Recent Labs     01/13/20  1444   AST 93*   ALT 47*   BILITOT 0.3

## 2020-01-14 NOTE — PLAN OF CARE
Problem: Malnutrition  (NI-5.2)  Goal: Food and/or Nutrient Delivery  Description- Monitor/ EN when able   Individualized approach for food/nutrient provision.   Outcome: Met This Shift

## 2020-01-14 NOTE — PROGRESS NOTES
Palliative Medicine Social Work     Patient Name: Kennedy Mcrae  Age: 80 y.o. [place] Status: [facility]    Decision-maker: Son Jodie Delarosa [phone] is primary agent DPOA-HC    Additional Support: Letty Wilder [phone], granddaughter Johnie Hodgkin [phone] all listed as contacts    Minor Children: None noted. Advanced Directives: Pt has DPOA-HC dated 8-6-18 which names son/, Jodie Delarosa [phone] as primary agent, first alternate is cousin Radha Becerra [phone], second alternate is cousin Davonte Isaacs [phone]. Pt did not initial for agent to make decisions related to artificially or technologically supplied nutrition or hydration if pt should be in permanently unconscious state or terminal condition. Pt also does not initial this section directing that care be withdrawn in that circumstance on the Living Will. Both documents located in Media in electronic chart. Confirm Code Status: Full. Son would like to continue with all care. Current Goals of Care: live longer, improve or maintain function/ quality of life, remain at home and continue current management    Mental Health History: None noted. Substance Abuse: None noted. Indications of Abuse/Neglect: No concerns expressed. Financial Concerns: No concerns expressed. Living Situation: Pt recently at [place] [address] for skilled therapies. He previously was at home with his wife, was also at [place] [address] earlier this year. Physical Care Needs Met: Yes    Emotional Needs Met: time spent exploring pts thoughts and feelings, providing support through active listening and validation of feelings. Future Care Needs/Goals/Anticipatory Guidance: None at this time. Referral Needs: None at this time. Assessment: LSW for PM met with pt at bedside along with PM NP. Pt sleepy, not able to engage at length.  He does give permission to speak with his son and denies

## 2020-01-14 NOTE — CONSULTS
GENERAL SURGERY  CONSULT NOTE  1/14/2020    Physician Consulted: Dr. Bogdan Call  Reason for Consult: BRBPR  Referring Physician: Dr. Flores Height: Paula Shi is a 80 y.o. male with PMH AF on Eliquis, DVT/PE with IVCF dementia, PEG 1/2 for dysphagia, who is transferred to [facility] for treatment of GI bleed. He presented to [facility] ED yesterday with 1 day history of BRBPR. He was hypotensive and Hb was 6.7, down from 7.8 two weeks ago. PEG lavage yesterday returned tube feeds, no bile or blood, and CTA AP showed no active bleeding. Has received FEIBA and 2u RBC. Poor historian.     PMH as above.     PSH hernia repair 5/2018, and PEG 1/2 for failure to thrive and dysphagia. Unknown when last colonoscopy was. Past Medical History:   Diagnosis Date    [facility] disease ([facility]     DVT (deep venous thrombosis) ([facility])     History of pulmonary embolism     WITH RIGHT VENTRICULAR INFARCTION    Macular degeneration     Metabolic syndrome     Presence of IVC filter     Pulmonary emboli ([facility])        Past Surgical History:   Procedure Laterality Date    COLONOSCOPY  07/15/2003    GASTROSTOMY TUBE PLACEMENT N/A 1/2/2020    EGD PEG TUBE PLACEMENT performed by Hosea Hernández MD at [facility] 157 HISTORY  02/19/2019    Dr. Candi Boudreaux- IVC Filter    POLYPECTOMY      X 2       Medications Prior to Admission:    Prior to Admission medications    Medication Sig Start Date End Date Taking?  Authorizing Provider   ipratropium-albuterol (DUONEB) 0.5-2.5 (3) MG/3ML SOLN nebulizer solution Inhale 3 mLs into the lungs every 4 hours (while awake) 1/3/20   Juanito Glaser, DO   apixaban (ELIQUIS) 5 MG TABS tablet Take 1 tablet by mouth 2 times daily 11/4/19   Brando Quintanilla,    donepezil (ARICEPT) 10 MG tablet Take 1 tablet by mouth nightly 11/4/19   Brando Quintanilla,    pantoprazole (PROTONIX) 40 MG tablet Take 1 tablet by mouth every morning (before breakfast) 11/4/19   Brando HORTON DO Dwight   trospium (SANCTURA) 20 MG tablet Take 20 mg by mouth nightly    Historical Provider, MD   Cholecalciferol (VITAMIN D3) 2000 units CAPS Take 1 capsule by mouth daily    Historical Provider, MD   docusate sodium (COLACE) 100 MG capsule Take 100 mg by mouth 2 times daily    Historical Provider, MD   memantine (NAMENDA) 10 MG tablet Take 1 tablet by mouth 2 times daily 11/30/18   Brando Quintanilla DO   aspirin 81 MG tablet Take 81 mg by mouth daily    Historical Provider, MD       No Known Allergies    No family history on file. Social History     Tobacco Use    Smoking status: Never Smoker    Smokeless tobacco: Never Used   Substance Use Topics    Alcohol use: Not Currently     Comment: occasional    Drug use: Never         Review of Systems   General ROS: negative  Hematological and Lymphatic ROS: positive for - on eliquis, hx of DVT/PE, hx of IVC filter  Respiratory ROS: no cough, shortness of breath, or wheezing  Cardiovascular ROS: no chest pain or dyspnea on exertion  Gastrointestinal ROS: as above  Genito-Urinary ROS: no dysuria, trouble voiding, or hematuria  Musculoskeletal ROS: negative      PHYSICAL EXAM:    Vitals:    01/14/20 0345   BP: (!) 106/54   Pulse: 77   Resp: 17   Temp:    SpO2: 100%     General appearance: alert, cooperative and in no acute distress. Oriented to self. Eyes: grossly normal  Lungs: nonlabored breathing on room air  Heart: regular rate, irregular rhythm  Abdomen: soft, non-tender, non distended. G tube clamped with tube feed material in tubing  Rectal: no masses.  Maroon stool, also on bedding  Skin: No skin abnormalities  : Valdovinos with light clear yellow urine    LABS:  CBC  Recent Labs     01/14/20  0046 01/13/20  1444 01/03/20  0600   WBC 16.3* 9.0 7.5   HGB 5.1* 6.7* 7.7*   HCT 16.4* 21.2* 23.4*   MCV 88.6 92.2 87.3    396 255     BMP  Recent Labs     01/13/20  1444      K 4.9   CL 98   CO2 28   BUN 22   CREATININE 0.8   CALCIUM 8.3*     Liver

## 2020-01-14 NOTE — PROGRESS NOTES
ambulate nor participate in therapy but does get to a wheelchair daily. Pt required total assistance of two to sit at EOB. Pt tolerated sitting for >8 minutes. Pt reported increased pain with attempts at mobility. Significant B knee flexion contractures observed with increased pain with attempts at ROM. Pt was returned to R side lying with pillows positioned to protect joint and skin integrity. All needs met and call light in reach. Will discontinue skilled PT as pt has no acute needs and appears to be at baseline. Patient education  Pt educated on safety    Patient response to education:   Pt verbalized understanding Pt demonstrated skill Pt requires further education in this area   no no no     Pts/ family goals   1. None stated    Patient and or family understand(s) diagnosis, prognosis, and plan of care. PLAN  Discontinue skilled PT.     Time in: 5965  Time out: [id number]

## 2020-01-14 NOTE — FLOWSHEET NOTE
Inpatient Wound Care(Initial Consult) 3804    Admit Date: 1/13/2020 10:11 PM    Reason for consult:  Multiple wounds    Significant history:  Admitted for GIB    Findings:     01/14/20 1504   Wound 01/13/20 Buttocks Right   Date First Assessed/Time First Assessed: 01/13/20 2220   Present on Hospital Admission: Yes  Location: Buttocks  Wound Location Orientation: Right   Wound Image    Wound Pressure Stage  2   Dressing/Treatment Pharmaceutical agent (see MAR)   Wound Length (cm) 2 cm   Wound Width (cm) 5 cm   Wound Depth (cm) 0.1 cm   Wound Surface Area (cm^2) 10 cm^2   Change in Wound Size % (l*w) 16.67   Wound Volume (cm^3) 1 cm^3   Wound Healing % 17   Wound Assessment Red   Odor None   Madison-wound Assessment Intact   Red%Wound Bed 100   Wound 12/25/19 Hip Left   Date First Assessed/Time First Assessed: 12/25/19 2045   Present on Hospital Admission: Yes  Location: Hip  Wound Location Orientation: Left   Wound Image   (both sites one photo)   Wound Pressure Stage  2   Dressing/Treatment Pharmaceutical agent (see MAR)   Wound Length (cm) 3 cm   Wound Width (cm) 1 cm   Wound Depth (cm) 0.1 cm   Wound Surface Area (cm^2) 3 cm^2   Change in Wound Size % (l*w) 75   Wound Volume (cm^3) 0.3 cm^3   Wound Healing % 75   Wound Assessment Red   Drainage Amount None   Madison-wound Assessment Intact   Red%Wound Bed 100   Wound 01/13/20 Heel Left   Date First Assessed/Time First Assessed: 01/13/20 2220   Present on Hospital Admission: Yes  Location: Heel  Wound Location Orientation: Left   Wound Image    Wound Pressure Unstageable   Dressing/Treatment Open to air   Wound Length (cm) 4 cm   Wound Width (cm) 5 cm   Wound Depth (cm)   (undetermined)   Wound Surface Area (cm^2) 20 cm^2   Change in Wound Size % (l*w) 0   Wound Assessment Dry;Black   Drainage Amount None   Madison-wound Assessment Intact   Black%Wound Bed 100   Wound 01/13/20 Coccyx   Date First Assessed/Time First Assessed: 01/13/20 2220   Present on Hospital Admission: their chart as part of their permanent medical record for purposes of documentation, treatment management and/or medical review. All Images taken on 1/14/20 of patient name: Lavon Sat were transmitted and stored on secured [employer] located within [facility] by a registered [employer] Mobile Application Device. Impression:    Coccyx stage 3  Left heel unstageable  Right buttock stage 2  Left hip stage 2    Plan:  Madison rectal wound evaluated by Britta Carrillo and . Aquaphor to coccyx,buttocks and hips  Dressing not recommended due to constant soilage of stool. Heelmedix boots  Continued preventive care.  Kraig Avalos 1/14/2020 3:30 PM

## 2020-01-14 NOTE — CONSULTS
Palliative Care Department  Palliative Care Initial Consult  Provider: Julian Antonio APRN-CNP    Hospital Day: 2    Referring Provider:  Lou Thakur MD    Reason for Consult:  []  Code status Discussion  [x]  Assist with goals of care  [x]  Psychosocial support  [x]  Symptom Management  []  Advanced Care Planning    Chief Complaint: Jessi Aguilar is a 80 y.o. male with chief complaint of rectal bleeding    Active Hospital Problems    Diagnosis Date Noted    GI bleed [K92.2] 01/13/2020       Palliative Care Encounter/Recommendations:      - Goals of care: support for family/caregiver and continue current management     - Code Status: full code     - Symptom Management:    Symptom Medications Number Doses in Past 24 hrs   Pain     Nausea/vomiting Zofran 4 mg Q6 H prn 0   Bowel Regime/constipation     Anxiety/agitation/depression     SOB Duoneb Q4 H while awake 0   Appetite     Sleep         Symptom management per ICU team  Subjective:     HPI:  Jessi Aguilar is a 80 y.o. male with significant past medical history of Alzheimer's dementia, atrial fibrillation, DVT, history of PE, macular degeneration, IVC filter, was on anticoagulation with Eliquis who presented to [facility] from the nursing home for evaluation of bright red rectal bleeding. During his ER stay he had multiple large bowel movements with bright red blood and fluctuating blood pressures. His hemoglobin level was critical 6.7 g/dL. Patient was given Feiba for Eliquis reversal and 4 Units of PRBCs was ordered for transfusion. Lavage of PEG tube was completed at the bedside, no bilious or blood content was returned, only enteral product. PEG tube was placed to gravity for relief of abdominal distention.   At this time a central line was placed and it was decided that the patient be transferred to SICU at [facility] for continued care in anticipation that patient may require IR   Living status: nursing home   Work history: unknown    No Known Allergies    ROS: UNLESS STATED PATIENT DENIES:  CONSTITUTIONAL:  fever, chill, rigors, nausea, vomiting, fatigue. HEENT: blurry vision, double vision, hearing problem, tinnitus, hoarseness, dysphagia, odynophagia  RESPIRATORY: cough, shortness of breath, sputum expectoration. CARDIOVASCULAR:  Chest pain/pressure, palpitation, syncope, irregular beats  GASTROINTESTINAL:  abdominal or rectal pain, diarrhea, constipation, . GENITOURINARY:  Burning, frequency, urgency, incontinence, discharge  INTEGUMENTARY: rash, wound, pruritis  HEMATOLOGIC/LYMPHATIC:  Swelling, sores, gum bleeding, easy bruising, pica. MUSCULOSKELETAL:  +knee pain, edema, joint swelling or redness  NEUROLOGICAL:  light headed, dizziness, loss of consciousness, weakness, change in memory, seizures, tremors    Objective:     Physical Exam  BP (!) 99/49   Pulse 73   Temp 97.1 °F (36.2 °C) (Temporal)   Resp 9   Wt 196 lb 3.2 oz (89 kg)   SpO2 100%   BMI 24.52 kg/m²     Gen:  Lethargic, appears ill, well nourished, in no acute distress  HEENT:  Normocephalic, conjunctiva pink, no drainage, mucosa moist  Neck:  Supple  Lungs:  Room air, CTA, Diminished bilaterally, shallow, no audible rhonchi or wheezes noted  Heart[de-identified]  Irregular, A. Fib on the monitor, no murmur, rub, or gallop noted during exam  Abd:  Soft, non tender, distended, BS+, +rectal bleeding  :  Valdovinos catheter  Ext:  Limited movement upper extremities, lower extremities contracted, generalized edema, pulses present  Skin:  Warm and dry, + multiple wounds  Neuro:  Pupils sluggish, Alert, oriented x 1; following commands, arouses easily    Imaging    CTA Abdomen/Pelvis W Contrast 1/13/2020  There is no identified active GI bleed or any clearly evident   underlying cause of bleeding such as a GI neoplasm. The 3 visceral   arteries as well as both renal arteries are widely patent.  The liver,   gallbladder, spleen

## 2020-01-14 NOTE — PROGRESS NOTES
[facility] center called at this time with a bed assignment patient going to 3804A at [facility] main Call Nurse to Nurse [phone] epid infusion of Bupiv 0.1% 200cc with fentanyl 500mcg started at 0900 at 10cc/hr

## 2020-01-14 NOTE — PROGRESS NOTES
CRITICAL CARE ATTENDING     CC: LGIB    SIGNIFICANT 24 HOUR EVENTS/NEW COMPLAINTS:  1/14/20 - transferred from [facility] to SICU last night for management of LGIB;  Received multiple transfusions overnight (5 u rbcs, 2 ffp) and FEIBA;      PHYSICAL EXAM:  Vitals:    01/14/20 1233 01/14/20 1300 01/14/20 1400 01/14/20 1500   BP:  114/68 105/61 (!) 106/51   Pulse: 85 83 82 79   Resp:  12 16 12   Temp:   98.1 °F (36.7 °C)    TempSrc:   Temporal    SpO2: 100% 100% 100% 95%   Weight:       Height:           I/O last 3 completed shifts: In: 2864 [I.V.:839; Blood:1450; IV Piggyback:575]  Out: 1 [Urine:1000; Emesis/NG output:50; Stool:3]    Neuro - Awake, alert, attentive but disoriented  HEENT - oral mucosa moist  CV - irregular, well-perfused  Pulm - non labored, room air  Abdomen - non distended, non tender ; PEG in epigastrium  Musculoskeletal - bilateral LE SCDs  Skin - multiple wounds on buttocks/heels/  Rectal - large bloody, purulent draining posterior midline wound with ?mass intrarectal    Lines - right femoral CVC 1/13    ASSESSMENT/PLAN:  --LGIB due to anorectal abscess vs tumor vs necrotic hemorrhoid   -spoke with gen surg   -planning for EUA   -start IV ATB    --alzheimer's dementia   -home aricept, namenda    --Atrial fibrillation with controlled ventricular rate    -rate control    -hold anticoagulation     H/o DVT/PE   -s/o IVC filter 2/2019    --COPD   -resume bronchodilators    --coagulopathy d/t OAC/eliquis   -hold   -reversed with FEIBA/FFP    ---Acute blood loss anemia - improved   -Hgb =  7.5  -no active bleeding  -monitor H&H       Analgesia/sedation plan: tylenol  Prophylaxis:  SCDs, hold  Code Status: FULL; pall med following  Disposition: ICU    The patient is at significant risk for life-threatening hemodynamic deterioration and death and requires ongoing critical care management.   Critical care time exclusive of teaching and procedures =  37 minutes      Miguel Olea MD, FACS

## 2020-01-14 NOTE — PROGRESS NOTES
OCCUPATIONAL THERAPY INITIAL EVALUATION      Date:2020  Patient Name: Lakisha Andrew  MRN: [id number]  : 1932  Room: [facility]      Evaluating OT: Chaitanya Chance, OTR/L [id number]    AM-PAC Daily Activity Raw Score:     Recommended Adaptive Equipment: mechanical lift for transfers     Comments: Based on patient's functional performance as documented below and prior level of function, patient is at baseline status. Will discontinue OT orders at this time. Diagnosis: GI Bleed     Pertinent Medical History: recent admission for dehydration and malnutrition s/p PEG  & discharge to nursing facility. Alzheimer's disease, PE, DVT, Metabolic syndrome, macular degeneration    Precautions:  Falls, PEG, sacral wounds, dementia, macular degeneration, B LE contractures      Home Living: Pt admitted from nursing facility per son present. Son reports pt was not active with therapy & that pt has not ambulated in ~ 3 months. Pt reports facility transfers pt to chair using standing lift. Pt is a poor historian due to cognitive deficits. Prior Level of Function: Dep with ADLs; non ambulatory. Pain Level: pt c/o pain B LE with ROM attempt in prep of mobility. Pt cannot rate. L hip pain increasing with mobility. Cognition: A&O: 1/  Pt oriented to self only. Son present to assist with social history. Follows occasional gross 1 step commands (hand grasp.)  Pt demo poor tolerance with UB ROM. Memory: P   Comprehension P+   Problem solving: P   Judgement/safety: P               Communication skills: grossly WFL; speech difficult to understand. Vision: macular degeneration per chart; impaired tracking and eye contact.                 Glasses:yes                                                   Hearing: grossly WFL     RASS: -1 to 0  CAM-ICU: (+) Delirium    UE Assessment: Pt resists ROM > L UE  Hand Dominance: Right [x]  Left []     ROM Strength   RUE  Poor ability to follow ROM commands; demonstrates poor shoulder functional reach; gross R elbow flexion; gross grasp to hold onto washcloth. 2-/5 shoulder    3-/5 elbow    3+/5 grasp   Impaired FMS       LUE Poor shoulder ROM; gross elbow flexion; gross grasp 2-/5 shoulder    3-/5 elbow    3-/5 grasp      Sensation: No c/o numbness or tingling in extremities   Tone: impaired: resistive B UE's with ROM assessment; B LE contractures   Edema: [facility]     Functional Assessment   Initial Eval Status  Date: 1/14   Feeding DEP/PEG   Grooming Dep; Max A to grasp wash cloth. Hand over hand assist to reach up to wipe mouth for oral care. UB dressing/bathing Dep   LB dressing/bathing Dep   Toileting Dep   Bed Mobility  Supine to sit: Dep+2 to EOB  (rigid)    Sit to supine: Dep+2   Functional Transfers Sit to stand: NT    Stand to sit: NT   Functional Mobility NT   Balance Sitting:     Static:  Dep/EOB; max cues to correct balance with poor follow through    Dynamic:Dep  Standing: NT   Endurance/Activity Tolerance   P+ tolerance with light activity    Goal: Pt to tolerate sitting EOB 5 min in prep of upright activity. *Pt tolerated >8 min with total assist for trunk control. Goal Met. Visual/  Perceptual          Impaired  Visual attention  Body awareness                     Vitals:   HR at rest: 82 bpm HR at end of session: 84 bpm   Spo2 at rest:100% Spo2 at end of session 96%   BP at rest:114/68 mmHg BP at end of session 112/78 mmHg     Comments/Treatment: OK from RN to see patient. Upon arrival, patient supine in bed; difficult to awaken. Pt difficult to understand. Son present reports pt has not been ambulating in \"three months. \"  Son agreeable to session. Patient assisted to EOB in collaboration with PT to increase functional endurance in preparation of self care activities and functional transfers. Patient required total assist for sitting balance; following ~ 10% commands; limited by impaired ROM/strength/stiffness & impaired cognition.   Pt with

## 2020-01-15 NOTE — PROGRESS NOTES
CRITICAL CARE ATTENDING     CC: LGIB    SIGNIFICANT 24 HOUR EVENTS/NEW COMPLAINTS:  1/14/20 - transferred from [facility] to SICU last night for management of LGIB;  Received multiple transfusions overnight (5 u rbcs, 2 ffp) and FEIBA;    1/15/20 - small amount of anorectal bleeding last night; transfused one unit rbcs    PHYSICAL EXAM:  Vitals:    01/15/20 0500 01/15/20 0525 01/15/20 0600 01/15/20 0700   BP: 127/63  (!) 120/54 (!) 119/57   Pulse: 72 71 73 71   Resp: 12  8 12   Temp:       TempSrc:       SpO2: 100% 100% 99% 99%   Weight:  201 lb 14.4 oz (91.6 kg)     Height:           I/O last 3 completed shifts:   In: 2934 [I.V.:2134; Blood:350; IV Piggyback:450]  Out: 8453 [Urine:1045; Emesis/NG output:200; Stool:3]    Neuro - Awake, alert, attentive but disoriented  HEENT - oral mucosa moist  CV - irregular, well-perfused  Pulm - non labored, room air  Abdomen - non distended, non tender ; PEG in epigastrium  Musculoskeletal - bilateral LE SCDs  Skin - multiple wounds on buttocks/heels/  Rectal - large bloody, purulent draining posterior midline wound with ?mass intrarectal  Tubes/drains - PEG, alexandre  Lines - right femoral CVC 1/13    ASSESSMENT/PLAN:  --LGIB due to anorectal abscess vs tumor vs necrotic hemorrhoid   -spoke with gen surg   -planning for EUA today   -IV ATB    --alzheimer's dementia   -home aricept, namenda    --Atrial fibrillation with controlled ventricular rate    -rate control    -hold anticoagulation     --H/o DVT/PE   -s/p IVC filter 2/2019    --COPD   -resume bronchodilators    --coagulopathy d/t OAC/eliquis   -hold   -reversed with FEIBA/FFP    ---Acute blood loss anemia - improved   -Hgb =  7.5 > 6.9 > 8.1  -no active bleeding  -monitor H&H     --multiple pressure wounds   -Continue local wound care (wound care nursing following)    Analgesia/sedation plan: tylenol  Prophylaxis:  SCDs, IVC filter; hold chemoprophylaxis   Code Status: FULL; pall med following  Disposition: ICU - transfer later today if no significant intraop issues      Cynthia Manzo MD, FACS

## 2020-01-15 NOTE — ANESTHESIA PRE PROCEDURE
Kevan Cota MD   1 g at 01/14/20 1700    metronidazole (FLAGYL) 500 mg in NaCl 100 mL IVPB premix  500 mg Intravenous Q8H Kevan Cota MD   Stopped at 01/15/20 0103    mineral oil-hydrophilic petrolatum (HYDROPHOR) ointment   Topical BID Briseida Ramírez MD        And    mineral oil-hydrophilic petrolatum (HYDROPHOR) ointment   Topical TID PRN Briseida Ramírez MD        sodium chloride flush 0.9 % injection 10 mL  10 mL Intravenous 2 times per day Primo Holloway MD   10 mL at 01/14/20 2206    sodium chloride flush 0.9 % injection 10 mL  10 mL Intravenous PRN Primo Holloway MD        magnesium hydroxide (MILK OF MAGNESIA) 400 MG/5ML suspension 30 mL  30 mL Oral Daily PRN Primo Holloway MD        ondansetron (ZOFRAN) injection 4 mg  4 mg Intravenous Q6H PRN Primo Holloway MD        ipratropium-albuterol (DUONEB) nebulizer solution 3 mL  3 mL Inhalation Q4H WA Primo Holloway MD   Stopped at 01/14/20 1000    trospium (SANCTURA) tablet 20 mg  20 mg Oral Nightly Primo Holloway MD        pantoprazole (PROTONIX) injection 40 mg  40 mg Intravenous BID Primo Holloway MD   40 mg at 01/14/20 2206    And    sodium chloride (PF) 0.9 % injection 10 mL  10 mL Intravenous Daily Primo Holloway MD   10 mL at 01/14/20 3953       Allergies:  No Known Allergies    Problem List:    Patient Active Problem List   Diagnosis Code    Macular degeneration H35.30    Severe protein-calorie malnutrition ([address] E43    DVT (deep venous thrombosis) ([facility]) I82.409    Altered mental status R41.82    DINA (acute kidney injury) ([facility] [address] N17.9    A-fib ([facility] [address] I48.91    Friction blisters of sole of right foot S90.821A    Alzheimer's dementia without behavioral disturbance ([address] G30.9, F02.80    Bilateral lower extremity edema R60.0    OAB (overactive bladder) N32.81    Other pulmonary embolism without acute cor pulmonale (HCC) I26.99    Hypernatremia E87.0    Dehydration E86.0    Acute lower gastrointestinal hemorrhage K92.2       Past CMP:   Lab Results   Component Value Date     01/15/2020    K 3.6 01/15/2020    K 4.5 01/14/2020     01/15/2020    CO2 20 01/15/2020    BUN 23 01/15/2020    CREATININE 0.8 01/15/2020    GFRAA >60 01/15/2020    LABGLOM >60 01/15/2020    GLUCOSE 78 01/15/2020    GLUCOSE 84 04/25/2012    PROT 5.2 01/15/2020    CALCIUM 7.9 01/15/2020    BILITOT 0.8 01/15/2020    ALKPHOS 62 01/15/2020    AST 47 01/15/2020    ALT 32 01/15/2020       POC Tests: No results for input(s): POCGLU, POCNA, POCK, POCCL, POCBUN, POCHEMO, POCHCT in the last 72 hours. Coags:   Lab Results   Component Value Date    PROTIME 16.8 01/14/2020    INR 1.5 01/14/2020    APTT 36.9 01/03/2020       HCG (If Applicable): No results found for: PREGTESTUR, PREGSERUM, HCG, HCGQUANT     ABGs: No results found for: PHART, PO2ART, GKR6KHA, ZLS3TXO, BEART, P2IQUAHG     Type & Screen (If Applicable):  No results found for: LABABO, LABRH     EKG 1/14/2020  Narrative & Impression   Atrial fibrillation  Low voltage QRS  Abnormal ECG  When compared with ECG of 25-DEC-2019 16:55,  No significant change was found     ECHO 8/21/2019   Summary   No significant valvular abnormalities. Micro-bubble contrast injected to enhance left ventricular visualization. No evidence of left ventricular mass or thrombus noted. Normal left ventricle size. Mild concentric left ventricular hypertrophy. proximal septal thickening/hypertrophy. Stage I diastolic dysfunction. Ejection fraction is visually estimated at 55-60%. CT of Chest 12/27/2019  Impression       Right lower lobe infiltrate is likely infectious pneumonia. Follow-up   recommended to exclude malignancy.          Anesthesia Evaluation  Patient summary reviewed and Nursing notes reviewed no history of anesthetic complications:   Airway: Mallampati: III  TM distance: >3 FB     Mouth opening: > = 3 FB Dental:    (+) edentulous      Pulmonary:   (+) decreased breath sounds, Cardiovascular:    (+) dysrhythmias: atrial fibrillation,       ECG reviewed  Rhythm: irregular  Rate: normal  Echocardiogram reviewed         Beta Blocker:  Not on Beta Blocker         Neuro/Psych:   (+) psychiatric history (Alzheimer disease):             ROS comment: Metabolic syndrome  Altered mental status GI/Hepatic/Renal:            ROS comment: GI Bleed. Endo/Other:    (+) blood dyscrasia: anemia and anticoagulation therapy:., .                 Abdominal:           Vascular:   + DVT, PE.        ROS comment: IVC filter. Anesthesia Plan      MAC     ASA 3       Induction: intravenous. Anesthetic plan and risks discussed with patient (Pt oriented to self only, information obtained from chart and RN). Plan discussed with CRNA and attending.                 Netta Lanes, RN   1/15/2020

## 2020-01-15 NOTE — PROGRESS NOTES
GENERAL SURGERY  DAILY PROGRESS NOTE  1/15/2020    Subjective:  2 BM since afternoon, required 1 U pRBC this AM, no hypotension, remains confused, rectal abscess vs thrombosed hemorrhoid ruptured yesterday    Objective:  /63   Pulse 71   Temp 97.9 °F (36.6 °C) (Temporal)   Resp 12   Ht 6' 3\" (1.905 m)   Wt 201 lb 14.4 oz (91.6 kg)   SpO2 100%   BMI 25.24 kg/m²     GENERAL:  Laying in bed, no apparent distress, confused  LUNGS:  No increased work of breathing, no cyanosis  CARDIOVASCULAR:  Extremities warm and well perfused,   ABDOMEN:  Soft, no tenderness, non distended,       SKIN: Warm and dry    Assessment/Plan:  80 y.o. male with ruptured thrombosed hemorrhoid vs perirectal abscess, diverticular bleed    -rectal exam under anesthesia today with EGD to further evaluate bleeding source  -enema prior to OR to clean out rectal vault  -NPO until post procedure  -transfuse PRN for Hgb <7, received 1 U pRBC this AM, but otherwise has been stable post initial resuscitation  -appreciate ICU care    Electronically signed by Philly Johnson MD on 1/15/2020 at 6:10 AM

## 2020-01-15 NOTE — PROGRESS NOTES
Procedure scheduled as \"on-call\" but physician would like [phone]. Order for one hour pre-procedure. Therefore the first tap water enema was given at 0645. Up to three enemas to be performed.

## 2020-01-15 NOTE — PROGRESS NOTES
Hospitalist Progress Note    CC: Acute lower gastrointestinal hemorrhage    Hospital course:    Admitted from Nursing home with Painless Rectal Bleeding X 1 Day. On Anticoagulation for Hx of Atrial Fibrillation as well as hx of VTE  Lavages of PEG Tube were Negative for active Upper GI Bleed  Given FEIBA / PRBC Transfusions  Transferred [facility] to [facility] ([facility] for Possible need of Embolization to address Bleeding       Admit date: 1/13/2020  Days in hospital:  2    24 Hour Events:   -- Admitted. See above     Subjective:   -- Patient seen and examined, chart reviewed   -- Remains admitted to SICU at this time   -- Evaluated following Endoscopy and Rectal Exam under Sedation   -- Very tired following procedures, Noted to have Fistular Disease  -- Blood Loss stable at this time     ROS:   Pertinent items are noted in HPI.      Objective:  Patient Vitals for the past 24 hrs:   BP Temp Temp src Pulse Resp SpO2 Height Weight   01/15/20 0700 (!) 119/57 -- -- 71 12 99 % -- --   01/15/20 0600 (!) 120/54 -- -- 73 8 99 % -- --   01/15/20 0525 -- -- -- 71 -- 100 % -- 201 lb 14.4 oz (91.6 kg)   01/15/20 0500 127/63 -- -- 72 12 100 % -- --   01/15/20 0400 116/81 97.9 °F (36.6 °C) Temporal 68 10 98 % -- --   01/15/20 0330 116/68 -- -- 67 -- 100 % -- --   01/15/20 0315 111/62 -- -- 67 -- 100 % -- --   01/15/20 0300 (!) 113/51 -- -- 68 12 100 % -- --   01/15/20 0245 (!) 117/57 -- -- 69 -- 100 % -- --   01/15/20 0230 (!) 116/53 98 °F (36.7 °C) -- 68 14 100 % -- --   01/15/20 0215 (!) 117/53 98 °F (36.7 °C) Temporal 63 12 97 % -- --   01/15/20 0200 (!) 117/53 -- -- 71 12 97 % -- --   01/15/20 0100 (!) 112/58 -- -- 70 12 100 % -- --   01/15/20 0000 (!) 113/57 98 °F (36.7 °C) Temporal 70 14 100 % -- --   01/14/20 2300 (!) 106/49 -- -- 71 10 98 % -- --   01/14/20 2200 (!) 108/54 -- -- 66 13 100 % -- --   01/14/20 2100 (!) 98/52 -- -- 67 12 100 % -- --   01/14/20 2000 (!) 115/49 97.6 °F (36.4 Lower GI Bleed  -- Etiology undetermined at this time but certainly made worse in the setting of chronic anticoagulation related to his Hx of VTE and Atrial Fibrillation   EGD without source, but with anorectal fistula ?  If source  Continue Rate Control for Atrial Fibrillation, agree with hold A/C  Hx of COPD -- Currently stable     Prognosis:  Guarded    Code status:  Full Code is documented     DVT prophylaxis: [] Lovenox  [] SQ Heparin  [] SCDs  warfarin/oral direct thrombin inhibitor [] Encourage ambulation  GI prophylaxis: [x] PPI/[id number]  [] not indicated  Diet:  Diet NPO Effective Now    Disposition:  [] Home  [] Home with home health [] Rehab [] Psych [] SNF  [] LTAC  [] Long term nursing home or group home [] Transfer to ICU  [] Transfer to PCU [] Other:     Medications:  Scheduled Meds:   donepezil  10 mg Oral Nightly    memantine  10 mg Oral BID    cefTRIAXone (ROCEPHIN) IV  1 g Intravenous Q24H    metroNIDAZOLE  500 mg Intravenous Q8H    mineral oil-hydrophilic petrolatum   Topical BID    sodium chloride flush  10 mL Intravenous 2 times per day    ipratropium-albuterol  3 mL Inhalation Q4H WA    trospium  20 mg Oral Nightly    pantoprazole  40 mg Intravenous BID    And    sodium chloride (PF)  10 mL Intravenous Daily       PRN Meds:  mineral oil-hydrophilic petrolatum **AND** mineral oil-hydrophilic petrolatum, sodium chloride flush, magnesium hydroxide, ondansetron    IV:   sodium chloride 100 mL/hr at 01/14/20 0545         Intake/Output Summary (Last 24 hours) at 1/15/2020 0713  Last data filed at 1/15/2020 0700  Gross per 24 hour   Intake 2934 ml   Output 1248 ml   Net 1686 ml       Results:  CBC:   Recent Labs     01/14/20  1310 01/15/20  0010 01/15/20  0532   WBC 12.5* 11.5 11.4  11.3   HGB 7.5* 6.9* 8.1*  8.1*   HCT 22.7* 21.2* 24.9*  24.8*   MCV 87.6 88.3 88.6  88.3    272 294  288     BMP:   Recent Labs     01/13/20  1444 01/14/20  0545 01/15/20  0532    139 141   K

## 2020-01-16 NOTE — ANESTHESIA PRE PROCEDURE
(85.6 kg)     Body mass index is 25.04 kg/m². CBC:   Lab Results   Component Value Date    WBC 9.7 01/16/2020    RBC 2.97 01/16/2020    HGB 8.6 01/16/2020    HCT 26.5 01/16/2020    MCV 89.2 01/16/2020    RDW 15.1 01/16/2020     01/16/2020       CMP:   Lab Results   Component Value Date     01/16/2020    K 3.4 01/16/2020    K 4.5 01/14/2020     01/16/2020    CO2 23 01/16/2020    BUN 17 01/16/2020    CREATININE 0.8 01/16/2020    GFRAA >60 01/16/2020    LABGLOM >60 01/16/2020    GLUCOSE 101 01/16/2020    GLUCOSE 84 04/25/2012    PROT 5.2 01/16/2020    CALCIUM 7.9 01/16/2020    BILITOT 0.5 01/16/2020    ALKPHOS 59 01/16/2020    AST 33 01/16/2020    ALT 25 01/16/2020       POC Tests: No results for input(s): POCGLU, POCNA, POCK, POCCL, POCBUN, POCHEMO, POCHCT in the last 72 hours.     Coags:   Lab Results   Component Value Date    PROTIME 16.8 01/14/2020    INR 1.5 01/14/2020    APTT 36.9 01/03/2020       HCG (If Applicable): No results found for: PREGTESTUR, PREGSERUM, HCG, HCGQUANT     ABGs: No results found for: PHART, PO2ART, QSE2BXA, ZSF1NUG, BEART, K2YOSWLY     Type & Screen (If Applicable):  No results found for: [facility]    EKG 1/14/20 1/14/2020  3:35 PM - Juan R, Mhy Incoming Ekg Results From [facility]     Component Value Ref Range & Units Status Collected Lab   Ventricular Rate 72  BPM Final 01/14/2020  1:15 AM [facility]   Atrial Rate 72  BPM Final 01/14/2020  1:15 AM [facility]   QRS Duration 102  ms Final 01/14/2020  1:15 AM [facility]   Q-T Interval 404  ms Final 01/14/2020  1:15 AM [facility]   QTc Calculation (Bazett) 442  ms Final 01/14/2020  1:15 AM [facility]   R [place] -29  degrees Final 01/14/2020  1:15 AM [facility]   T [place] 4  degrees Final 01/14/2020  1:15 AM [facility]   Testing Performed By     Lab - Abbreviation Name Director Address Valid Date Range   360-[facility] MUSE Unknown Unknown 04/18/16 0721-Present   Narrative & Impression     Atrial fibrillation  Low voltage QRS  Abnormal ECG  When compared with ECG of 25-DEC-2019 16:55,  No significant change was found     Confirmed by Lynn Penn ([id number]) on 1/14/2020 3:35:48 PM     ECHO 8/21/2019   Summary   No significant valvular abnormalities.   Micro-bubble contrast injected to enhance left ventricular visualization.   No evidence of left ventricular mass or thrombus noted.   Normal left ventricle size.   Mild concentric left ventricular hypertrophy.   proximal septal thickening/hypertrophy.   Stage I diastolic dysfunction.   Ejection fraction is visually estimated at 55-60%. CT of Chest 12/27/2019  Impression       Right lower lobe infiltrate is likely infectious pneumonia. Follow-up   recommended to exclude malignancy.          Anesthesia Evaluation  Patient summary reviewed and Nursing notes reviewed no history of anesthetic complications:   Airway: Mallampati: III  TM distance: <3 FB   Neck ROM: limited  Mouth opening: < 3 FB Dental:    (+) upper dentures      Pulmonary: breath sounds clear to auscultation  (+) decreased breath sounds,                             Cardiovascular:    (+) dysrhythmias: atrial fibrillation, peripheral edema,       ECG reviewed  Rhythm: irregular  Rate: normal           Beta Blocker:  Not on Beta Blocker         Neuro/Psych:   (+) psychiatric history:             ROS comment: Alzheimer's Disease GI/Hepatic/Renal:            ROS comment: GI Bleed on this admission  D/t anorectal abscess vs tumor vs necrotic hemorrhoid. Endo/Other:    (+) blood dyscrasia: anticoagulation therapy and anemia:., .                 Abdominal:           Vascular:   + DVT, PE.        ROS comment: Hx ivc filter. Anesthesia Plan      general     ASA 3     (R Fem TLC  Valdovinos catheter)  Induction: intravenous. BIS  MIPS: Postoperative opioids intended and Prophylactic antiemetics administered. Anesthetic plan and risks discussed with patient.     Use of blood products discussed with patient whom consented to blood products. Plan discussed with CRNA. Iris Rhoades RN   1/16/2020      Pt seen, examined, chart reviewed, plan discussed.   Colten Valle  1/17/2020  12:27 PM

## 2020-01-17 NOTE — BRIEF OP NOTE
Brief Postoperative Note  ______________________________________________________________    Patient: Jennifer Malik  YOB: 1932  MRN: [id number]  Date of Procedure: 1/17/2020    Pre-Op Diagnosis: Rectal Fistula    Post-Op Diagnosis: Same       Procedure(s):  LAPAROSCOPIC DIVERTING COLOSTOMY    Anesthesia: General    Surgeon(s):  Elizabeth Scott MD    Assistant: Rahul Sutton MD    Estimated Blood Loss (mL): less than 50     Complications: None    Specimens:   * No specimens in log *    Implants:  * No implants in log *      Drains:   Gastrostomy/Enterostomy/Jejunostomy Percutaneous Endoscopic Gastrostomy (PEG) LUQ 1 20 fr (Active)   Drainage Appearance None 1/13/2020 10:20 PM   Catheter Position (cm marking) 2.5 cm 1/2/2020  8:17 AM   Drain Status Open to gravity drainage 1/14/2020  8:00 PM   Surrounding Skin Intact 1/14/2020  8:00 PM   Dressing Status Dry; Intact 1/14/2020  8:00 PM   Dressing Type Split gauze 1/14/2020  8:00 PM   Dressing Change Due 01/03/20 1/2/2020  3:28 PM   Rate/Schedule 55 mL/hr 1/3/2020 10:49 AM   Free Water Flush (mL) 150 mL 1/17/2020  6:00 AM   Free Water Rate 175q4 1/2/2020  8:00 PM   Output (mL) 0 ml 1/16/2020  2:59 PM   Tube Placement Verified Yes 1/2/2020  2:00 PM   Residual Volume (ml) 0 ml 1/3/2020 10:49 AM       Urethral Catheter (Active)   Catheter Indications Need for fluid management in critically ill patients in a critical care setting not able to be managed by other means such as BSC with hat, bedpan, urinal, condom catheter, or short term intermittent urethral catherization 1/17/2020 12:00 PM   Site Assessment No urethral drainage 1/17/2020 12:00 PM   Urine Color Makayla 1/17/2020 12:00 PM   Urine Appearance Clear 1/17/2020 12:00 PM   Output (mL) 25 mL 1/17/2020 12:00 PM       [REMOVED] Urethral Catheter (Removed)   Site Assessment No urethral drainage 1/3/2020 10:49 AM   Urine Color Yellow 1/3/2020 10:49 AM   Urine Appearance Clear 1/2/2020  9:00 PM   Urine Odor

## 2020-01-17 NOTE — PROGRESS NOTES
CRITICAL CARE ATTENDING     CC: LGIB    SIGNIFICANT 24 HOUR EVENTS/NEW COMPLAINTS:  1/14/20 - transferred from [facility] to SICU last night for management of LGIB;  Received multiple transfusions overnight (5 u rbcs, 2 ffp) and FEIBA;    1/15/20 - small amount of anorectal bleeding last night; transfused one unit rbcs  1/16 - went to OR yesterday; EGD done and no bleeding; EUA - biopsy of large anal fistula; no bleeding reported  1/17 - scheduled for diverting ostomy today, had some rectal bleeding overnight    PHYSICAL EXAM:  Vitals:    01/16/20 2200 01/16/20 2300 01/17/20 0000 01/17/20 0200   BP: 104/68 106/79 (!) 102/52 (!) 102/53   Pulse: 77 74 71 74   Resp: 29 22 14 9   Temp: 98 °F (36.7 °C)  97.8 °F (36.6 °C) 98.3 °F (36.8 °C)   TempSrc: Oral  Oral Oral   SpO2: 100% 99% 100% 100%   Weight:       Height:           I/O last 3 completed shifts:   In: 1467 [I.V.:1367; IV Piggyback:100]  Out: 0 [Urine:992]    Neuro - Awake, alert, attentive but disoriented  HEENT - oral mucosa moist  CV - irregular/afib, well-perfused  Pulm - non labored, room air  Abdomen - non distended, non tender ; PEG in epigastrium  Musculoskeletal - bilateral LE SCDs  Skin - multiple wounds on buttocks/heels/  Anus- loose brown stool purulent draining posterior midline wound  Tubes/drains - PEG, alexandre  Lines - right femoral CVC 1/13    ASSESSMENT/PLAN:  --LGIB due to anorectal abscess vs tumor vs necrotic hemorrhoid   -awaiting biopsies   -s/p laparoscopic diverting colostomy   -IV ATB    --alzheimer's dementia   -home aricept, namenda    --Atrial fibrillation with controlled ventricular rate    -rate control    -hold anticoagulation     --H/o DVT/PE   -s/p IVC filter 2/2019    --COPD   -bronchodilators    --coagulopathy d/t OAC/eliquis   -hold   -reversed with FEIBA/FFP    ---Acute blood loss anemia - improved   -Hgb =  7.5 > 6.9 > 8.1 > 8.7 > 8.2 > 8.0 > 7.9 >   -no active bleeding  -monitor H&H     --multiple pressure wounds   -Continue local wound care (wound care nursing following)    Analgesia/sedation plan: tylenol  Prophylaxis:  SCDs, IVC filter; hold chemoprophylaxis   Code Status: FULL; pall med following  Disposition: Kayden Navarrete MD, FACS

## 2020-01-17 NOTE — PROGRESS NOTES
Pt seen. No new events. Plan is for diverting colostomy to help heal large anal fistula. Goals remain the same; full code; return back to SOV once stable post op. No immediate needs at this time for Palliative Medicine. Will follow along.

## 2020-01-17 NOTE — PROGRESS NOTES
Axillary 79 17 100 % --   20 1500 (!) 105/54 -- -- 77 18 100 % --   20 1400 (!) 119/54 98.4 °F (36.9 °C) Axillary 85 13 100 % --   20 1300 (!) 94/42 -- -- 79 15 94 % --   20 1200 127/87 98.7 °F (37.1 °C) Axillary 82 17 100 % --   20 1115 -- -- -- -- 21 98 % --   20 1000 130/77 98.1 °F (36.7 °C) Axillary 77 17 99 % --   20 0900 (!) 121/53 -- -- 72 13 100 % --   20 0800 102/63 98.3 °F (36.8 °C) Axillary 71 16 99 % --     Temp (24hrs), Av.2 °F (36.8 °C), Min:97.8 °F (36.6 °C), Max:98.7 °F (37.1 °C)      Intake/Output Summary (Last 24 hours) at 2020 0714  Last data filed at 2020 0600  Gross per 24 hour   Intake 2377 ml   Output 922 ml   Net 1455 ml       Gen: Tired appearing individual seen resting in SICU-4. He is sleeping soundly   HEENT: NC/AT, dry mucous membranes, no oropharyngeal erythema or exudate  Neck: supple, trachea midline, no anterior cervical or SC LAD  Heart:  Normal s1/s2, RRR, no murmurs, gallops, or rubs. no leg edema  Lungs:  Clear to auscultation  bilaterally, no wheeze  Abd: diminished bowel sounds present, soft, nontender, nondistended, no masses  Extrem:  No clubbing, cyanosis,  no edema  Skin: no rashes or lesions  Psych: Sleeping, Left to rest   Neuro: Sleeping, left to rest      Assessment:    Principal Problem:    Acute lower gastrointestinal hemorrhage  Active Problems:    Severe protein-calorie malnutrition (HCC)    A-fib (HCC)    Alzheimer's dementia without behavioral disturbance ([facility] [address]    Palliative care by specialist  Resolved Problems:    * No resolved hospital problems. 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  If source  Planned for OR for Diverting Colostomy and completed on HD-4  Continue Rate Control for Atrial Fibrillation,  104 108*   CO2 20* 23 22   PHOS 2.9 2.8 2.4*   BUN 23 17 12   CREATININE 0.8 0.8 0.7     Mag: No results for input(s): MAG in the last 72 hours. Phos:   Lab Results   Component Value Date    PHOS 2.4 (L) 01/17/2020     No components found for: GLU    LIVER PROFILE:   Recent Labs     01/15/20  0532 01/16/20  0535 01/17/20  0535   AST 47* 33 25   ALT 32 25 20   BILITOT 0.8 0.5 0.5   ALKPHOS 62 59 54     PT/INR:   No results for input(s): PROTIME, INR in the last 72 hours. APTT: No results for input(s): APTT in the last 72 hours. UA:No results for input(s): NITRITE, COLORU, PHUR, LABCAST, WBCUA, RBCUA, MUCUS, TRICHOMONAS, YEAST, BACTERIA, CLARITYU, SPECGRAV, LEUKOCYTESUR, UROBILINOGEN, BILIRUBINUR, BLOODU, GLUCOSEU, AMORPHOUS in the last 72 hours.     Invalid input(s): Remington Shah input(s): ABG  Lab Results   Component Value Date    CALCIUM 7.8 (L) 01/17/2020    PHOS 2.4 (L) 01/17/2020           Electronically signed by Petrona Arroyo DO on 1/17/2020 at 7:14 AM

## 2020-01-17 NOTE — PLAN OF CARE
Problem: Risk for Impaired Skin Integrity  Goal: Tissue integrity - skin and mucous membranes  Description  Structural intactness and normal physiological function of skin and  mucous membranes.   Outcome: Met This Shift     Problem: Falls - Risk of:  Goal: Will remain free from falls  Description  Will remain free from falls  Outcome: Met This Shift     Problem: Falls - Risk of:  Goal: Absence of physical injury  Description  Absence of physical injury  Outcome: Met This Shift     Problem: Skin Integrity:  Goal: Absence of new skin breakdown  Description  Absence of new skin breakdown  Outcome: Met This Shift     Problem: Aspiration:  Goal: Absence of aspiration  Description  Absence of aspiration  Outcome: Met This Shift

## 2020-01-17 NOTE — PROGRESS NOTES
123/65 -- -- 79 11 100 % --   20 0400 124/76 98.4 °F (36.9 °C) Axillary 71 17 100 % --   20 0300 111/73 -- -- 68 10 99 % --   20 0200 124/73 98.4 °F (36.9 °C) Axillary 72 16 99 % --   20 0100 124/70 -- -- 72 23 100 % --   20 0000 121/69 98.6 °F (37 °C) Axillary 68 10 100 % --   01/15/20 2300 111/65 -- -- 72 10 100 % --   01/15/20 2200 111/66 98.6 °F (37 °C) Temporal 76 8 100 % --     Temp (24hrs), Av.4 °F (36.9 °C), Min:97.9 °F (36.6 °C), Max:98.7 °F (37.1 °C)      Intake/Output Summary (Last 24 hours) at 2020  Last data filed at 2020  Gross per 24 hour   Intake 1614 ml   Output 1022 ml   Net 592 ml       Gen: Tired appearing individual seen resting in SICU-4. Seems confused   HEENT: NC/AT, dry mucous membranes, no oropharyngeal erythema or exudate  Neck: supple, trachea midline, no anterior cervical or SC LAD  Heart:  Normal s1/s2, RRR, no murmurs, gallops, or rubs. no leg edema  Lungs:  Clear to auscultation  bilaterally, no wheeze  Abd: bowel sounds present, soft, nontender, nondistended, no masses  Extrem:  No clubbing, cyanosis,  no edema  Skin: no rashes or lesions  Psych: A & O x3  Neuro: grossly intact, moves all four extremities. Assessment:    Principal Problem:    Acute lower gastrointestinal hemorrhage  Active Problems:    Severe protein-calorie malnutrition ([address]    A-fib (HCC)    Alzheimer's dementia without behavioral disturbance ([facility] [address]    Palliative care by specialist  Resolved Problems:    * No resolved hospital problems. 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  If source  Planned for OR for Diverting Colostomy   Continue Rate Control for Atrial Fibrillation, agree with hold A/C  Pressure Ulcers Present on Admission -- Multiple, Wound Care Right Buttock Stage 2   Left Hip Stage 2   Left Heel Unstageable   Coccyx Stage 3                Hx of COPD -- Currently stable     Prognosis:  Guarded    Code status:  Full Code is documented     DVT prophylaxis: [] Lovenox  [] SQ Heparin  [] SCDs  warfarin/oral direct thrombin inhibitor [] Encourage ambulation  GI prophylaxis: [x] PPI/[id number]  [] not indicated  Diet:  Diet NPO Effective Now Exceptions are: Sips with Meds    Disposition:  [] Home  [] Home with home health [] Rehab [] Psych [] SNF  [] LTAC  [] Long term nursing home or group home [] Transfer to ICU  [] Transfer to PCU [] Other:     Medications:  Scheduled Meds:   cefTRIAXone (ROCEPHIN) IV  2 g Intravenous Q24H    donepezil  10 mg Oral Nightly    memantine  10 mg Oral BID    metroNIDAZOLE  500 mg Intravenous Q8H    mineral oil-hydrophilic petrolatum   Topical BID    sodium chloride flush  10 mL Intravenous 2 times per day    ipratropium-albuterol  3 mL Inhalation Q4H WA    trospium  20 mg Oral Nightly    pantoprazole  40 mg Intravenous BID    And    sodium chloride (PF)  10 mL Intravenous Daily       PRN Meds:  acetaminophen, mineral oil-hydrophilic petrolatum **AND** mineral oil-hydrophilic petrolatum, sodium chloride flush, magnesium hydroxide, ondansetron    IV:   dextrose 5% and 0.45% NaCl with KCl 20 mEq 100 mL/hr at 01/16/20 1907         Intake/Output Summary (Last 24 hours) at 1/16/2020 2157  Last data filed at 1/16/2020 2100  Gross per 24 hour   Intake 1614 ml   Output 1022 ml   Net 592 ml       Results:  CBC:   Recent Labs     01/16/20  0535 01/16/20  1215 01/16/20  1848   WBC 9.3 9.7 8.7   HGB 8.0* 8.6* 8.4*   HCT 25.0* 26.5* 25.8*   MCV 88.7 89.2 89.0    328 319     BMP:   Recent Labs     01/14/20  0545 01/15/20  0532 01/16/20  0535    141 137   K 4.5 3.6 3.4*    105 104   CO2 24 20* 23   PHOS  --  2.9 2.8   BUN 28* 23 17   CREATININE 0.9 0.8 0.8     Mag: No results for input(s): MAG in the last 72 hours.  Phos:   Lab Results   Component Value Date    PHOS 2.8 01/16/2020     No components found for: GLU    LIVER PROFILE:   Recent Labs     01/14/20  0545 01/15/20  0532 01/16/20  0535   AST 66* 47* 33   ALT 39 32 25   BILITOT 0.5 0.8 0.5   ALKPHOS 60 62 59     PT/INR:   Recent Labs     01/14/20  0046   PROTIME 16.8*   INR 1.5     APTT: No results for input(s): APTT in the last 72 hours. UA:No results for input(s): NITRITE, COLORU, PHUR, LABCAST, WBCUA, RBCUA, MUCUS, TRICHOMONAS, YEAST, BACTERIA, CLARITYU, SPECGRAV, LEUKOCYTESUR, UROBILINOGEN, BILIRUBINUR, BLOODU, GLUCOSEU, AMORPHOUS in the last 72 hours.     Invalid input(s): Mi Ho input(s): ABG  Lab Results   Component Value Date    CALCIUM 7.9 (L) 01/16/2020    PHOS 2.8 01/16/2020           Electronically signed by Frank Summers DO on 1/16/2020 at 9:57 PM

## 2020-01-18 NOTE — PROGRESS NOTES
GENERAL SURGERY  DAILY PROGRESS NOTE  1/18/2020    Subjective:  No issues overnight. S/p lap diverting colostomy yesterday. hgb stable. Objective:  BP (!) 104/49   Pulse 71   Temp 97.3 °F (36.3 °C) (Axillary)   Resp 19   Ht 6' 3\" (1.905 m)   Wt 217 lb 6 oz (98.6 kg)   SpO2 98%   BMI 27.17 kg/m²     GENERAL:  Laying in bed, no apparent distress, confused  LUNGS:  No increased work of breathing, no cyanosis  CARDIOVASCULAR:  Extremities warm and well perfused,   ABDOMEN:  Soft, no tenderness, non distended, ostomy pink and patent no stool or gas. 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 male with blood per rectum 2/2 large anorectal fistula s/p diverting colostomy POD 1    -ok for clears vs TF  -transfuse PRN for Hgb <7,  -appreciate ICU care, ok for transfer       Electronically signed by Lucille Nguyen MD on 1/18/2020 at 8:53 AM     Attending Note:    Patient seen/examined 1/18/2020. Agree with above assessment and plan. Await bowel function.

## 2020-01-18 NOTE — PLAN OF CARE
Problem: Risk for Impaired Skin Integrity  Goal: Tissue integrity - skin and mucous membranes  Description  Structural intactness and normal physiological function of skin and  mucous membranes.   Outcome: Not Met This Shift     Problem: Falls - Risk of:  Goal: Will remain free from falls  Description  Will remain free from falls  Outcome: Met This Shift  Goal: Absence of physical injury  Description  Absence of physical injury  Outcome: Met This Shift

## 2020-01-18 NOTE — PROGRESS NOTES
`                                                      Hospitalist Progress Note    CC: Acute lower gastrointestinal hemorrhage    Hospital course:    Admitted from Nursing home with Painless Rectal Bleeding X 1 Day. On Anticoagulation for Hx of Atrial Fibrillation as well as hx of VTE  Lavages of PEG Tube were Negative for active Upper GI Bleed  Given FEIBA / PRBC Transfusions  Transferred from [facility] to [facility] ([facility]) for Possible need of Embolization to address Bleeding   Taken to OR for EGD / Rectal Exam Under Anesthesia --> Anorectal Fistula, Biopsies taken. Taken to OR on HD-4 for Laparoscopic Diverting Colostomy   Transferred from SICU to Telemetry Floor on HD-5      Admit date: 1/13/2020  Days in hospital:  5    24 Hour Events:   No acute issues reported / recorded     Subjective:   -- Patient seen and examined, chart reviewed   -- Transferred from SICU to Telemetry Floor   -- POD-1 SP Laparoscopic Diverting Colostomy   -- Oliguric overnight in SICU and with Hypotension prior to Transfer, Fluid Responsive   -- Transfused 1U PRBC by SICU prior to Transfer  -- Remains with some Bleeding from Fistula    ROS:   Pertinent items are noted in HPI.      Objective:  Patient Vitals for the past 24 hrs:   BP Temp Temp src Pulse Resp SpO2 Weight   01/18/20 1257 (!) 110/50 97.6 °F (36.4 °C) Temporal 72 18 100 % --   01/18/20 1200 (!) 98/43 97.8 °F (36.6 °C) Axillary 67 17 98 % --   01/18/20 1145 (!) 96/40 97.8 °F (36.6 °C) Axillary 71 16 98 % --   01/18/20 1100 (!) 104/49 -- -- 73 14 99 % --   01/18/20 1000 (!) 105/52 97.3 °F (36.3 °C) Axillary 75 19 98 % --   01/18/20 0945 (!) 78/41 97.3 °F (36.3 °C) Axillary 70 17 100 % --   01/18/20 0930 (!) 106/46 97.1 °F (36.2 °C) Axillary 68 15 100 % --   01/18/20 0915 (!) 107/47 96.8 °F (36 °C) Axillary 76 17 100 % --   01/18/20 0900 (!) 103/48 -- -- 65 18 99 % --   01/18/20 0857 (!) 103/44 97.3 °F (36.3 °C) Axillary 66 17 99 % --   01/18/20 0832 (!) 104/49 97.3 °F (36.3 °C) Axillary 71 19 98 % --   20 0815 -- -- -- -- -- 100 % --   20 0800 (!) 108/57 97.3 °F (36.3 °C) Axillary 60 19 99 % --   20 0700 (!) 97/54 -- -- 64 20 100 % --   20 0600 (!) 112/55 98.5 °F (36.9 °C) Axillary 70 21 100 % 217 lb 6 oz (98.6 kg)   20 0500 (!) 98/50 -- -- 62 18 100 % --   20 0400 (!) 93/47 98.1 °F (36.7 °C) Axillary 57 23 99 % --   20 0300 (!) 94/46 -- -- 59 15 100 % --   20 0200 (!) 95/45 98.3 °F (36.8 °C) Axillary 63 22 100 % --   20 0100 (!) 85/36 -- -- 58 15 100 % --   20 0000 (!) 85/36 98 °F (36.7 °C) Axillary 58 16 100 % --   20 2300 (!) 88/40 -- -- 69 12 99 % --   20 2200 (!) 88/36 98.2 °F (36.8 °C) Axillary 68 13 100 % --   20 2100 (!) 96/41 -- -- 62 16 100 % --   20 -- -- -- -- -- 99 % --   20 2000 (!) 91/40 97.8 °F (36.6 °C) Axillary 59 12 98 % --   20 1900 (!) 99/49 -- -- 75 15 100 % --   20 1800 (!) 92/43 98 °F (36.7 °C) Axillary 58 16 100 % --   20 1700 (!) 104/52 -- -- 59 14 100 % --     Temp (24hrs), Av.7 °F (36.5 °C), Min:96.8 °F (36 °C), Max:98.5 °F (36.9 °C)      Intake/Output Summary (Last 24 hours) at 2020 1651  Last data filed at 2020 1437  Gross per 24 hour   Intake 4718 ml   Output 572 ml   Net 4146 ml       Gen: Awake, Confused about location. Pleasantly conversant   HEENT: NC/AT, dry mucous membranes, no oropharyngeal erythema or exudate  Neck: supple, trachea midline, no anterior cervical or SC LAD  Heart:  Normal s1/s2, RRR, no murmurs, gallops, or rubs. no leg edema  Lungs:  Clear to auscultation  bilaterally, no wheeze  Abd: diminished bowel sounds present, soft, nontender, nondistended, no masses.  + PEG  New Ostomy Present that is Pink   Extrem:  No clubbing, cyanosis,  no edema  Skin: no rashes or lesions  Psych: Sleeping, Left to rest   Neuro: Sleeping, left to rest      Assessment:    Principal Problem:    Acute lower gastrointestinal hemorrhage  Active Problems: Severe protein-calorie malnutrition ([facility] [address]    A-fib (HCC)    Alzheimer's dementia without behavioral disturbance ([facility] [address]    Palliative care by specialist  Resolved Problems:    * No resolved hospital problems. 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 If source  Planned for OR for Diverting Colostomy and completed on HD-4  Continue Rate Control for Atrial Fibrillation, agree with hold A/C  Pressure Ulcers Present on Admission -- Multiple, Wound Care    Right Buttock Stage 2   Left Hip Stage 2   Left Heel Unstageable   Coccyx Stage 3  Hx of COPD -- Currently stable   Dispo Planning -- Return to SOV when stable     Prognosis:  Guarded    Code status:  Full Code is documented     DVT prophylaxis: [] Lovenox  [] SQ Heparin  [] SCDs  warfarin/oral direct thrombin inhibitor [] Encourage ambulation  GI prophylaxis: [x] PPI/D3ferptfa  [] not indicated  Diet:  DIET TUBE FEED CONTINUOUS/CYCLIC NPO;  Immune Enhancing; Gastrostomy; Continuous; 15; 55; 24; Exceptions are: Sips with Meds    Disposition:  [] Home  [] Home with home health [] Rehab [] Psych [] SNF  [] LTAC  [] Long term nursing home or group home [] Transfer to ICU  [] Transfer to PCU [] Other:     Medications:  Scheduled Meds:   cefTRIAXone (ROCEPHIN) IV  2 g Intravenous Q24H    donepezil  10 mg Oral Nightly    memantine  10 mg Oral BID    metroNIDAZOLE  500 mg Intravenous Q8H    mineral oil-hydrophilic petrolatum   Topical BID    sodium chloride flush  10 mL Intravenous 2 times per day    ipratropium-albuterol  3 mL Inhalation Q4H WA    trospium  20 mg Oral Nightly    pantoprazole  40 mg Intravenous BID    And    sodium chloride (PF)  10 mL Intravenous Daily       PRN Meds:  acetaminophen, mineral oil-hydrophilic petrolatum **AND** mineral oil-hydrophilic petrolatum, sodium chloride flush, magnesium hydroxide, ondansetron    IV:   dextrose 5% and 0.45% NaCl with KCl 20 mEq 100 mL/hr at 01/18/20 1121         Intake/Output Summary (Last 24 hours) at 1/18/2020 1651  Last data filed at 1/18/2020 1437  Gross per 24 hour   Intake 4718 ml   Output 572 ml   Net 4146 ml       Results:  CBC:   Recent Labs     01/17/20  0025 01/17/20  0535  01/18/20  0010 01/18/20  0550 01/18/20  1107   WBC 8.4 7.8  --   --  9.3  --    HGB 7.9* 7.8*   < > 7.7* 7.7* 8.6*   HCT 24.8* 24.4*   < > 24.4* 24.2* 26.9*   MCV 89.5 90.0  --   --  91.3  --     305  --   --  330  --     < > = values in this interval not displayed. BMP:   Recent Labs     01/16/20  0535 01/17/20  0535 01/18/20  0550    140 140   K 3.4* 3.4* 3.6    108* 109*   CO2 23 22 19*   PHOS 2.8 2.4* 2.5   BUN 17 12 11   CREATININE 0.8 0.7 0.7     Mag: No results for input(s): MAG in the last 72 hours. Phos:   Lab Results   Component Value Date    PHOS 2.5 01/18/2020     No components found for: GLU    LIVER PROFILE:   Recent Labs     01/16/20  0535 01/17/20  0535 01/18/20  0550   AST 33 25 21   ALT 25 20 17   BILITOT 0.5 0.5 0.3   ALKPHOS 59 54 52     PT/INR:   No results for input(s): PROTIME, INR in the last 72 hours. APTT: No results for input(s): APTT in the last 72 hours. UA:No results for input(s): NITRITE, COLORU, PHUR, LABCAST, WBCUA, RBCUA, MUCUS, TRICHOMONAS, YEAST, BACTERIA, CLARITYU, SPECGRAV, LEUKOCYTESUR, UROBILINOGEN, BILIRUBINUR, BLOODU, GLUCOSEU, AMORPHOUS in the last 72 hours.     Invalid input(s): Zora Harvey input(s): ABG  Lab Results   Component Value Date    CALCIUM 7.4 (L) 01/18/2020    PHOS 2.5 01/18/2020           Electronically signed by London Bosch DO on 1/18/2020 at 4:51 PM

## 2020-01-18 NOTE — PROGRESS NOTES
Dr. Yrn Aguilera and Dr. Nando Dudley made aware of continued lower BP's as well as low urine output over last 4 hours. Ok to still transfer patient per orders when bed available due to patient just receiving 1 liter bolus.

## 2020-01-19 NOTE — PLAN OF CARE
Problem: Falls - Risk of:  Goal: Will remain free from falls  Description  Will remain free from falls  Outcome: Met This Shift     Problem: Skin Integrity:  Goal: Will show no infection signs and symptoms  Description  Will show no infection signs and symptoms  Outcome: Met This Shift     Problem: Airway Clearance - Ineffective:  Goal: Ability to maintain a clear airway will improve  Description  Ability to maintain a clear airway will improve  Outcome: Met This Shift

## 2020-01-19 NOTE — PROGRESS NOTES
Spoke with [facility] and they state the patient was NPO at their facility. Questioned for a list of medications and gave our fax number.  RN states she will attempt to see if she can print a list out due to the patient being out of their system   Rosendo Kelley

## 2020-01-19 NOTE — PROGRESS NOTES
GENERAL SURGERY  DAILY PROGRESS NOTE  1/19/2020    Subjective:  Transferred from SICU. Pain controlled, denies any nausea or vomiting. Tolerating trickle TF. Objective:  /60   Pulse 81   Temp 97.3 °F (36.3 °C) (Temporal)   Resp 16   Ht 6' 3\" (1.905 m)   Wt 218 lb 7.6 oz (99.1 kg)   SpO2 100%   BMI 27.31 kg/m²     GENERAL:  Laying in bed, no apparent distress, confused  LUNGS:  No increased work of breathing, no cyanosis  CARDIOVASCULAR:  Extremities warm and well perfused,   ABDOMEN:  Soft, no tenderness, non distended, ostomy pink and patent with stool in ostomy appliance. Assessment/Plan:  80 y.o. male with blood per rectum 2/2 large anorectal fistula s/p diverting colostomy POD 2    -ok to resume TF to full rate  -transfuse PRN for Hgb <7,  - Replace electrolytes PRN    Electronically signed by Ashely Hutchison MD on 1/19/2020 at 9:40 AM     Attending Note:    Patient seen/examined 1/19/2020. Agree with above assessment and plan. +ostomy function. Ok to start daily rectal wound packing.

## 2020-01-20 NOTE — PROGRESS NOTES
[facility] messaged regarding possible restart of patient's home dose of Eliquis.  Electronically signed by Paula Munoz RN on 1/20/2020 at 6:12 PM

## 2020-01-20 NOTE — PROGRESS NOTES
nontender, nondistended, no masses. + PEG  New Ostomy Present that is Pink   Extrem:  No clubbing, cyanosis,  no edema  Skin: no rashes or lesions  Psych: Sleeping, Left to rest   Neuro: Sleeping, left to rest      Assessment:    Principal Problem:    Acute lower gastrointestinal hemorrhage  Active Problems:    Severe protein-calorie malnutrition (HCC)    A-fib (HCC)    Alzheimer's dementia without behavioral disturbance ([facility] [address]    Palliative care by specialist  Resolved Problems:    * No resolved hospital problems. 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 If source  Planned for OR for Diverting Colostomy and completed on HD-4  Trickle Tube Feeds, Resume Regular Rate on 1/20  Wound Packing to begin  Continue Rate Control for Atrial Fibrillation, agree with hold A/C  Pressure Ulcers Present on Admission -- Multiple, Wound Care    Right Buttock Stage 2   Left Hip Stage 2   Left Heel Unstageable   Coccyx Stage 3  Hx of COPD -- Currently stable   Dispo Planning -- Return to SOV when stable     Prognosis:  Guarded    Code status:  Full Code is documented     DVT prophylaxis: [] Lovenox  [] SQ Heparin  [] SCDs  warfarin/oral direct thrombin inhibitor [] Encourage ambulation  GI prophylaxis: [x] PPI/[id number]  [] not indicated  Diet:  DIET TUBE FEED CONTINUOUS/CYCLIC NPO;  Immune Enhancing; Gastrostomy; Continuous; 15; 55; 24; Exceptions are: Sips with Meds    Disposition:  [] Home  [] Home with home health [] Rehab [] Psych [] SNF  [] LTAC  [] Long term nursing home or group home [] Transfer to ICU  [] Transfer to PCU [] Other:     Medications:  Scheduled Meds:   potassium bicarb-citric acid  20 mEq Per G Tube BID    potassium & sodium phosphates  1 packet Per G Tube 4x Daily    cefTRIAXone (ROCEPHIN) IV  2 g Intravenous Q24H  donepezil  10 mg Oral Nightly    memantine  10 mg Oral BID    metroNIDAZOLE  500 mg Intravenous Q8H    mineral oil-hydrophilic petrolatum   Topical BID    sodium chloride flush  10 mL Intravenous 2 times per day    ipratropium-albuterol  3 mL Inhalation Q4H WA    trospium  20 mg Oral Nightly    pantoprazole  40 mg Intravenous BID    And    sodium chloride (PF)  10 mL Intravenous Daily       PRN Meds:  acetaminophen, mineral oil-hydrophilic petrolatum **AND** mineral oil-hydrophilic petrolatum, sodium chloride flush, magnesium hydroxide, ondansetron    IV:        Intake/Output Summary (Last 24 hours) at 1/19/2020 2229  Last data filed at 1/19/2020 1520  Gross per 24 hour   Intake 2085.92 ml   Output 550 ml   Net 1535.92 ml       Results:  CBC:   Recent Labs     01/17/20  0535  01/18/20  0550 01/18/20  1107 01/18/20  2040 01/19/20  0526   WBC 7.8  --  9.3  --   --  7.6   HGB 7.8*   < > 7.7* 8.6* 9.1* 9.0*   HCT 24.4*   < > 24.2* 26.9* 28.2* 28.0*   MCV 90.0  --  91.3  --   --  90.3     --  330  --   --  353    < > = values in this interval not displayed. BMP:   Recent Labs     01/17/20  0535 01/18/20  0550 01/19/20  0526    140 138   K 3.4* 3.6 3.2*   * 109* 107   CO2 22 19* 20*   PHOS 2.4* 2.5 2.0*   BUN 12 11 10   CREATININE 0.7 0.7 0.7     Mag: No results for input(s): MAG in the last 72 hours. Phos:   Lab Results   Component Value Date    PHOS 2.0 (L) 01/19/2020     No components found for: GLU    LIVER PROFILE:   Recent Labs     01/17/20  0535 01/18/20  0550 01/19/20  0526   AST 25 21 23   ALT 20 17 17   BILITOT 0.5 0.3 0.4   ALKPHOS 54 52 56     PT/INR:   No results for input(s): PROTIME, INR in the last 72 hours. APTT: No results for input(s): APTT in the last 72 hours.   UA:No results for input(s): NITRITE, COLORU, PHUR, LABCAST, WBCUA, RBCUA, MUCUS, TRICHOMONAS, YEAST, BACTERIA, CLARITYU, SPECGRAV, LEUKOCYTESUR, UROBILINOGEN, BILIRUBINUR, BLOODU, GLUCOSEU, AMORPHOUS in the last 72 hours.     Invalid input(s): Somers Lowing input(s): ABG  Lab Results   Component Value Date    CALCIUM 7.7 (L) 01/19/2020    PHOS 2.0 (L) 01/19/2020           Electronically signed by Abbey Walter DO on 1/19/2020 at 10:29 PM

## 2020-01-20 NOTE — PROGRESS NOTES
GENERAL SURGERY  DAILY PROGRESS NOTE  1/20/2020    Subjective: Tolerating full TF, no nausea or vomiting, + ostomy functioning    Objective:  BP (!) 96/51   Pulse 79   Temp 97 °F (36.1 °C) (Temporal)   Resp 18   Ht 6' 3\" (1.905 m)   Wt 218 lb 7.6 oz (99.1 kg)   SpO2 100%   BMI 27.31 kg/m²     GENERAL:  Laying in bed, no apparent distress, confused  LUNGS:  No increased work of breathing, no cyanosis  CARDIOVASCULAR:  Extremities warm and well perfused,   ABDOMEN:  Soft, no tenderness, non distended, ostomy pink and patent with stool in ostomy appliance. Assessment/Plan:  80 y.o. male with blood per rectum 2/2 large anorectal fistula s/p diverting colostomy POD 3    - ok to continue TF to full rate  - transfuse PRN for Hgb <7,  - Replace electrolytes PRN per primary  - wound packing daily and PRN for rectal fistula, will defer remainder of wound care to wound nurse    Electronically signed by Pamela Lucero MD on 1/20/2020 at 6:15 AM     .Attending Note:    Patient seen/examined 1/20/2020. Agree with above assessment and plan. Cont wound care, ostomy functioning.

## 2020-01-20 NOTE — PROGRESS NOTES
ASSESSMENT:    Principal Problem:    Acute lower gastrointestinal hemorrhage  Active Problems:    Severe protein-calorie malnutrition (HCC)    A-fib (HCC)    Alzheimer's dementia without behavioral disturbance ([facility] [address]    Palliative care by specialist  Resolved Problems:    * No resolved hospital problems. *       PLAN:    Acute lower GI bleed -endoscopy showed anal rectal fistula -patient had a diverting colostomy placed on hospital day 4. Tube feeds were restarted currently at regular rate, tolerating well  Wound packing per wound care and general surgery  Hold anticoagulation until cleared per general surgery  COPD is currently stable.   Continue to monitor daily labs, replete electrolytes and RBCs as needed  Pressure Ulcers Present on Admission -- Multiple, Wound Care               Right Buttock Stage 2              Left Hip Stage 2              Left Heel Unstageable              Coccyx Stage 3        DISPOSITION: Return to SOV when stable     Medications:  REVIEWED DAILY    Infusion Medications   Scheduled Medications    potassium & sodium phosphates  2 packet Per G Tube 4x Daily    famotidine  20 mg Oral BID    potassium bicarb-citric acid  20 mEq Per G Tube BID    cefTRIAXone (ROCEPHIN) IV  2 g Intravenous Q24H    donepezil  10 mg Oral Nightly    memantine  10 mg Oral BID    metroNIDAZOLE  500 mg Intravenous Q8H    mineral oil-hydrophilic petrolatum   Topical BID    sodium chloride flush  10 mL Intravenous 2 times per day    ipratropium-albuterol  3 mL Inhalation Q4H WA    trospium  20 mg Oral Nightly     PRN Meds: acetaminophen, mineral oil-hydrophilic petrolatum **AND** mineral oil-hydrophilic petrolatum, sodium chloride flush, magnesium hydroxide, ondansetron    Labs:     Recent Labs     01/18/20  0550 01/18/20  1107 01/18/20  2040 01/19/20  0526   WBC 9.3  --   --  7.6   HGB 7.7* 8.6* 9.1* 9.0*   HCT 24.2* 26.9* 28.2* 28.0*     --   --  353       Recent Labs     01/18/20  0550 01/19/20  0526 01/20/20  0620    138 139   K 3.6 3.2* 4.6   * 107 109*   CO2 19* 20* 17*   BUN 11 10 11   CREATININE 0.7 0.7 0.8   CALCIUM 7.4* 7.7* 7.8*   PHOS 2.5 2.0*  --        Recent Labs     01/18/20  0550 01/19/20  0526 01/20/20  0620   PROT 4.9* 5.1* 5.2*   ALKPHOS 52 56 59   ALT 17 17 17   AST 21 23 34   BILITOT 0.3 0.4 0.3       No results for input(s): INR in the last 72 hours. No results for input(s): Pamalee Cathleeny in the last 72 hours. Chronic labs:    Lab Results   Component Value Date    CHOL 166 09/19/2017    TRIG 36 01/19/2020    HDL 69 09/19/2017    LDLCALC 88 09/19/2017    TSH 1.930 09/02/2018    INR 1.5 01/14/2020    LABA1C 5.2 09/19/2017       Radiology: REVIEWED DAILY    +++++++++++++++++++++++++++++++++++++++++++++++++  [address]  +++++++++++++++++++++++++++++++++++++++++++++++++  NOTE: This report was transcribed using voice recognition software. Every effort was made to ensure accuracy; however, inadvertent computerized transcription errors may be present.

## 2020-01-20 NOTE — PROGRESS NOTES
Nutrition Assessment (Enteral Nutrition)    Type and Reason for Visit: Reassess    Nutrition Recommendations: Tube Feed recommendation if needed. Recommend Immune Enhancing (Pivot 1.5) @60/hr to provide 1440ml, 2160 calories, 135g protein, 1093ml water. Monitor tube feed tolerance and gastric residuals. Nutrition Assessment: Pt tolerating tube feeds @55/hr at this time ; TF order for Immune Enhancing although Standard with Fiber hung and running ; Nurse notified and will change; Pt remains severely malnourished ; Pt at nutritional risk d/t NPO status and need for EN support ; Pt at further nutritional compromise AEB increased needs from wound healing ; Will provide updated tube feeding recommendations     Malnutrition Assessment:  · Malnutrition Status: Meets the criteria for severe malnutrition  · Context: Chronic illness  · Findings of the 6 clinical characteristics of malnutrition (Minimum of 2 out of 6 clinical characteristics is required to make the diagnosis of moderate or severe Protein Calorie Malnutrition based on AND/ASPEN Guidelines):  1. Energy Intake-Less than or equal to 75% of estimated energy requirement, Greater than or equal to 3 months    2. Weight Loss-5% loss or greater, (x 8 mon)  3. Fat Loss-Severe subcutaneous fat loss, Orbital  4. Muscle Loss-Severe muscle mass loss, Temples (temporalis muscle), Clavicles (pectoralis and deltoids)  5. Fluid Accumulation-No significant fluid accumulation,    6.  Strength-Not measured    Nutrition Risk Level: Moderate    Nutrition Needs:  · Estimated Daily Total Kcal: 2636-7864 (REE 1752 x 1.3 SF)  · Estimated Daily Protein (g): 115-135 (1.3-1.5g/kg IBW)  · Estimated Daily Fluid (ml/day): 2886-1876    Nutrition Diagnosis:   · Problem:  Moderate malnutrition, In context of chronic illness  · Etiology: related to Cognitive or neurological impairment     Signs and symptoms:  as evidenced by Severe muscle loss, Severe loss of subcutaneous fat, Healing, I&O, Monitor Hemodynamic Status, Monitor Bowel Function, Mental Status/Confusion, Weight, Pertinent Labs, Chewing/Swallowing      Electronically signed by Bhupendra Chawla RD, LD on 1/20/20 at 12:56 PM    Contact Number: [phone]

## 2020-01-20 NOTE — FLOWSHEET NOTE
ET Nurse(Initial Consult)[id number]  Admit Date: 1/13/2020 10:11 PM    Reason for consult:  New loop colostomy    Significant history:  Diverting loop colostomy for anorectal fistula    Stoma assessment:       01/20/20 1454   Colostomy LLQ Loop   Placement Date/Time: 01/17/20 1230   Pre-existing: No  Location: LLQ  Colostomy Type: Loop   Stomal Appliance 1 piece; Changed;Leaking   Flange Size (inches)   (3 inch)   Stoma  Assessment Protrudes; Moist;Red; Other (Comment)  (stoma radha)   Mucocutaneous Junction Intact   Peristomal Assessment Intact   Treatment Bag change; Other (Comment)  (skin care,barrier strips)   Stool Appearance Loose   Stool Color Brown   Stool Amount Medium   Output (mL) 150 ml       Plan:    Not appropriate for teaching at this time  Will follow,additional 3 inch pouch left at bedside. Sb Martinez 1/20/2020 2:56 PM

## 2020-01-21 NOTE — PROGRESS NOTES
Occupational Therapy  OCCUPATIONAL THERAPY INITIAL EVALUATION      Date:2020  Patient Name: Cliff Lu  MRN: [id number]  : 1932  Room: [address] [place], [place] [address]A    Re-evaluating OT: Ludmila Varma OTR/L #[id number]     OT re-evaluation completed following physician request     AM-PAC Daily Activity Raw Score:     Recommended Adaptive Equipment:  TBD     Diagnosis: GI bleed     Pertinent Medical History: recent admission for dehydration and malnutrition s/p PEG  & discharge to nursing facility. Alzheimer's disease, PE, DVT, Metabolic syndrome, macular degeneration     Precautions:  Falls, PEG, sacral wounds, dementia, macular degeneration, B LE contractures      Home Living: Pt admitted from nursing facility per son present. Son reports pt was not active with therapy & that pt has not ambulated in ~ 3 months. Pt reports facility transfers pt to chair using standing lift. Pt is a poor historian due to cognitive deficits.      Prior Level of Function: Dep with ADLs; non ambulatory.      Pain Level: pt c/o pain B LE with ROM attempt in prep of mobility. Pt cannot rate. L hip pain increasing with mobility.      Cognition: A&O: 2/4  Pt oriented to self and place \"hospital\". Follows simple 1 step commands              Memory: P+              Comprehension P+              Problem solving: P              Judgement/safety: P                 Communication skills: grossly WFL; following and answering questions appropriately               Vision: macular degeneration per chart; impaired tracking and eye contact.                       Glasses:yes                                                        Hearing: grossly WFL     Functional Assessment:   Initial Eval Status  Date: 20 Treatment Status  Date: Short Term Goals  Treatment frequency: PRN   Feeding Peg tube    NPO       Grooming Maximal Assist   Minimal Assist    UB Dressing Maximal Assist   Minimal Assist    LB Dressing Dependent       Bathing Dependent  Maximal Assist    Toileting Dependent     Dependent for hygiene       Bed Mobility  Rolling / repositioning:  Dependent  Supine to sit: NT   Sit to supine: NT      Functional Transfers NT      Functional Mobility NT      Balance Sitting:     Static:  NT    Dynamic:NT  Standing: NT     Activity Tolerance P+  F  Light bed level activity    Visual/  Perceptual Macular degeneration                   Hand dominance: right      Strength ROM Additional Info:    RUE   3/5 Shoulder limited to 50 degrees flexion  Elbow wfl  good  and fair- FMC/dexterity noted during ADL tasks     LUE 3/5 Shoulder limited to 50 degrees flexion  Elbow wfl  good  and fair- FMC/dexterity noted during ADL tasks     Hearing: wfl  Sensation: denies numbness / tingling   Tone: wfl  Edema: B LE edema                             Comments/Treatment: Upon arrival, patient semi-supine in bed and agreeable to OT Session with PT collaboration. Therapist facilitated bed mobility (rolling and repositioning in bed) - skilled repositioning addressing joint and skin integrity. TAPS wedges placed with pt facing left side. - skilled cuing on sequencing, hand placement, posture, body mechanics and safety. Therapist facilitated self-care retraining: UB self-care tasks (gown and bathing task), toileting hygiene task due to incontinence and grooming task (task initiation required) while educating pt on sequencing, modified techniques, posture, safety and energy conservation techniques. Skilled monitoring of HR, O2 sats and pts response to treatment. Pt demonstrating P+ understanding of education/techniques, requiring additional training / education. At end of session, patient semi-supine in bed with call light and phone within reach, all lines and tubes intact - nurse present in room. Pt would benefit from continued skilled OT to increase functional independence and quality of life.     (Evaluation time includes thorough review of current medical information, gathering information on past medical history/social history and prior level of function, completion of standardized testing/informal observation of tasks, assessment of data, and development of POC/Goals.)      Assessment of current deficits   Functional mobility [x]  ADLs [x] Strength [x]  Cognition [x]  Functional transfers  [x] IADLs [x] Safety Awareness [x]  Endurance [x]  Fine Motor Coordination [x] Balance [x] Vision/perception [x] Sensation []   Gross Motor Coordination [] ROM [x] Delirium []                  Motor Control []    Plan of Care:   ADL retraining [x]   Equipment needs [x]   Neuromuscular re-education [x] Energy Conservation Techniques [x]  Functional Transfer training [x] Patient and/or Family Education [x]  Functional Mobility training [x]  Environmental Modifications [x]  Cognitive re-training []   Compensatory techniques for ADLs [x]  Splinting Needs []   Positioning to improve overall function [x]   Therapeutic Activity [x]  Therapeutic Exercise  [x]  Visual/Perceptual: []    Delirium prevention/treatment  []   Other:  []    Rehab Potential: Fair for established goals     Patient / Family Goal:  Not stated     Patient and/or family were instructed diagnosis, prognosis/goals and plan of care. Demonstrated fair understanding. [] Malnutrition indicators have been identified and nursing has been notified to ensure a dietitian consult is ordered.        AM-PAC Daily Activity Inpatient   How much help for putting on and taking off regular lower body clothing?: Total  How much help for Bathing?: Total  How much help for Toileting?: Total  How much help for putting on and taking off regular upper body clothing?: A Lot  How much help for taking care of personal grooming?: A Lot  How much help for eating meals?: Total  AM-PAC Inpatient Daily Activity Raw Score: 8  AM-PAC Inpatient ADL T-Scale Score : 22.86  ADL Inpatient CMS 0-100% Score: 85.69  ADL Inpatient CMS G-Code Modifier : CM       Re- Evaluation + 11 timed treatment minutes  Tx Time in: 1019  Tx Time out: 1115 [facility] OTR/L #[id number]

## 2020-01-21 NOTE — PROGRESS NOTES
Physical Therapy  Re-assessment     Name: Yoselin Willard  : 1932  MRN: [id number]    Date of Service: 2020    Evaluating PT: Jun Howard, PT, DPT  Re-evaluating PT: Shelton Flower, PT, DPT [id number]    Room #:  2731/6579-V    Diagnosis: GI bleed  Precautions: Fall risk, B knee flexion contractures, coccyx/buttock wounds, incontinent of bowel, PEG, colostomy, alexandre, TAPS, alarm  Procedures: Laparoscopic diverting colostomy ()  PMHx: Alzheimer's disease, macular degeneration    Pt is poor historian. Per chart, pt was admitted from [facility]. Pt was transferred to [facility] daily using lift. Pt was not active with PT at SNF. Re-evaluation  Date: 20 Treatment Date: Short Term/ Long Term   Goals   AM-PAC 6 Clicks      Was pt agreeable to Eval/treatment? Yes     Does pt have pain? No current complaints/indications of pain     Bed Mobility  Rolling: Max A  Supine to sit: NT  Sit to supine: NT  Scooting: Dependent x2 toward HOB  NA   Transfers Sit to stand: NA  Stand to sit: NA  Stand pivot: NA  NA   Ambulation   NA  NA   Stair negotiation: NA  NA   ROM B UE: Refer to OT note  B LE: B knee flexion contractures     Strength B UE: Refer to OT note  B LE: NT     Balance Sitting EOB: NT  Dynamic standing: NA  Sitting EOB: NA  Dynamic standing: NA     Pt is A & O x: 2 to person and place. Sensation: NT  Coordination: NT  Edema: B LE swelling noted. ASSESSMENT    Patient education  NA due to cognitive status. Patient response to education:   Pt verbalized understanding Pt demonstrated skill Pt requires further education in this area   NA NA NA`     Comments:   Pt was supine in bed upon room entry, agreeable to PT re-evaluation with OT collaboration. Pt is very confused but able to follow commands and participate at times. Pt was recently incontinent of bowel so pt was assisted with rolls to L and R as perineal care was performed.  Pt demonstrated ability to maintain rolls for several minutes at a time by

## 2020-01-21 NOTE — PROGRESS NOTES
26.9* 28.2* 28.0*   PLT  --   --  353       Recent Labs     01/19/20  0526 01/20/20  0620    139   K 3.2* 4.6    109*   CO2 20* 17*   BUN 10 11   CREATININE 0.7 0.8   CALCIUM 7.7* 7.8*   PHOS 2.0*  --        Recent Labs     01/19/20  0526 01/20/20  0620   PROT 5.1* 5.2*   ALKPHOS 56 59   ALT 17 17   AST 23 34   BILITOT 0.4 0.3       No results for input(s): INR in the last 72 hours. No results for input(s): Hubert Shall in the last 72 hours. Chronic labs:    Lab Results   Component Value Date    CHOL 166 09/19/2017    TRIG 36 01/19/2020    HDL 69 09/19/2017    LDLCALC 88 09/19/2017    TSH 1.930 09/02/2018    INR 1.5 01/14/2020    LABA1C 5.2 09/19/2017       Radiology: REVIEWED DAILY    +++++++++++++++++++++++++++++++++++++++++++++++++  Duane [address] [place] [address]  +++++++++++++++++++++++++++++++++++++++++++++++++  NOTE: This report was transcribed using voice recognition software. Every effort was made to ensure accuracy; however, inadvertent computerized transcription errors may be present.

## 2020-01-21 NOTE — DISCHARGE INSTR - COC
Continuity of Care Form    Patient Name: Leelee Rockwell   :  1932  MRN:  [id number]    Admit date:  2020  Discharge date:      Code Status Order: Full Code   Advance Directives:   [facility] Documentation     Date/Time Healthcare Directive Type of Healthcare Directive Copy in [address] Agent's Name Healthcare Agent's Phone Number    20 [phone]  Unknown, patient unable to respond due to medical condition -- --  Adult Children -- --          Admitting Physician:  Alicja Baldwin DO  PCP: Jesusa Halsted, DO    Discharging Nurse: [facility] Unit/Room#: 2952/9372-V  Discharging Unit Phone Number: [phone]    Emergency Contact:   Extended Emergency Contact Information  Primary Emergency Contact: [place], [address] [place] [address] Phone: [phone]  Relation: Child  Preferred language: English   needed? No  Secondary Emergency Contact: [address] [place] [address]  [place] [address] Phone: [phone]  Relation: Other  Preferred language: English   needed?  No    Past Surgical History:  Past Surgical History:   Procedure Laterality Date    ANUS SURGERY  1/15/2020    RECTAL LESION BIOPSY EXCISION TRANS ANAL performed by Umair Salmon MD at [facility] 22 COLONOSCOPY  07/15/2003    GASTROSTOMY TUBE PLACEMENT N/A 2020    EGD PEG TUBE PLACEMENT performed by Blanca Valenzuela MD at [facility] 157 HISTORY  2019    Dr. Rachel Mustafa- IVC Filter    POLYPECTOMY      X 2    SMALL INTESTINE SURGERY N/A 2020    LAPAROSCOPIC DIVERTING COLOSTOMY performed by Umair Salmon MD at [facility] N/A 1/15/2020    EGD ESOPHAGOGASTRODUODENOSCOPY performed by Umair Salmon MD at [address]       Immunization History:   Immunization History   Administered Date(s) Administered    Influenza Virus Vaccine 2017    Pneumococcal Conjugate 13-valent ([id number]) 2016    Pneumococcal Polysaccharide ([id number]) 01/01/1998, 01/01/2005       Active Problems:  Patient Active Problem List   Diagnosis Code    Macular degeneration H35.30    Severe protein-calorie malnutrition ([facility] [address] E43    DVT (deep venous thrombosis) ([facility]) I82.409    Altered mental status R41.82    DINA (acute kidney injury) ([facility] [address] N17.9    A-fib ([facility]) I48.91    Friction blisters of sole of right foot S90.821A    Alzheimer's dementia without behavioral disturbance ([facility]) G30.9, F02.80    Bilateral lower extremity edema R60.0    OAB (overactive bladder) N32.81    Other pulmonary embolism without acute cor pulmonale ([facility]) I26.99    Hypernatremia E87.0    Dehydration E86.0    Acute lower gastrointestinal hemorrhage K92.2    Palliative care by specialist Z51.5       Isolation/Infection:   Isolation          No Isolation        Patient Infection Status     Infection Onset Added Last Indicated Last Indicated By Review Planned Expiration Resolved Resolved By    None active    Resolved    C-diff Rule Out  12/26/19 12/26/19 C. difficile toxin Molecular (Ordered)   01/21/20 Michaelle Cummings RN          Nurse Assessment:  Last Vital Signs: BP (!) 132/56   Pulse 88   Temp 97.6 °F (36.4 °C) (Temporal)   Resp 16   Ht 6' 3\" (1.905 m)   Wt 217 lb 6.4 oz (98.6 kg)   SpO2 98%   BMI 27.17 kg/m²     Last documented pain score (0-10 scale): Pain Level: 0  Last Weight:   Wt Readings from Last 1 Encounters:   01/21/20 217 lb 6.4 oz (98.6 kg)     Mental Status:  disoriented    IV Access:  - None  - ***    Nursing Mobility/ADLs:  Walking   Dependent  Transfer  Dependent  Bathing  Dependent  Dressing  Dependent  Toileting  Dependent  Feeding  Dependent  Med Admin  Dependent  Med Delivery   none    Wound Care Documentation and Therapy:  Wound 01/13/20 Coccyx (Active)   Wound Image   1/14/2020  3:04 PM   Wound Pressure Unstageable 1/19/2020  8:15 AM   Dressing Status Clean;Dry; Intact 1/20/2020  9:55 PM   Dressing Changed Changed/New 1/19/2020 8:45 PM   Dressing/Treatment Open to air; Pharmaceutical agent (see MAR) 1/19/2020  8:45 PM   Wound Cleansed Rinsed/Irrigated with saline 1/19/2020  8:15 AM   Wound Length (cm) 6 cm 1/14/2020  3:04 PM   Wound Width (cm) 4 cm 1/14/2020  3:04 PM   Wound Depth (cm) 0.1 cm 1/14/2020  3:04 PM   Wound Surface Area (cm^2) 24 cm^2 1/14/2020  3:04 PM   Change in Wound Size % (l*w) -50 1/14/2020  3:04 PM   Wound Volume (cm^3) 2.4 cm^3 1/14/2020  3:04 PM   Wound Healing % -1400 1/14/2020  3:04 PM   Wound Assessment Red;Yellow 1/19/2020  8:45 PM   Drainage Amount None 1/19/2020  8:45 PM   Odor None 1/19/2020  8:45 PM   Madison-wound Assessment Pink; Intact 1/19/2020  8:45 PM   Red%Wound Bed 60 1/19/2020  8:45 PM   Yellow%Wound Bed 40 1/19/2020  8:45 PM   Number of days: 7       Wound 01/13/20 Foot Right;Plantar (Active)   Wound Image   1/14/2020  3:04 PM   Wound Deep tissue/Injury 1/19/2020  8:15 AM   Dressing/Treatment Open to air 1/20/2020  9:55 PM   Wound Length (cm) 2 cm 1/19/2020  8:15 AM   Wound Width (cm) 3 cm 1/19/2020  8:15 AM   Wound Surface Area (cm^2) 6 cm^2 1/19/2020  8:15 AM   Change in Wound Size % (l*w) 75 1/19/2020  8:15 AM   Wound Volume (cm^3) 0.9 cm^3 1/13/2020 10:20 PM   Wound Assessment Black 1/20/2020  3:45 PM   Drainage Amount None 1/19/2020  8:45 PM   Odor None 1/19/2020  8:45 PM   Black%Wound Bed 100 1/19/2020  8:45 PM   Number of days: 7       Wound 01/13/20 Buttocks Right (Active)   Wound Image   1/14/2020  3:04 PM   Wound Pressure Stage  2 1/19/2020  8:15 AM   Dressing Status Clean;Dry; Intact 1/20/2020  3:45 PM   Dressing Changed Dressing reinforced 1/19/2020  8:45 PM   Dressing/Treatment Open to air; Pharmaceutical agent (see MAR) 1/20/2020  9:55 PM   Wound Cleansed Rinsed/Irrigated with saline 12/25/2019  8:45 PM   Wound Length (cm) 2 cm 1/19/2020  8:15 AM   Wound Width (cm) 5 cm 1/19/2020  8:15 AM   Wound Depth (cm) 0.1 cm 1/19/2020  8:15 AM   Wound Surface Area (cm^2) 10 cm^2 1/19/2020  8:15 AM Hip Left;Upper (Active)   Wound Pressure Stage  2 1/19/2020  8:15 AM   Dressing/Treatment Pharmaceutical agent (see MAR) 1/20/2020  9:55 PM   Wound Length (cm) 1 cm 1/14/2020  3:04 PM   Wound Width (cm) 1 cm 1/14/2020  3:04 PM   Wound Depth (cm) 0.1 cm 1/14/2020  3:04 PM   Wound Surface Area (cm^2) 1 cm^2 1/14/2020  3:04 PM   Wound Volume (cm^3) 0.1 cm^3 1/14/2020  3:04 PM   Wound Assessment Red 1/20/2020  3:45 PM   Drainage Amount None 1/20/2020  3:45 PM   Maggi-wound Assessment Intact 1/20/2020  3:45 PM   Red%Wound Bed 100 1/19/2020  8:45 PM   Number of days: 7       Wound 01/14/20 maggi rectal (Active)   Wound Image   1/14/2020  3:04 PM   Dressing Status Changed 1/20/2020  9:55 PM   Dressing Changed Changed/New 1/20/2020  9:55 PM   Dressing/Treatment Moist to dry 1/20/2020  9:55 PM   Wound Length (cm) 3 cm 1/19/2020  8:15 AM   Wound Width (cm) 1 cm 1/19/2020  8:15 AM   Wound Surface Area (cm^2) 3 cm^2 1/19/2020  8:15 AM   Change in Wound Size % (l*w) 0 1/19/2020  8:15 AM   Drainage Amount Moderate 1/20/2020  2:30 PM   Drainage Description Serosanguinous; Other (Comment) 1/20/2020  2:30 PM   Number of days: 7        Elimination:  Continence:   · Bowel: No  · Bladder: No  Urinary Catheter: Removal Date 01/21/2020   Colostomy/Ileostomy/Ileal Conduit: Yes  Colostomy LLQ Loop-Stomal Appliance: 1 piece, Changed, Leaking  Colostomy LLQ Loop-Flange Size (inches): (3 inch)  Colostomy LLQ Loop-Stoma  Assessment: Pink, Moist, Protrudes  Colostomy LLQ Loop-Mucocutaneous Junction: Intact  Colostomy LLQ Loop-Peristomal Assessment: Unable to assess  Colostomy LLQ Loop-Treatment: Bag change  Colostomy LLQ Loop-Stool Appearance: Watery  Colostomy LLQ Loop-Stool Color: Brown  Colostomy LLQ Loop-Stool Amount: Large  Colostomy LLQ Loop-Output (mL): 150 ml    Date of Last BM: 01/21/2020    Intake/Output Summary (Last 24 hours) at 1/21/2020 1508  Last data filed at 1/21/2020 1426  Gross per 24 hour   Intake 1256 ml   Output 400 ml   Net

## 2020-01-21 NOTE — DISCHARGE SUMMARY
effort  Cardiovascular: Regular rate rhythm, normal S1-S2  Abdomen: Soft, nontender, nondistended; ostomy pink and patent   Musculoskeletal: No clubbing, cyanosis, no bilateral lower extremity edema. Brisk capillary refill. Skin: Multiple pressure ulcers. Listed below. Neurologic: awake, alert and following commands       Consults:   IP CONSULT TO GENERAL SURGERY  IP CONSULT TO PALLIATIVE CARE  IP CONSULT TO DIETITIAN    Discharge Diagnoses:  Principal Problem:    Acute lower gastrointestinal hemorrhage  Active Problems:    Severe protein-calorie malnutrition ([facility]    A-fib (HCC)    Alzheimer's dementia without behavioral disturbance ([facility] [facility]    Palliative care by specialist  Resolved Problems:    * No resolved hospital problems. *        Discharge Instructions / Follow up:    Continued appropriate risk factor modification of blood pressure, diabetes and serum lipids will remain essential to reducing risk of future atherosclerotic development    Activity: activity as tolerated    Significant labs:  CBC:   Recent Labs     01/21/20  1046   WBC 7.3   RBC 3.10*   HGB 8.9*   HCT 28.5*   MCV 91.9   RDW 15.9*        BMP:   Recent Labs     01/20/20  0620 01/21/20  1046    141   K 4.6 4.2   * 107   CO2 17* 24   BUN 11 12   CREATININE 0.8 0.7     LFT:  Recent Labs     01/20/20  0620 01/21/20  1046   PROT 5.2* 5.5*   ALKPHOS 59 63   ALT 17 15   AST 34 24   BILITOT 0.3 0.4     PT/INR: No results for input(s): INR, APTT in the last 72 hours. BNP: No results for input(s): BNP in the last 72 hours.   Hgb A1C:   Lab Results   Component Value Date    LABA1C 5.2 09/19/2017     Folate and B12:   Lab Results   Component Value Date    [id number] 5692 (H) 12/29/2019   ,   Lab Results   Component Value Date    FOLATE 13.6 12/29/2019     Thyroid Studies:   Lab Results   Component Value Date    TSH 1.930 09/02/2018       Urinalysis:    Lab Results   Component Value Date    NITRU Negative 12/25/2019    WBCUA 5-10 CONTRAST NUMBER OF IMAGES:  822 INDICATION:  Large lower GI bleed. COMPARISON: None Technique: Low-dose CT  acquisition technique included one of following options; 1 . Automated exposure control, 2. Adjustment of MA and or KV according to patient's size or 3. Use of iterative reconstruction. Contiguous spiral images were obtained in the axial plane, prior to and following the administration of intravenous contrast using CT angiographic protocol. Sagittal and coronal images were reconstructed from the axial plane acquisition. Additional 3-D reconstructions were presented to aid in the interpretation of this study. Intravenous injection of 90 mL Isovue-370 for the study. FINDINGS: There is no identified active GI bleed or any clearly evident underlying cause of bleeding such as a GI neoplasm. The 3 visceral arteries as well as both renal arteries are widely patent. The liver, gallbladder, spleen and pancreas are not clearly pathologic. The adrenal glands are unremarkable. There are 2 left renal cysts. No active process is demonstrated related to the bowel or bladder. There is soft tissue anasarca. No demonstrable active GI bleeding or source of GI bleeding demonstrated. Additional observations as outlined above. Fl Modified Barium Swallow W Video    Result Date: 2019  Patient MRN: [id number] : 1932 Age:  80 years Gender: Male Order Date: 2019 12:00 AM Exam: FL MODIFIED BARIUM SWALLOW W VIDEO Number of Images: 5 Indication:   repeat to see if improved Comparison: 2019. Fluoroscopy time: 0.6 minutes Findings: Textures given, nectar, pudding Aspiration, nectar, pudding Laryngeal penetration, nectar, pudding Cough, None Oral delay, None Retention in vallecula, Yes Retention in piriform sinuses, Yes Pharyngeal delay, None Laryngeal elevation, reduced     Study is remarkable for aspiration with all consistencies offered. There is retention in the vallecula and piriform sinuses.  This on discharge is more than 35 minutes in the examination, evaluation, counseling and review of medications and discharge plan.    +++++++++++++++++++++++++++++++++++++++++++++++++  [place], [place]  +++++++++++++++++++++++++++++++++++++++++++++++++  NOTE: This report was transcribed using voice recognition software. Every effort was made to ensure accuracy; however, inadvertent computerized transcription errors may be present.

## 2020-01-22 NOTE — PROGRESS NOTES
Called and gave report to [employer] from [employer], [id number] [facility] transferred pt with discharge paperwork

## 2020-01-24 PROBLEM — E86.0 DEHYDRATION: Status: RESOLVED | Noted: 2019-01-01 | Resolved: 2020-01-01

## 2023-12-21 NOTE — PROGRESS NOTES
OT BEDSIDE TREATMENT NOTE      Date:2019  Patient Name: Maira Miller  MRN: [id number]  : 1932  Room: [address] [place], [place] [address]     Per OT Eval:    Evaluating OT: KEYUR Castro, OTR/L [id number]     AM-PAC Daily Activity Raw Score:   Recommended Adaptive Equipment:  TBD      Diagnosis: hypernatremia, dehydration   Surgery: n/a   Pertinent Medical History: alzheimers, macular degeneration, PE/DVT (IVC Filter)   Precautions:  Falls, contact (for C-diff), wounds, , NPO, cognition     Home Living: Son present to provide prior history. Pt lives with wife in a 1 story home with ramp step(s) to enter; bed/bath on main level   Bathroom setup: n/a, pt sponge bathes   Equipment owned: HB, w/c, ww, cane, BSC  Prior Level of Function: assist with ADLs/IADLs, an aide comes in 2x/day, 6 days/wk; using w/c and  Standing lift machine for functional transfers   Driving: no     Pain Level: 0/10 denied pain initially but reported pain with movements. Nsg reported she provided pain medication ~ 30 min before treatment     Cognition: A&O: 1/4 (self); pleasantly confused, inconsistent following commands, constant tactile & verbal cues to complete task. Pt was verbal, mumbling at times, confused & repeating words he would hear to making occasional clear & lucide response.  When asked where pt worked he stated at the [address] [place] [address]:  poor              Sequencing:  poor              Problem solving:  poor              Judgement/safety:  poor                Functional Assessment:    Initial Eval Status  Date: 19 Treatment Status  Date:  19 Short Term Goals  Treatment frequency: PRN    Feeding Dep (assist to self feed due to pt inability to reach mouth, RN notified)   NPO Mod A   Grooming Dep (assit to wash face, but pt unable to reach)   Dep  Attempting hand over hand assist with Poor results  Mod A   UB Dressing Dep    Max A  [place] & donning gown seated at [address] (assist with B socks) Please send me a [employer] message if you have any questions or concerns.  FOR NON URGENT questions only.  Allow up to 72 hours for response.    If you have prescription issues or other questions you can email   Ingris Spann,  Digital Health Coordinator, at   [email or web address]     Sinusitis-- initial infection presented like influenza-rest of family better but her congestion persists  Antibiotic as written--augmentin as written    Rest and drink plenty of fluids    Tylenol and or motrin as needed for pain and fever (unless you have been told not to take these because of your personal medical history)    Discussed options and precautions:   Viral versus bacterial infection; use of medications; possible side effects; appropriate over-the-counter medications; possible complications and /or when to follow-up.    Follow-up in 1 to 2 days if not improving.  Follow-up immediately if symptoms worsen.    All red flags requiring in person care were discussed.  All patient's questions were answered.    Limitations to telemedicine include inability to do a complete and accurate physical exam.  Any concerns regarding this were conveyed with the patient and in person follow-up recommended if patient nature of illness does not progress as anticipated during this visit.    Over-the-counter cold and cough medications    Take Mucinex for cough, drink plenty of fluids with this medication and will help break up congestion making it easier to cough up    For cough can use honey (children ages 1 and up) in hot tea or hot water. I recommend putting this in an insulated cup and carrying it around throughout the day to sip on.  Have it at your bedside at night in case you wake up coughing.  You can also use honey cough drops (adults and older children).    Recommend nasal saline for use in children and adults.  Neti Torrez can also be helpful.  (Never used tap water and a Neti pot.  Use distilled water.)    If you have  Dep  Jesus & doff socks   Mod A    Bathing Dep (simulated)  Dep  Seated at EOB washing UB & LB with max cues for pt to attempt task with no results  Mod A    Toileting dep Dep  Valdovinos present  Mod A   Bed Mobility  Log roll: Max x 2  Supine to sit: Max x 2   Sit to supine: Max x 2   Dep x 2  Supine < > sit  Log roll Max A  Supine to sit: Max A   Sit to supine: Max A   Functional Transfers Sit to stand:NT   Stand to sit:NT  Commode: NT  Dep  Partial stand    Mod A   Functional Mobility NT  NT Mod A   Balance Sitting:     Static:  Max A (progressed to Min A, vc's for hand placement and posture due to R sided and retro lean)    Dynamic:Max A  Standing: NT  Sitting: Min progressing to SBA  Tolerating 25-30 minutes at EOB  Standing: Dep  Partial stand      Activity Tolerance poor (hallunination, difficulty following commands, decreased alertness, pt able to sit EOB ~15 mins)  Fair  No SOB or fatigue noted      Visual/  Perceptual Glasses: no           Education:  Pt was educated through out treatment regarding proper technique & safety with bed mobility, functional transfers & ADL re-training & compensatory strategies to ease tasks to improve safety & prevent falls and allow pt to return home safely. Unable to complete AAROM exercises with B UE, pt is resistive to movements. Comments: Upon arrival pt was laying in bed on R side & agreeable for therapy. At end of session pt was returned to bed onto R side, pillow between [address] slightly upright, all lines and tubes intact & call light within reach. Bed alarm set     · Pt has made Good progress towards set goals. · Continue with current plan of care    Time in: 10:40am  Time out: 11:20am  Total Tx Time: 40 minutes    Nia Yeager.  [address], [address]    Cleave Fusi, [facility], OTR/L [id number] plugged up congested ears or the feeling of fluid in your ears, you can use an over-the-counter nasal steroid spray like fluticasone (brand name Flonase) use 2 sprays into each nostril once or twice a day for 7 days.  This will help open up the eustachian tubes and allow the fluid to drain out of your ears.    Sleeping with your head/chest elevated can help with sinus drainage.    Adults only-can use nasal decongestant (like Afrin) at bedtime to open nasal passages so you can breathe through your nose while you sleep; avoid using for longer than 3 days as this medication can become addicting.  Do not use if you have high blood pressure or high heart rate.    For severe pain or fever in adult-Tylenol (2 extra strength) or ibuprofen (3-4 tabs equals 600 to 800 mg) alternating as needed for pain.  Tylenol doses should be 6 to 8 hours apart and ibuprofen doses should be 6 to 8 hours apart.      Common cold medications for adults explained:    **Cold medications for children are not recommended-only Tylenol, Motrin, honey (only for children older than 1 year), cool-mist humidifier, and nasal saline spray are recommended for children.    Mucinex-(generic name guaifenesin)-is an expectorant.  This will thin out all the thick mucus.  Must drink plenty of liquids for this medicine to work.    Dextromethorphan (brand name Delsym or DM)-this medicine is a cough suppressant    Honey in hot water or tea-this is a natural cough suppressant    Decongestant nasal spray- (eg: Afrin) use for temporary relief of nasal congestion-best when used at bedtime to open up nasal passages so that you are not forced to mouth breathe overnight.    Sudafed (generic name pseudoephedrine-this must be bought from the pharmacist) DO NOT use this medicine if you have high blood pressure as it can raise your blood pressure higher.  Do not use if you have any irregular heart rate.  This medicine can help clear congestion in your sinuses.

## 2024-08-22 NOTE — H&P
Detail Level: Detailed 29.6 2018    MCHC 32.4 2018    RDW 14.9 2018    LYMPHOPCT 17.5 2018    MONOPCT 10.2 2018    BASOPCT 0.6 2018    MONOSABS 0.53 2018    LYMPHSABS 0.91 2018    EOSABS 0.08 2018    BASOSABS 0.03 2018     CMP:    Lab Results   Component Value Date     2018    K 3.7 2018     2018    CO2 28 2018    BUN 27 2018    CREATININE 1.5 2018    GFRAA 54 2018    LABGLOM 54 2018    GLUCOSE 101 2018    GLUCOSE 84 2012    PROT 6.7 2018    LABALBU 3.6 2018    LABALBU 4.1 2012    CALCIUM 9.1 2018    BILITOT 0.6 2018    ALKPHOS 64 2018    AST 40 2018    ALT 20 2018     Hepatic Function Panel:    Lab Results   Component Value Date    ALKPHOS 64 2018    ALT 20 2018    AST 40 2018    PROT 6.7 2018    BILITOT 0.6 2018    LABALBU 3.6 2018    LABALBU 4.1 2012     PT/INR:    Lab Results   Component Value Date    PROTIME 14.3 2018    INR 1.2 2018     Troponin:    Lab Results   Component Value Date    TROPONINI 0.01 2018     U/A:    Lab Results   Component Value Date    COLORU Yellow 2018    PROTEINU Negative 2018    PHUR 5.0 2018    LABCAST RARE 2018    WBCUA 2-5 2018    RBCUA 10-20 2018    RBCUA NONE 2012    BACTERIA RARE 2018    CLARITYU Clear 2018    SPECGRAV 1.025 2018    LEUKOCYTESUR TRACE 2018    UROBILINOGEN 0.2 2018    BILIRUBINUR Negative 2018    BLOODU MODERATE 2018    GLUCOSEU Negative 2018     Narrative   Patient MRN:  [id number]   : 1932   Age: 80 years   Gender: Male       Order Date:  2018 9:30 PM       EXAM: XR CHEST PORTABLE       COMPARISON: None       INDICATION: Short of breath       FINDINGS:       The heart is normal in size. The mediastinum is normal in width.  There   is a normal appearance Add 22334 Cpt? (Important Note: In 2017 The Use Of 28395 Is Being Tracked By Cms To Determine Future Global Period Reimbursement For Global Periods): yes to the pulmonary vasculature. No focal airspace   opacity. There is no pleural effusion. There is no pneumothorax.             Impression       No airspace opacities or pleural effusion.           6/14/2018  9:39 PM - Juan R, Mhy Incoming Ekg Results From [facility]     Component Results     Component Value Ref Range & Units Status Collected Lab   Ventricular Rate 63  BPM Incomplete 06/14/2018  9:36 PM [facility]   Atrial Rate 63  BPM Incomplete 06/14/2018  9:36 PM [facility]   P-R Interval 80  ms Incomplete 06/14/2018  9:36 PM [facility]   QRS Duration 106  ms Incomplete 06/14/2018  9:36 PM [facility]   Q-T Interval 422  ms Incomplete 06/14/2018  9:36 PM [facility]   QTc Calculation (Bazett) 431  ms Incomplete 06/14/2018  9:36 PM [facility]   P Axis 49  degrees Incomplete 06/14/2018  9:36 PM [facility]   R Axis -58  degrees Incomplete 06/14/2018  9:36 PM [facility]   T Axis 39  degrees Incomplete 06/14/2018  9:36 PM [facility]   Testing Performed By     Lab - Abbreviation Name Director Address Valid Date Range   360-[facility] MUSE Unknown Unknown 04/18/16 1121-Present   Narrative     Sinus rhythm with short ND with premature atrial complexes  Left anterior fascicular block  Septal infarct , age undetermined  Abnormal ECG  No previous ECGs available           Problem list:    Patient Active Problem List   Diagnosis    Metabolic syndrome    Macular degeneration    Pressure ulcer of right buttock, stage 2    History of urinary urgency    DINA (acute kidney injury) ([facility] [address]    General weakness         ASSESSMENT:      1. Acute kidney injury possibly related to diuretic therapy. 2. Alzheimer's dementia    3. Macular degeneration    4. Severe deconditioning due to medical illness    PLAN:     1. Admit for IV hydration    2. Repeat basic metabolic panel tomorrow    3. Obtain records from [facility]    4.  Discussed with PCP and with wife by phone, condition serious, prognosis guarded    Glenis Carballo D.O., [facility]  11:54 AM  6/15/2018

## (undated) DEVICE — GLOVE SURG SZ 75 L12IN FNGR THK94MIL TRNSLUC YEL LTX

## (undated) DEVICE — CANNULA NSL ORAL AD FOR CAPNOFLEX CO2 O2 AIRLFE

## (undated) DEVICE — SET INSTRUMENT LAP I

## (undated) DEVICE — PATIENT RETURN ELECTRODE, SINGLE-USE, CONTACT QUALITY MONITORING, ADULT, WITH 9FT CORD, FOR PATIENTS WEIGING OVER 33LBS. (15KG): Brand: MEGADYNE

## (undated) DEVICE — TOWEL,OR,DSP,ST,GREEN,DLX,XR,4/PK,20PK/C: Brand: MEDLINE

## (undated) DEVICE — INTENDED FOR TISSUE SEPARATION, AND OTHER PROCEDURES THAT REQUIRE A SHARP SURGICAL BLADE TO PUNCTURE OR CUT.: Brand: BARD-PARKER ® STAINLESS STEEL BLADES

## (undated) DEVICE — DEFENDO AIR WATER SUCTION AND BIOPSY VALVE KIT FOR  OLYMPUS: Brand: DEFENDO AIR/WATER/SUCTION AND BIOPSY VALVE

## (undated) DEVICE — BAG SPEC REM 224ML W4XL6IN DIA10MM 1 HND GYN DISP ENDOPCH

## (undated) DEVICE — SYRINGE 20ML LL S/C 50

## (undated) DEVICE — PACK,AURORA,LAVH: Brand: MEDLINE

## (undated) DEVICE — TROCAR: Brand: KII FIOS FIRST ENTRY

## (undated) DEVICE — CAMERA STRYKER 1488 HD GEN

## (undated) DEVICE — TROCAR: Brand: KII® SLEEVE

## (undated) DEVICE — BINDER ABD M/L H12IN FOR 46-62IN WHT 4 SLD PNL DSGN HOOP

## (undated) DEVICE — BLOCK BITE 60FR RUBBER ADLT DENTAL

## (undated) DEVICE — STANDARD HYPODERMIC NEEDLE,ALUMINUM HUB: Brand: MONOJECT

## (undated) DEVICE — SET ENDO INSTR LAPAROSCOPIC STGENLAP

## (undated) DEVICE — Device

## (undated) DEVICE — SYRINGE MED 10ML TRNSLUC BRL PLUNG BLK MRK POLYPR CTRL

## (undated) DEVICE — KIT,ANTI FOG,W/SPONGE & FLUID,SOFT PACK: Brand: MEDLINE

## (undated) DEVICE — INSUFFLATION NEEDLE TO ESTABLISH PNEUMOPERITONEUM.: Brand: INSUFFLATION NEEDLE

## (undated) DEVICE — SURGICAL PROCEDURE PACK BASIC

## (undated) DEVICE — SKIN AFFIX SURG ADHESIVE 72/CS 0.55ML: Brand: MEDLINE

## (undated) DEVICE — INSUFFLATION TUBING SET WITH FILTER, FUNNEL CONNECTOR AND LUER LOCK: Brand: JOSNOE MEDICAL INC

## (undated) DEVICE — FORCEPS LAP DIA5MM BCOCK FEN TIP ROT NONARTICULATING LOK

## (undated) DEVICE — TOWEL,OR,DSP,ST,BLUE,STD,6/PK,12PK/CS: Brand: MEDLINE

## (undated) DEVICE — Z DISCONTINUED NO SUB IDED GLOVE SURG BEAD CUF 8 STD PF WHT STRL TRIUMPH LT LTX

## (undated) DEVICE — GENERATOR ELECSURG FORCETRAID

## (undated) DEVICE — DRAPE,REIN 53X77,STERILE: Brand: MEDLINE

## (undated) DEVICE — GOWN,SIRUS,FABRNF,XL,20/CS: Brand: MEDLINE

## (undated) DEVICE — GAUZE,SPONGE,POST-OP,4X3,STRL,LF: Brand: MEDLINE

## (undated) DEVICE — APPLICATOR PREP 26ML 0.7% IOD POVACRYLEX 74% ISO ALC ST

## (undated) DEVICE — 3M™ IOBAN™ 2 ANTIMICROBIAL INCISE DRAPE 6640EZ: Brand: IOBAN™ 2

## (undated) DEVICE — SYRINGE IRRIG 60ML SFT PLIABLE BLB EZ TO GRP 1 HND USE W/